# Patient Record
Sex: FEMALE | Race: WHITE | NOT HISPANIC OR LATINO | Employment: FULL TIME
[De-identification: names, ages, dates, MRNs, and addresses within clinical notes are randomized per-mention and may not be internally consistent; named-entity substitution may affect disease eponyms.]

---

## 2019-11-08 ENCOUNTER — HEALTH MAINTENANCE LETTER (OUTPATIENT)
Age: 34
End: 2019-11-08

## 2020-02-23 ENCOUNTER — HEALTH MAINTENANCE LETTER (OUTPATIENT)
Age: 35
End: 2020-02-23

## 2020-12-06 ENCOUNTER — HEALTH MAINTENANCE LETTER (OUTPATIENT)
Age: 35
End: 2020-12-06

## 2021-05-30 ENCOUNTER — RECORDS - HEALTHEAST (OUTPATIENT)
Dept: ADMINISTRATIVE | Facility: CLINIC | Age: 36
End: 2021-05-30

## 2021-09-25 ENCOUNTER — HEALTH MAINTENANCE LETTER (OUTPATIENT)
Age: 36
End: 2021-09-25

## 2021-10-06 ENCOUNTER — LAB REQUISITION (OUTPATIENT)
Dept: LAB | Facility: CLINIC | Age: 36
End: 2021-10-06
Payer: COMMERCIAL

## 2021-10-06 PROCEDURE — 88304 TISSUE EXAM BY PATHOLOGIST: CPT | Mod: TC,ORL | Performed by: ORTHOPAEDIC SURGERY

## 2021-10-06 PROCEDURE — 88304 TISSUE EXAM BY PATHOLOGIST: CPT | Performed by: PATHOLOGY

## 2021-10-07 LAB
PATH REPORT.COMMENTS IMP SPEC: NORMAL
PATH REPORT.COMMENTS IMP SPEC: NORMAL
PATH REPORT.FINAL DX SPEC: NORMAL
PATH REPORT.GROSS SPEC: NORMAL
PATH REPORT.MICROSCOPIC SPEC OTHER STN: NORMAL
PATH REPORT.RELEVANT HX SPEC: NORMAL
PHOTO IMAGE: NORMAL

## 2021-10-07 PROCEDURE — 88304 TISSUE EXAM BY PATHOLOGIST: CPT | Mod: 26 | Performed by: PATHOLOGY

## 2021-12-09 ENCOUNTER — HOSPITAL ENCOUNTER (OUTPATIENT)
Dept: MRI IMAGING | Facility: CLINIC | Age: 36
Discharge: HOME | End: 2021-12-09
Attending: ORTHOPAEDIC SURGERY | Admitting: ORTHOPAEDIC SURGERY
Payer: COMMERCIAL

## 2021-12-09 DIAGNOSIS — M25.561 RIGHT KNEE PAIN: ICD-10-CM

## 2021-12-09 PROCEDURE — 73721 MRI JNT OF LWR EXTRE W/O DYE: CPT | Mod: 26 | Performed by: RADIOLOGY

## 2021-12-09 PROCEDURE — 73721 MRI JNT OF LWR EXTRE W/O DYE: CPT | Mod: RT

## 2021-12-10 LAB — RADIOLOGIST FLAGS: NORMAL

## 2021-12-28 ENCOUNTER — HOSPITAL ENCOUNTER (EMERGENCY)
Facility: CLINIC | Age: 36
Discharge: HOME | End: 2021-12-28
Attending: EMERGENCY MEDICINE | Admitting: EMERGENCY MEDICINE
Payer: OTHER MISCELLANEOUS

## 2021-12-28 VITALS
DIASTOLIC BLOOD PRESSURE: 104 MMHG | HEART RATE: 79 BPM | WEIGHT: 160 LBS | OXYGEN SATURATION: 99 % | TEMPERATURE: 98 F | SYSTOLIC BLOOD PRESSURE: 147 MMHG | RESPIRATION RATE: 14 BRPM | BODY MASS INDEX: 27.25 KG/M2

## 2021-12-28 DIAGNOSIS — S06.9X0A TRAUMATIC BRAIN INJURY, WITHOUT LOSS OF CONSCIOUSNESS, INITIAL ENCOUNTER (H): ICD-10-CM
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
## 2021-12-28 PROCEDURE — 250N000013 HC RX MED GY IP 250 OP 250 PS 637: Performed by: EMERGENCY MEDICINE

## 2021-12-28 PROCEDURE — 99282 EMERGENCY DEPT VISIT SF MDM: CPT | Performed by: EMERGENCY MEDICINE

## 2021-12-28 PROCEDURE — 99283 EMERGENCY DEPT VISIT LOW MDM: CPT

## 2021-12-28 PROCEDURE — 250N000011 HC RX IP 250 OP 636: Performed by: EMERGENCY MEDICINE

## 2021-12-28 RX ORDER — ACETAMINOPHEN 500 MG
1000 TABLET ORAL ONCE
Status: COMPLETED | OUTPATIENT
Start: 2021-12-28 | End: 2021-12-28

## 2021-12-28 RX ORDER — ONDANSETRON 4 MG/1
4 TABLET, ORALLY DISINTEGRATING ORAL ONCE
Status: COMPLETED | OUTPATIENT
Start: 2021-12-28 | End: 2021-12-28

## 2021-12-28 RX ADMIN — ACETAMINOPHEN 1000 MG: 500 TABLET, FILM COATED ORAL at 18:41

## 2021-12-28 RX ADMIN — ONDANSETRON 4 MG: 4 TABLET, ORALLY DISINTEGRATING ORAL at 18:41

## 2021-12-28 NOTE — LETTER
December 28, 2021      To Whom It May Concern:      Clau Wilder was seen in our Emergency Department today, 12/28/21.  Please excuse Clau from missing work due to her evaluation in the emergency department and more importantly if her symptoms persist. With history reported further follow-up care may be required if her symptoms persist. If her symptoms worsen she may need to return to the emergency department to be reevaluated. This work note is valid until January 4, 2022.      Sincerely,          Jeffrey Valdovinos MD

## 2021-12-29 ENCOUNTER — TELEPHONE (OUTPATIENT)
Dept: ORTHOPEDICS | Facility: CLINIC | Age: 36
End: 2021-12-29
Payer: COMMERCIAL

## 2021-12-29 NOTE — TELEPHONE ENCOUNTER
RN looked at patient's chart. It appears Ferny has reached out to patient and left her VM so she can call to schedule to see Dr. Sanders on Jan 10th. RN called and also left her VM with the same message.  If patient calls back, please assist patient with an appt for right knee PNVS referred by Dr. Lovelace.    Curtis Mosquera RN          [facility] Call Center    Phone Message    May a detailed message be left on voicemail: yes     Reason for Call: Other: Dr Lovelace is referring Ms. Clau Wilder to Dr Sanders for Right diffused PVNS, MRI confirmed/ patient will be calling to set appt. Patient has had months of pain.      Action Taken: Message routed to:  [facility] ([facility] Ortho    Travel Screening: Not Applicable

## 2021-12-29 NOTE — TELEPHONE ENCOUNTER
Called patient and LVM offering appointment to see Dr. Sanders on January 10th. Patient instructed to call back to be scheduled and has our scheduling number.    Ferny Luna, EMT on 12/29/2021 at 3:46 PM

## 2021-12-29 NOTE — ED PROVIDER NOTES
History     Chief Complaint   Patient presents with     Head Injury     slipped on ice to back and hit back of head, no LOC     HPI  Clau Wilder is a 36 year old female who presents for evaluation after head injury.  Patient on intake reported that she slipped on ice fell backwards and struck her head. Record show a history of excessive bleeding and endometrial  polyp.  Records show she is prescribed Wellbutrin and Lamictal.  On examination patient patient arrived by car reporting that she is [place] she works with [employer]. Earlier this afternoon at 2:45 PM she was returning after visiting a client when she slipped walking into work and fell hitting her back and striking her head. She was wearing eye hats. She reports prior history of head trauma and prior diagnosis of traumatic brain injury in 2 prior occasions. She reports last year she fell while snowboarding without a helmet and also 20 years prior she fell while snowboarding without a helmet. She reports this is a work-related injury. After discussion with human resources at work and her supervisor she was advised to present to be evaluated. Since her fall she has some nausea and headache but reports no neck pain reports neck soreness. Patient reports has had no vomiting no epistaxis. She takes Prozac and Lamictal for mood. The fall was not witnessed.    Allergies:  Allergies   Allergen Reactions     Erythromycin Nausea and Vomiting       Problem List:    Patient Active Problem List    Diagnosis Date Noted     Endometrium, polyp 2016     Priority: Medium     Excessive bleeding in premenopausal period 2016     Priority: Medium        Past Medical History:    Past Medical History:   Diagnosis Date     Calculi, ureter      IUD migration (H)        Past Surgical History:    Past Surgical History:   Procedure Laterality Date      SECTION       [id number] with stone removal        HYSTEROSCOPY DIAGNOSTIC  2016     LAPAROSCOPY OPERATIVE ADULT  2016    removal of ectopic IUD       Family History:    Family History   Problem Relation Age of Onset     Thyroid Cancer Mother      Lupus Maternal Grandmother      Aneurysm Paternal Grandfather        Social History:  Marital Status:  Single [1]  Social History     Tobacco Use     Smoking status: Never Smoker     Smokeless tobacco: Not on file   Substance Use Topics     Alcohol use: Yes     Comment: occ.     Drug use: No        Medications:    buPROPion (WELLBUTRIN XL) 300 MG 24 hr tablet  lamoTRIgine (LAMICTAL) 200 MG tablet          Review of Systems    Physical Exam   BP: (!) 145/92  Pulse: 75  Temp: 98  F (36.7  C)  Resp: 16  Weight: 72.6 kg (160 lb)  SpO2: 99 %      Physical Exam  Constitutional:       General: She is not in acute distress.     Appearance: Normal appearance. She is not ill-appearing, toxic-appearing or diaphoretic.   HENT:      Head: Normocephalic and atraumatic.      Right Ear: Tympanic membrane normal.      Left Ear: Tympanic membrane normal.      Nose: Nose normal.   Eyes:      General: No visual field deficit or scleral icterus.        Right eye: No discharge.         Left eye: No discharge.      Extraocular Movements: Extraocular movements intact.      Pupils: Pupils are equal, round, and reactive to light.   Cardiovascular:      Rate and Rhythm: Normal rate and regular rhythm.      Pulses: Normal pulses.      Heart sounds: Normal heart sounds.   Pulmonary:      Effort: Pulmonary effort is normal. No respiratory distress.      Breath sounds: Normal breath sounds. No stridor. No wheezing, rhonchi or rales.   Chest:      Chest wall: No tenderness.   Musculoskeletal:         General: No swelling, tenderness, deformity or signs of injury.      Cervical back: Normal range of motion and neck supple. No tenderness.      Right lower leg: No edema.      Left lower leg: No edema.   Skin:     Capillary Refill: Capillary refill takes  less than 2 seconds.      Coloration: Skin is not jaundiced or pale.      Findings: No bruising, erythema, lesion or rash.   Neurological:      General: No focal deficit present.      Mental Status: She is alert and oriented to person, place, and time.      GCS: GCS eye subscore is 4. GCS verbal subscore is 5. GCS motor subscore is 6.      Cranial Nerves: No cranial nerve deficit or dysarthria.      Sensory: Sensation is intact. No sensory deficit.      Motor: No weakness.      Coordination: Romberg sign negative. Coordination normal. Finger-Nose-Finger Test normal. Rapid alternating movements normal.      Gait: Gait and tandem walk normal.      Deep Tendon Reflexes: Reflexes normal.   Psychiatric:         Mood and Affect: Mood normal.         Behavior: Behavior normal.         Thought Content: Thought content normal.         Judgment: Judgment normal.         ED Course                 Procedures              Critical Care time:  none               ED medications:  Medications   acetaminophen (TYLENOL) tablet 1,000 mg (1,000 mg Oral Given 12/28/21 1841)   ondansetron (ZOFRAN-ODT) ODT tab 4 mg (4 mg Oral Given 12/28/21 1841)       ED Vitals:  Vitals:    12/28/21 1650 12/28/21 1939   BP: (!) 145/92 (!) 147/104   Pulse: 75 79   Resp: 16 14   Temp: 98  F (36.7  C)    TempSrc: Temporal    SpO2: 99% 99%   Weight: 72.6 kg (160 lb)        ED labs and imaging: none        Assessments & Plan (with Medical Decision Making)   Assessment Summary and Clinical Impression: 36-year-old female presented for evaluation after head trauma.  Patient reported on arrival that she slipped on the ice and fell backwards hitting her head.she reported that she initially fell onto her shoulders and then subsequently struck her head Patient reported a mild global headache headache and nausea.  She was evaluated 4 hours after the fall. The fall was not witnessed while walking into her workplace. Patient reported 2 previous  diagnosis of mild  traumatic brain injury while snowboarding un-helmeted. Last incident was a year earlier.  She presented for evaluation history as reported is likely due to mild traumatic brain injury.  Based on history as reported and exam serially we discussed the indication for neuroimaging and after reviewing risk and benefit patient agreed with foregoing head imaging.  She was given a work note and reported this was work-related and Workmen's Comp. paperwork completed.  A referral was placed to concussion clinic for further follow-up if symptoms persist using the concussion  referral service.  Reviewed worrisome symptoms and reasons to return to the department to evaluated.  There was no scalp hematoma GCS 15.  Normal range of motion of the neck and no other injuries reported.    ED course and Plan:  Reviewed the medical record. We had a discussion about indications for head imaging and reviewed concerning symptoms including symptoms that would be suggestive of clinically significant intracranial pathology such as hemorrhage or skull fracture or cerebral edema. After reviewing risk and benefit of head imaging patient agreed to forego head imaging with plan to manage her symptoms supportively given prior diagnosis of mild traumatic brain injury. She did confirm that there was no loss of consciousness she has had no vomiting and she did not take any medications prior to arrival in the emergency department. She was given Tylenol and Zofran per nursing protocol. She was given a work note and reported this is a work-related injury and Workmen's Comp. paperwork was completed. She was offered therapies for home and declined Zofran. We reviewed worrisome symptoms including vomiting, mental status changes progressive headache that does not resolve with over-the-counter therapies like Tylenol and Motrin ibuprofen.        Disclaimer: This note consists of symbols derived from keyboarding, dictation and/or voice recognition  software. As a result, there may be errors in the script that have gone undetected. Please consider this when interpreting information found in this chart.  I have reviewed the nursing notes.    I have reviewed the findings, diagnosis, plan and need for follow up with the patient.       Discharge Medication List as of 12/28/2021  7:30 PM          Final diagnoses:   Fall, initial encounter - Slip on ice at 2:45 PM walking into work by report   Traumatic brain injury, without loss of consciousness, initial encounter (H) - After fall from standing height. 2 prior episodes of traumatic brain injury reported       12/28/2021   [facility] EMERGENCY DEPT     Farhan Valdovinos MD  12/28/21 0814

## 2021-12-29 NOTE — DISCHARGE INSTRUCTIONS
1) Your evaluation suggest that you likely have mild traumatic brain injury. You have reported prior history of mild traumatic brain injury on 2 previous occasions while snowboarding un-helmeted. After much discussion you have elected to forego head imaging with plan to manage symptoms with therapies reviewed given previous familiarity with head trauma.    2) You have reported this is a work-related injury and a work excuse has been provided and paperwork for Workmen's Comp.    3) although you appear stable for discharge to home we have discussed concerning symptoms including but not limited to persistent headache, vomiting, or any new concerns you should return to be reevaluated.    4) To help facilitate follow up care if needed a referral was placed to the concussion  service for further follow-up care if needed especially if your symptoms persist. He should be called for follow-up visit within the next 2 weeks. If you do not require follow-up he can also call to cancel the support.

## 2022-01-10 ENCOUNTER — OFFICE VISIT (OUTPATIENT)
Dept: FAMILY MEDICINE | Facility: CLINIC | Age: 37
End: 2022-01-10
Payer: OTHER MISCELLANEOUS

## 2022-01-10 ENCOUNTER — ANCILLARY PROCEDURE (OUTPATIENT)
Dept: GENERAL RADIOLOGY | Facility: CLINIC | Age: 37
End: 2022-01-10
Attending: ORTHOPAEDIC SURGERY
Payer: COMMERCIAL

## 2022-01-10 ENCOUNTER — OFFICE VISIT (OUTPATIENT)
Dept: ORTHOPEDICS | Facility: CLINIC | Age: 37
End: 2022-01-10
Payer: COMMERCIAL

## 2022-01-10 VITALS — HEIGHT: 65 IN | WEIGHT: 168 LBS | BODY MASS INDEX: 27.99 KG/M2

## 2022-01-10 VITALS
WEIGHT: 161 LBS | OXYGEN SATURATION: 98 % | BODY MASS INDEX: 27.49 KG/M2 | SYSTOLIC BLOOD PRESSURE: 118 MMHG | TEMPERATURE: 97.3 F | RESPIRATION RATE: 16 BRPM | HEIGHT: 64 IN | HEART RATE: 88 BPM | DIASTOLIC BLOOD PRESSURE: 74 MMHG

## 2022-01-10 DIAGNOSIS — M25.561 RIGHT KNEE PAIN: ICD-10-CM

## 2022-01-10 DIAGNOSIS — S06.0X0D CONCUSSION WITHOUT LOSS OF CONSCIOUSNESS, SUBSEQUENT ENCOUNTER: Primary | ICD-10-CM

## 2022-01-10 DIAGNOSIS — M25.561 RIGHT KNEE PAIN: Primary | ICD-10-CM

## 2022-01-10 DIAGNOSIS — M25.561 CHRONIC PAIN OF RIGHT KNEE: Primary | ICD-10-CM

## 2022-01-10 DIAGNOSIS — G89.29 CHRONIC PAIN OF RIGHT KNEE: Primary | ICD-10-CM

## 2022-01-10 PROCEDURE — 99204 OFFICE O/P NEW MOD 45 MIN: CPT | Mod: GC | Performed by: ORTHOPAEDIC SURGERY

## 2022-01-10 PROCEDURE — 73560 X-RAY EXAM OF KNEE 1 OR 2: CPT | Mod: RT | Performed by: RADIOLOGY

## 2022-01-10 PROCEDURE — 99203 OFFICE O/P NEW LOW 30 MIN: CPT | Performed by: FAMILY MEDICINE

## 2022-01-10 RX ORDER — FLUOXETINE 10 MG/1
20 CAPSULE ORAL EVERY MORNING
COMMUNITY

## 2022-01-10 ASSESSMENT — ENCOUNTER SYMPTOMS
HEADACHES: 1
CONSTITUTIONAL NEGATIVE: 1
NERVOUS/ANXIOUS: 1
CARDIOVASCULAR NEGATIVE: 1
MUSCULOSKELETAL NEGATIVE: 1
LIGHT-HEADEDNESS: 0
EYES NEGATIVE: 1
ENDOCRINE NEGATIVE: 1
DIZZINESS: 0
RESPIRATORY NEGATIVE: 1

## 2022-01-10 ASSESSMENT — PAIN SCALES - GENERAL: PAINLEVEL: MILD PAIN (2)

## 2022-01-10 ASSESSMENT — MIFFLIN-ST. JEOR
SCORE: 1452.3
SCORE: 1409.26

## 2022-01-10 ASSESSMENT — KOOS JR: HOW SEVERE IS YOUR KNEE STIFFNESS AFTER FIRST WAKING IN MORNING: MILD

## 2022-01-10 ASSESSMENT — ACTIVITIES OF DAILY LIVING (ADL): FUNCTION,_DAILY_LIVING_SCORE: 70.59

## 2022-01-10 NOTE — NURSING NOTE
"Chief Complaint   Patient presents with     Consult     Right knee, PNVS. 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 [place] [facility] [address] [facility]    Allergies   Allergen Reactions     Erythromycin Nausea and Vomiting       Current Outpatient Medications   Medication     FLUoxetine 20 MG tablet     lamoTRIgine (LAMICTAL) 200 MG tablet     No current facility-administered medications for this visit.     Questionnaires:    HOOS Hip Dysfunction & Osteoarthritis Outcome Questionnaire    No flowsheet data found.       KOOS Knee Survey Assessment    Knee Outcome Survey ADL Scale (ROSMERY Alexis; RUSS Schafer; Brianna, RS; Dru, FH; Jewell, CD; 1998) 1/10/2022   Do you have swelling in your knee? Always   Do you feel grinding, hear clicking or any other type of noise when your knee moves? Sometimes   Does your knee catch or hang up when moving? Never   Can you straighten your knee fully? Often   Can you bend your knee fully? Rarely   How severe is your knee joint stiffness after first wakening in the morning? Mild   How severe is your knee stiffness after sitting, lying or resting LATER IN THE DAY? Moderate   How often do you experience knee pain? Always   Twisting/pivoting on your knee Moderate   Straightening knee fully Mild   Bending knee fully Moderate   Walking on flat surface Mild   Going up or down stairs Moderate   At night while in bed Mild   Sitting or lying Moderate   Standing upright Mild   Descending stairs Mild   Ascending stairs Mild   Rising from sitting Moderate   Standing Mild   Bending to floor/ an object Mild   Walking on flat surface Mild   Getting in/out of car Mild   Going shopping Mild   Putting on socks/stockings Mild "   Rising from bed Mild   Taking off socks/stockings Mild   Lying in bed (turning over, maintaining knee position) Mild   Getting in/out of bath Mild   Sitting Moderate   Getting on/off toilet Moderate   Heavy domestic duties (moving heavy boxes, scrubbing floors, etc) Mild   Light domestic duties (cooking, dusting, etc) Mild   Squatting Severe   Running Mild   Jumping Mild   Twisting/pivoting on your injured knee Mild   Kneeling Severe   How often are you aware of your knee problem? Constantly   Have you modified your life style to avoid potentially damaging activities to your knee? Moderately   How much are you troubled with lack of confidence in your knee? Moderately   In general, how much difficulty do you have with your knee? Moderate   Pain Score 55.56   Symptoms Score 53.57   Function, Daily Living Score 70.59   Sports and Rec Score 55   Quality of Life Score 37.5       Promis 10 Assessment    PROMIS 10 1/10/2022   In general, would you say your health is: Very good   In general, would you say your quality of life is: Very good   In general, how would you rate your physical health? Very good   In general, how would you rate your mental health, including your mood and your ability to think? Very good   In general, how would you rate your satisfaction with your social activities and relationships? Very good   In general, please rate how well you carry out your usual social activities and roles Very good   To what extent are you able to carry out your everyday physical activities such as walking, climbing stairs, carrying groceries, or moving a chair? Mostly   How often have you been bothered by emotional problems such as feeling anxious, depressed or irritable? Sometimes   How would you rate your fatigue on average? Mild   How would you rate your pain on average?   0 = No Pain  to  10 = Worst Imaginable Pain 5   In general, would you say your health is: 4   In general, would you say your quality of life is: 4    In general, how would you rate your physical health? 4   In general, how would you rate your mental health, including your mood and your ability to think? 4   In general, how would you rate your satisfaction with your social activities and relationships? 4   In general, please rate how well you carry out your usual social activities and roles. (This includes activities at home, at work and in your community, and responsibilities as a parent, child, spouse, employee, friend, etc.) 4   To what extent are you able to carry out your everyday physical activities such as walking, climbing stairs, carrying groceries, or moving a chair? 4   In the past 7 days, how often have you been bothered by emotional problems such as feeling anxious, depressed, or irritable? 3   In the past 7 days, how would you rate your fatigue on average? 2   In the past 7 days, how would you rate your pain on average, where 0 means no pain, and 10 means worst imaginable pain? 5   Global Mental Health Score 15   Global Physical Health Score 15   PROMIS TOTAL - SUBSCORES 30   Some recent data might be hidden        [facility] Knee Questionnaire    No flowsheet data found.

## 2022-01-10 NOTE — PATIENT INSTRUCTIONS
"  Patient Education   Concussion Discharge Instructions  You were seen today for signs of a concussion. The symptoms will vary, depending on the nature of your injury and your health. You may have: headache, confusion, nausea (feel sick to your stomach), vomiting (throwing up) and problems with memory, concentrating or sleep. You may feel dizzy, irritable, and tired.   Children and teens may need help from their parents, teachers and coaches to watch for symptoms as they recover.  Follow-up  It is important for you to see a doctor for follow-up care to see how you are recovering. Please see your primary doctor within the next 5 to 7 days. You may have also been referred to the Concussion  service. They will contact you and arrange a follow-up visit if needed. If you need help sooner, you may call them at [phone].  Warning signs  Call your doctor or come back to Emergency if you suddenly have any of these symptoms:    Headaches that get worse    Feeling more and more drowsy    You keep repeating yourself    Strange behavior    Seizures    Repeat vomiting (throwing up)    Trouble walking    Growing confusion    Feeling more irritable    Neck pain that gets worse    Slurred speech    Weakness or numbness    Loss of consciousness    Fluid or blood coming from ears or nose  Self-care    Get lots of rest and get enough sleep at night. Take daytime naps or rest if you feel tired.    Limit physical activity and \"thinking\" activities. These can make symptoms worse.  ? Physical activity includes gym, sports, weight training, running, exercise and heavy lifting.  ? Thinking activities include homework, class work, job-related work and screen time (phone, computer, tablet, TV and video games).    Stick to a healthy diet and drink lots of fluids.    As symptoms improve, you may slowly return to your daily activities. If symptoms get worse   or return, reduce your activity.    Know that it is normal to feel sad and " frustrated when you do not feel right and are less active.  Going back to work    Your care team will tell you when you are ready to return to work.    Limit the amount of work you do soon after your injury. This may speed healing. Take breaks if your symptoms get worse. You should also reduce your physical activity as well as activities that require a lot of thinking until you see your doctor.    You may need shorter work days and a lighter workload.    Avoid heavy lifting, working with machinery, driving and working at heights until your symptoms are gone or you are cleared by a doctor.  Returning to sports    Never return to play if you have any symptoms. A full recovery will reduce the chances of getting hurt again. Remember, it is better to miss one or two games than a whole season.    You should rest from all physical activity until you see your doctor. Generally, if all symptoms have completely cleared, your doctor can help guide you to slowly return to sports. If symptoms return or worsen, stop the activity and see your doctor.    Important: If you are in an organized sport and under age 18, you will need written consent from a healthcare provider before you return to sports. Typically, this will be your primary care or sports medicine doctor. Please make an appointment.  Going back to school    If you are still having symptoms, you may need extra help at school.    Tell your teachers and school nurse about your injury and symptoms. Ask them to watch for problems with learning, memory and concentrating. Symptoms may get worse when you do schoolwork, and you may become more irritable.    You may need shorter school days, a reduced workload, and to postpone testing.    Do not drive or take gym class (physical activity) until cleared by a doctor.    For informational purposes only. Not to replace the advice of your health care provider.   2009 [employer]. Used with permission.  "This form is adapted from the \"Heads Up: Brain Injury in Your Practice\" tool kit developed by the Centers for Disease Control and Prevention (CDC). All rights reserved. [facility]. [employer] 099849px - Rev 03/17.       "

## 2022-01-10 NOTE — PROGRESS NOTES
[facility], Orthopaedic Oncology Surgery Consultation  by Kelton Sanders M.D.    Clau Wilder MRN# [id number]    YOB: 1985     Requesting physician: David Lovelace            Assessment and Plan:   Assessment:  1.  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    1. Recommend proceeding with arthroscopic synovial biopsy of the right knee for diagnostic confirmation.    Should this be pigmented villonodular synovitis Clau would likely require anterior and posterior synovectomy's of the right knee.  We discussed the natural history of PVNS, that even following surgery there is likelihood of recurrence.  Discussed that there are some pharmacologic therapies that can be useful in those instances, however there are in some instances undesirable side effects associated with these medications and they are generally second line to surgical intervention.  Should she have persistent symptoms, she may have early degenerative changes of the joint and bony erosion.      Lemuel Negrete DO  Adult Recon surgery fellow      Attending MD (Dr. Kelton Sanders) Attestation :  This patient was seen and evaluated by me including a history, exam, and interpretation of all imaging and/or lab data.  Either a training physician (resident/fellow), who also saw the patient, or scribe has documented the visit in the attached note.    Proposed surgery:  Arthroscopic synovial biopsy of the right knee, 30 minutes OR time, same-day surgery discharge.    MD Richard Taylor Family Professor  Oncology and Adult Reconstructive Surgery  Dept Orthopaedic Surgery, [facility]  [phone] office, [phone] pager  [email or web address]                 History of Present Illness:   36 year old  female with atraumatic right knee pain and effusions since May.  She denies any inciting event or trauma.  She has pain with activity, particularly deep squats and stairs.  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 She denies any weight loss, fatigue, fevers, chills or other constitutional symptoms.      Current symptoms:  Problem: Right knee pain  Onset and duration: 8 months, May 2021  Awakens from sleep due to sx's:  No  Precipitating Injury:  No    Other joints or sites painful:  No  Fever: No  Appetite change or weight loss: No  History of prior or existing cancer: No    Background history:  DX:  1. Right knee atraumatic effusion and pain, May 2021    TREATMENTS:  12/9/21 MRI of right knee demonstrating Synovitis with multiple masslike areas of  synovial proliferation with associated susceptibility artifact.  Findings highly suspicious for PVNS.           Physical Exam:     EXAMINATION pertinent findings:   PSYCH: Pleasant, healthy-appearing, alert, oriented x3, cooperative. Normal mood and affect.  VITAL SIGNS: Last menstrual period 08/23/2016, not currently breastfeeding..  Reviewed nursing intake notes.   There is no height or weight on file to calculate BMI.  RESP: non labored breathing   ABD: benign, soft, non-tender, no acute peritoneal findings  SKIN: grossly normal   LYMPHATIC: grossly normal, no adenopathy, no extremity edema  NEURO: grossly normal , no motor deficits  VASCULAR: satisfactory perfusion of all extremities   MUSCULOSKELETAL:   Right lower extremity: Right knee effusion with ballotable patella.  No erythema or ecchymoses noted along the lower extremity.  Active range of motion from 0 degrees terminal extension to 130 degrees flexion.  Some pain with deep flexion and throughout arc range of motion.  Knee is stable to varus valgus stress in flexion mid flexion and extension.  Negative anterior posterior drawers.  Sensory intact to light touch in  sural/saphenous/superficial peroneal/deep peroneal/tibial nerve distributions.  Motor intact in EHL/FHL/dorsiflexion/plantarflexion.           Data:   All laboratory data reviewed  All imaging studies reviewed by me     Multiple views of the right knee demonstrate a moderate joint effusion.  There is no evidence of degenerative changes or acute process.  MRI of the right knee reviewed demonstrating a joint effusion with multiple areas of masslike projections and synovial proliferation suspicious for pigmented villonodular synovitis.     DATA for DOCUMENTATION:         Past Medical History:     Patient Active Problem List   Diagnosis     Endometrium, polyp     Excessive bleeding in premenopausal period     Past Medical History:   Diagnosis Date     Calculi, ureter      IUD migration     perforation       Also see scanned health assessment forms.       Past Surgical History:     Past Surgical History:   Procedure Laterality Date      SECTION       [id number] with stone removal       HYSTEROSCOPY DIAGNOSTIC  2016     LAPAROSCOPY OPERATIVE ADULT  2016    removal of ectopic IUD            Social History:     Social History     Socioeconomic History     Marital status:      Spouse name: Not on file     Number of children: 1     Years of education: Not on file     Highest education level: Not on file   Occupational History     Not on file   Tobacco Use     Smoking status: Never Smoker     Smokeless tobacco: Never Used   Vaping Use     Vaping Use: Never used   Substance and Sexual Activity     Alcohol use: Yes     Comment: occ.     Drug use: No     Sexual activity: Yes     Partners: Male     Birth control/protection: Condom   Other Topics Concern     Parent/sibling w/ CABG, MI or angioplasty before 65F 55M? Not Asked   Social History Narrative     Not on file     Social Determinants of Health     Financial Resource Strain: Not on file   Food Insecurity: Not on file   Transportation  Needs: Not on file   Physical Activity: Not on file   Stress: Not on file   Social Connections: Not on file   Intimate Partner Violence: Not on file   Housing Stability: Not on file            Family History:       Family History   Problem Relation Age of Onset     Thyroid Cancer Mother      Lupus Maternal Grandmother      Aneurysm Paternal Grandfather             Medications:     Current Outpatient Medications   Medication Sig     FLUoxetine 20 MG tablet Take 20 mg by mouth daily     lamoTRIgine (LAMICTAL) 200 MG tablet Take 200 mg by mouth 2 times daily     No current facility-administered medications for this visit.              Review of Systems:   A comprehensive 10 point review of systems (constitutional, ENT, cardiac, peripheral vascular, lymphatic, respiratory, GI, , Musculoskeletal, skin, Neurological) was performed and found to be negative except as described in this note.     See intake form completed by patient

## 2022-01-10 NOTE — NURSING NOTE
Clau said her boyfriend will be her caregiver following surgery.  Clau is vaccinated for COVID.  Clau will do her preop at [facility].  Confirmed surgical date of .  Set up COVID test for Clau.  Clau has had multiple surgical procedures, tendon, cyst bone, hysterectomy, IUD, , kidney stones.   Clau said she has tolerated anesthesia with all her procedures.  Denies cardiac history.  Denies diabetes.  Denies pulm history.  Denies bleeding/clotting disorders.  Clau is aware that his will be a same day procedure.      Teaching Flowsheet   Relevant Diagnosis: Arthroscopic synovial biopsy of the right knee  Teaching Topic: Preop Teaching for above     Person(s) involved in teaching:   Patient     Motivation Level:  Asks Questions: Yes  Eager to Learn: Yes  Cooperative: Yes  Receptive (willing/able to accept information): Yes  Any cultural factors/Scientologist beliefs that may influence understanding or compliance? No       Patient demonstrates understanding of the following:  Reason for the appointment, diagnosis and treatment plan: Yes  Knowledge of proper use of medications and conditions for which they are ordered (with special attention to potential side effects or drug interactions): Yes  Which situations necessitate calling provider and whom to contact: Yes          Nutritional needs and diet plan: Yes  Pain management techniques: Yes  Wound Care: Yes  How and/when to access community resources: NA     Instructional Materials Used/Given: Preop Packet and Antiseptic Soap    Time spent with patient: 30 minutes.    MARIO DoN, RN  RN Care Coordinator, Dr. Tommy BENSON [facility]

## 2022-01-10 NOTE — LETTER
1/10/2022         RE: Clau Wilder  [address]  [place] [address]        Dear Colleague,    Thank you for referring your patient, Clau Wilder, to the [facility]. Please see a copy of my visit note below.        [facility], Orthopaedic Oncology Surgery Consultation  by Kelton Sanders M.D.    Clau Wilder MRN# [id number]    YOB: 1985     Requesting physician: David Lovelace            Assessment and Plan:   Assessment:  1.  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    1. Recommend proceeding with arthroscopic synovial biopsy of the right knee for diagnostic confirmation.    Should this be pigmented villonodular synovitis Clau would likely require anterior and posterior synovectomy's of the right knee.  We discussed the natural history of PVNS, that even following surgery there is likelihood of recurrence.  Discussed that there are some pharmacologic therapies that can be useful in those instances, however there are in some instances undesirable side effects associated with these medications and they are generally second line to surgical intervention.  Should she have persistent symptoms, she may have early degenerative changes of the joint and bony erosion.      Lemuel Negrete,   Adult Recon surgery fellow      Attending MD (Dr. Kelton Sanders) Attestation :  This patient was seen and evaluated by me including a history, exam, and interpretation of all imaging and/or lab data.  Either a training physician (resident/fellow), who also saw the patient, or scribe has documented the visit in the attached note.    Proposed surgery:  Arthroscopic synovial biopsy of the right knee, 30 minutes OR time, same-day surgery  discharge.    MD Richard Taylor Family Professor  Oncology and Adult Reconstructive Surgery  Dept Orthopaedic Surgery, [facility]  [phone] office, [phone] pager  [email or web address]                 History of Present Illness:   36 year old female with atraumatic right knee pain and effusions since May.  She denies any inciting event or trauma.  She has pain with activity, particularly deep squats and stairs.  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 She denies any weight loss, fatigue, fevers, chills or other constitutional symptoms.      Current symptoms:  Problem: Right knee pain  Onset and duration: 8 months, May 2021  Awakens from sleep due to sx's:  No  Precipitating Injury:  No    Other joints or sites painful:  No  Fever: No  Appetite change or weight loss: No  History of prior or existing cancer: No    Background history:  DX:  1. Right knee atraumatic effusion and pain, May 2021    TREATMENTS:  12/9/21 MRI of right knee demonstrating Synovitis with multiple masslike areas of  synovial proliferation with associated susceptibility artifact.  Findings highly suspicious for PVNS.           Physical Exam:     EXAMINATION pertinent findings:   PSYCH: Pleasant, healthy-appearing, alert, oriented x3, cooperative. Normal mood and affect.  VITAL SIGNS: Last menstrual period 08/23/2016, not currently breastfeeding..  Reviewed nursing intake notes.   There is no height or weight on file to calculate BMI.  RESP: non labored breathing   ABD: benign, soft, non-tender, no acute peritoneal findings  SKIN: grossly normal   LYMPHATIC: grossly normal, no adenopathy, no extremity edema  NEURO: grossly normal , no motor deficits  VASCULAR: satisfactory perfusion of all extremities   MUSCULOSKELETAL:   Right lower extremity: Right knee effusion with ballotable patella.  No erythema or ecchymoses noted along the lower extremity.  Active range of motion from 0  degrees terminal extension to 130 degrees flexion.  Some pain with deep flexion and throughout arc range of motion.  Knee is stable to varus valgus stress in flexion mid flexion and extension.  Negative anterior posterior drawers.  Sensory intact to light touch in sural/saphenous/superficial peroneal/deep peroneal/tibial nerve distributions.  Motor intact in EHL/FHL/dorsiflexion/plantarflexion.           Data:   All laboratory data reviewed  All imaging studies reviewed by me     Multiple views of the right knee demonstrate a moderate joint effusion.  There is no evidence of degenerative changes or acute process.  MRI of the right knee reviewed demonstrating a joint effusion with multiple areas of masslike projections and synovial proliferation suspicious for pigmented villonodular synovitis.     DATA for DOCUMENTATION:         Past Medical History:     Patient Active Problem List   Diagnosis     Endometrium, polyp     Excessive bleeding in premenopausal period     Past Medical History:   Diagnosis Date     Calculi, ureter      IUD migration     perforation       Also see scanned health assessment forms.       Past Surgical History:     Past Surgical History:   Procedure Laterality Date      SECTION       [id number] with stone removal       HYSTEROSCOPY DIAGNOSTIC  2016     LAPAROSCOPY OPERATIVE ADULT  2016    removal of ectopic IUD            Social History:     Social History     Socioeconomic History     Marital status:      Spouse name: Not on file     Number of children: 1     Years of education: Not on file     Highest education level: Not on file   Occupational History     Not on file   Tobacco Use     Smoking status: Never Smoker     Smokeless tobacco: Never Used   Vaping Use     Vaping Use: Never used   Substance and Sexual Activity     Alcohol use: Yes     Comment: occ.     Drug use: No     Sexual activity: Yes     Partners: Male     Birth control/protection: Condom    Other Topics Concern     Parent/sibling w/ CABG, MI or angioplasty before 65F 55M? Not Asked   Social History Narrative     Not on file     Social Determinants of Health     Financial Resource Strain: Not on file   Food Insecurity: Not on file   Transportation Needs: Not on file   Physical Activity: Not on file   Stress: Not on file   Social Connections: Not on file   Intimate Partner Violence: Not on file   Housing Stability: Not on file            Family History:       Family History   Problem Relation Age of Onset     Thyroid Cancer Mother      Lupus Maternal Grandmother      Aneurysm Paternal Grandfather             Medications:     Current Outpatient Medications   Medication Sig     FLUoxetine 20 MG tablet Take 20 mg by mouth daily     lamoTRIgine (LAMICTAL) 200 MG tablet Take 200 mg by mouth 2 times daily     No current facility-administered medications for this visit.              Review of Systems:   A comprehensive 10 point review of systems (constitutional, ENT, cardiac, peripheral vascular, lymphatic, respiratory, GI, , Musculoskeletal, skin, Neurological) was performed and found to be negative except as described in this note.     See intake form completed by patient

## 2022-01-10 NOTE — PROGRESS NOTES
Assessment & Plan     Concussion without loss of consciousness, subsequent encounter  Given that this is her third head injury, recommend concussion clinic.      FUTURE APPOINTMENTS:       - Follow-up visit in one month    Return in about 4 weeks (around 2/7/2022) for Follow up.    Migdalia Zeng MD  [facility]    Chelsea Olguin is a 36 year old who presents for the following health issues     HPI Patient is a 36-year-old female presenting for emergency room follow-up.  She was seen on 12/28/2021 for a head injury following a fall on ice where she fell and hit her head, fell backwards and struck her head.  She was diagnosed with traumatic brain injury without loss of consciousness.      Since the incident she reports that she has had mild headaches , states she feels like she is more irritated.  She denies dizziness or loss of balance.  She is open to seeing a concussion specialist and referral was printed out for her today.  This will be her 3rd head injury. She hhas had 2 in the past the two past head injuries was while she was snowboarding without wearing a helmet.     ED/UC Followup:    Facility:  [facility] ED  Date of visit: 12/28/2021  Reason for visit: Head injury, TBI without loss of consciousness  Current Status: having headaches about everyday and seems to be more irritable than before           Review of Systems   Constitutional: Negative.    HENT: Negative.    Eyes: Negative.    Respiratory: Negative.    Cardiovascular: Negative.  Negative for peripheral edema.   Endocrine: Negative.    Breasts:  negative.    Genitourinary: Negative.    Musculoskeletal: Negative.    Skin: Negative.    Neurological: Positive for headaches. Negative for dizziness and light-headedness.   Psychiatric/Behavioral: Positive for mood changes. The patient is nervous/anxious.             Objective    /74 (BP Location: Left arm, Patient Position: Sitting, Cuff Size:  "Adult Regular)   Pulse 88   Temp 97.3  F (36.3  C) (Tympanic)   Resp 16   Ht 1.632 m (5' 4.25\")   Wt 73 kg (161 lb)   LMP 08/23/2016   SpO2 98%   Breastfeeding No   BMI 27.42 kg/m    Body mass index is 27.42 kg/m .  Physical Exam  Constitutional:       Appearance: Normal appearance.   HENT:      Head: Normocephalic and atraumatic.      Right Ear: Tympanic membrane normal.      Left Ear: Tympanic membrane normal.      Mouth/Throat:      Mouth: Mucous membranes are moist.   Eyes:      Extraocular Movements: Extraocular movements intact.      Conjunctiva/sclera: Conjunctivae normal.      Pupils: Pupils are equal, round, and reactive to light.   Cardiovascular:      Rate and Rhythm: Normal rate and regular rhythm.      Pulses: Normal pulses.      Heart sounds: Normal heart sounds.   Pulmonary:      Effort: Pulmonary effort is normal.      Breath sounds: Normal breath sounds.   Abdominal:      General: Abdomen is flat.      Palpations: Abdomen is soft.   Musculoskeletal:      Cervical back: Normal range of motion and neck supple.   Neurological:      Mental Status: She is alert and oriented to person, place, and time. Mental status is at baseline.      Cranial Nerves: No cranial nerve deficit.      Sensory: No sensory deficit.      Motor: No weakness.      Coordination: Coordination normal.      Gait: Gait normal.      Deep Tendon Reflexes: Reflexes normal.   Psychiatric:         Mood and Affect: Mood normal.         Behavior: Behavior normal.                    "

## 2022-01-11 ENCOUNTER — PREP FOR PROCEDURE (OUTPATIENT)
Dept: ORTHOPEDICS | Facility: CLINIC | Age: 37
End: 2022-01-11
Payer: COMMERCIAL

## 2022-01-11 DIAGNOSIS — M25.561 RIGHT KNEE PAIN: Primary | ICD-10-CM

## 2022-01-11 NOTE — TELEPHONE ENCOUNTER
FUTURE VISIT INFORMATION      SURGERY INFORMATION:    Surgey with Dr. Sanders 2/11, Arthroscopic synovial biopsy of the right knee    Consult: ov 1/10/22    RECORDS REQUESTED FROM:       Primary Care Provider: Clarisa Lorenzana CNM, JESUS Casanova

## 2022-01-15 ENCOUNTER — HEALTH MAINTENANCE LETTER (OUTPATIENT)
Age: 37
End: 2022-01-15

## 2022-01-19 ENCOUNTER — VIRTUAL VISIT (OUTPATIENT)
Dept: SURGERY | Facility: CLINIC | Age: 37
End: 2022-01-19
Payer: COMMERCIAL

## 2022-01-19 ENCOUNTER — PRE VISIT (OUTPATIENT)
Dept: SURGERY | Facility: CLINIC | Age: 37
End: 2022-01-19

## 2022-01-19 ENCOUNTER — ANESTHESIA EVENT (OUTPATIENT)
Dept: SURGERY | Facility: CLINIC | Age: 37
End: 2022-01-19

## 2022-01-19 ENCOUNTER — TELEPHONE (OUTPATIENT)
Dept: SURGERY | Facility: CLINIC | Age: 37
End: 2022-01-19

## 2022-01-19 DIAGNOSIS — Z01.818 PRE-OP EVALUATION: Primary | ICD-10-CM

## 2022-01-19 PROCEDURE — 99204 OFFICE O/P NEW MOD 45 MIN: CPT | Mod: 95 | Performed by: PHYSICIAN ASSISTANT

## 2022-01-19 RX ORDER — MULTIPLE VITAMINS W/ MINERALS TAB 9MG-400MCG
1 TAB ORAL EVERY MORNING
COMMUNITY

## 2022-01-19 RX ORDER — ACETAMINOPHEN 325 MG/1
650-975 TABLET ORAL EVERY 6 HOURS PRN
Status: ON HOLD | COMMUNITY
End: 2022-03-25

## 2022-01-19 RX ORDER — IBUPROFEN 200 MG
200 TABLET ORAL EVERY 4 HOURS PRN
COMMUNITY

## 2022-01-19 ASSESSMENT — LIFESTYLE VARIABLES: TOBACCO_USE: 0

## 2022-01-19 ASSESSMENT — PAIN SCALES - GENERAL: PAINLEVEL: MILD PAIN (2)

## 2022-01-19 NOTE — ANESTHESIA PREPROCEDURE EVALUATION
Anesthesia Pre-Procedure Evaluation    Patient: Clau Wilder   MRN: [id number] : 1985        Preoperative Diagnosis: * No surgery found *    Procedure :   Video Visit       Past Medical History:   Diagnosis Date     Calculi, ureter      Depressive disorder      IUD migration     perforation     Mental disorder       Past Surgical History:   Procedure Laterality Date      SECTION       [id number] with stone removal       HYSTEROSCOPY DIAGNOSTIC  2016     LAPAROSCOPY OPERATIVE ADULT  2016    removal of ectopic IUD     [place] HAND/FINGER SURGERY UNLISTED  10.6.2021     LA STOMACH SURGERY PROCEDURE UNLISTED      , IUD retrieval      Allergies   Allergen Reactions     Erythromycin Nausea and Vomiting      Social History     Tobacco Use     Smoking status: Never Smoker     Smokeless tobacco: Never Used   Substance Use Topics     Alcohol use: Yes     Comment: occ.      Wt Readings from Last 1 Encounters:   01/10/22 76.2 kg (168 lb)        Anesthesia Evaluation   Pt has had prior anesthetic.     No history of anesthetic complications       ROS/MED HX  ENT/Pulmonary:     (+) sleep apnea, mild, doesn't use CPAP,  (-) tobacco use   Neurologic: Comment: Concussion 2 weeks ago, intermittent headaches      Cardiovascular:    (-) taking anticoagulants/antiplatelets   METS/Exercise Tolerance: >4 METS Comment: Yoga, walking   Hematologic:     (+) anemia,  (-) history of blood transfusion   Musculoskeletal: Comment: Knee pain      GI/Hepatic:    (-) GERD   Renal/Genitourinary:  - neg Renal ROS     Endo:  - neg endo ROS     Psychiatric/Substance Use:     (+) psychiatric history depression     Infectious Disease:  - neg infectious disease ROS     Malignancy:  - neg malignancy ROS     Other:               OUTSIDE LABS:  CBC: No results found for: WBC, HGB, HCT, PLT  BMP: No results found for: NA, POTASSIUM, CHLORIDE, CO2, BUN, CR, GLC  COAGS: No results found for: PTT, INR, FIBR  POC:   Lab  Results   Component Value Date    HCG Negative 01/15/2007     HEPATIC: No results found for: ALBUMIN, PROTTOTAL, ALT, AST, GGT, ALKPHOS, BILITOTAL, BILIDIRECT, KEIRY  OTHER: No results found for: PH, LACT, A1C, JORGE, PHOS, MAG, LIPASE, AMYLASE, TSH, T4, T3, CRP, SED          PAC Discussion and Assessment    ASA Classification: 2  Case is suitable for: [place]  Anesthetic techniques and relevant risks discussed: GA                  PAC Resident/NP Anesthesia Assessment: Clau Wilder is a 36-year-old female scheduled for Athroscopic synovial biopsy Right knee on 2/11/22 by Dr. Sanders in treatment of right knee pain.  PAC referral for risk assessment and optimization for anesthesia with comorbid conditions of depression:        Pre-operative considerations:    1.  Cardiac:  Functional status- METS >4. Denies cardiac symptoms.  low risk surgery with 0.4% (RCRI #) risk of major adverse cardiac event.        2.  Pulm:  BRANDON risk: low  ~non smoker        3. GI:  Risk of PONV score = 3.  If > 2, anti-emetic intervention recommended.        4. psych: depression using fluoxetine and Lamictal      5. Msk: right knee pain with the above procedure now planned       6. Neuro: concussion 2 weeks ago after slipping on ice in the parking lot. Denies LOC. She reports she continues to get intermittent headaches (endorsess current headache during our visit today) and irritability. She was referred to the concussion clinic, but reports clinic is booked out until March. We typically recommend delaying anesthesia until complete resolution of symptoms. I have placed neurology referral for optimization prior to surgery. We will help to schedule this appointment. I will alert surgeon to recommendations.     VTE risk: 0.26%      Patient is optimized and is acceptable candidate for the proposed procedure, pending neurology recommendations or resolution in post concussion symptoms       **Physical exam and vital signs not completed today as  this visit was scheduled as a virtual visit during Covid 19 pandemic. Physical exam should be completed the DOS in pre-op**    Final plan per anesthesiologist on day of surgery.      Patient discussed with Dr. Timmons      **For further details of assessment, testing, and physical exam please see H and P completed on same date.      ADDENDUM: 2/2/22  Multiple attempts made to arrange appointment for patient in concussion clinic without success. In the interim she has been in contact with PAC team and Dr. Sanders's team. She reports that her concussion symptoms have completely resolved and she feels that she could proceed to surgery. Patient's case was reviewed today with Dr. Mosqueda. He felt that with complete resolution of symptoms she could proceed to surgery at this time. Patient and Dr. Sanders's team informed.           LIBIA Mcintyre PA-C

## 2022-01-19 NOTE — PATIENT INSTRUCTIONS
Preparing for Your Surgery      Name:  Clau Wilder   MRN:  [id number]   :  1985   Today's Date:  2022       Arriving for surgery:  Surgery date:  22  Arrival time:  5:30 am    Restrictions due to COVID 19       Effective 22 [facility] is implementing the following visitor policy:     1 person may accompany the patient through the Pre-Op process.      Then that person will be asked to leave the building.        There will be no visitors in the Surgery Waiting Room.        Inpatients are allowed 1 consistent visitor for the duration of their stay.      Visitors must wear a mask.      Visitors must not be ill.      Visiting hours are 8 am to 8 pm.    [employer] parking is available for anyone with mobility limitations or disabilities.  ([place]  24 hours/ 7 days a week; [facility]  7 am- 3:30 pm, Mon- Fri)    Please come to:     [facility] [address]  [address] [place]  [address]    -Parking is available in the Green Lot.    -Proceed to the 3rd floor, check in at the Adult Surgery Waiting Lounge. [phone]    If an escort is needed stop at the Information Desk in the lobby. Inform the information person that you are here for surgery. An escort to the Adult Surgery Waiting Lounge will be provided.    What can I eat or drink?  -  You may eat and drink normally for up to 8 hours before your surgery. (Until 11:30 pm 2-10-22)  -  You may have clear liquids until 2 hours before surgery. (Until 5:30 am)    Examples of clear liquids:  Water  Clear broth  Juices (apple, white grape, white cranberry  and cider) without pulp  Noncarbonated, powder based beverages  (lemonade and Bill-Aid)  Sodas (Sprite, 7-Up, ginger ale and seltzer)  Coffee or tea (without milk or cream)  Gatorade    -  No Alcohol for at least 24 hours before surgery     Which medicines can I take?  Hold Aspirin for 7 days before surgery.   * Hold Multivitamins for 7  days before surgery. Take the last Multivitamin on 2-3-22 and then hold until surgery.  Hold Supplements for 7 days before surgery.  * Hold Ibuprofen (Advil, Motrin) for 1 day before surgery--unless otherwise directed by surgeon.  Hold Naproxen (Aleve) for 4 days before surgery.    -  PLEASE TAKE these medications the day of surgery:  Fluoxetine,  Acetaminophen (Tylenol) if needed    How do I prepare myself?  - Please take 2 showers before surgery using Scrubcare or Hibiclens soap.    Use this soap only from the neck to your toes.     Leave the soap on your skin for one minute--then rinse thoroughly.      You may use your own shampoo and conditioner; no other hair products.   - Please remove all jewelry and body piercings.  - No lotions, deodorants or fragrance.  - No makeup or fingernail polish.   - Bring your ID and insurance card.    -If you have a Deep Brain Stimulator, Spinal Cord Stimulator or any neuro stimulator device---you must bring the remote control to the hospital     - All patients are required to have a Covid-19 test within 4 days of surgery/procedure.      -Patients will be contacted by the [facility] scheduling team within 1 week of surgery to make an appointment.      - Patients may call the Scheduling team at [phone] if they have not been scheduled within 4 days of  surgery.      ALL PATIENTS GOING HOME THE SAME DAY OF SURGERY ARE REQUIRED TO HAVE A RESPONSIBLE ADULT TO DRIVE AND BE IN ATTENDANCE WITH THEM FOR 24 HOURS FOLLOWING SURGERY.      Questions or Concerns:    - For any questions regarding the day of surgery or your hospital stay, please contact the Pre Admission Nursing Office at [phone].       - If you have health changes between today and your surgery please call your surgeon.       For questions after surgery please call your surgeons office.

## 2022-01-19 NOTE — TELEPHONE ENCOUNTER
"Left message with the concussion  x2 to schedule the patient an appointment with a concussion specialist as soon as possible. Per ROBINSON Mcintyre:  \"Clau needs to be seen by Neurology for preop eval in the setting of concussion 2 weeks ago with ongoing headaches and irritability. She is scheduled for surgery on her knee 2/11/22, but we need neuro input before she undergoes anesthesia.\"  Will continue to follow up.  Rajni Narvaez RN  "

## 2022-01-19 NOTE — H&P
Pre-Operative H & P     CC:  Preoperative exam to assess for increased cardiopulmonary risk while undergoing surgery and anesthesia.    Date of Encounter: 1/19/2022  Primary Care Physician:  No Ref-Primary, Physician     Reason for visit:   Encounter Diagnosis   Name Primary?     Pre-op evaluation Yes       HPI  Clau Wilder is a 36 year old female who presents for pre-operative H & P in preparation for Athroscopic synovial biopsy Right knee with Dr. Sanders on 2/11/22 at [facility]. Patient is being evaluated for comorbid conditions of Depression      Ms. Wilder has an 8-month history of non-traumatic right knee pain. She endorses pain with squats or when ascending stairs. Imaging revealed synovitis with multiple mass like areas of synovial proliferation. She was seen by Dr. Sanders for further evaluation. She is now scheduled for the above procedure.     History is obtained from the patient and chart review    Hx of abnormal bleeding or anti-platelet use: denies    Menstrual history: Patient's last menstrual period was 08/23/2016.    Prior to Admission Medications  Current Outpatient Medications   Medication Sig Dispense Refill     acetaminophen (TYLENOL) 325 MG tablet Take 325-650 mg by mouth every 6 hours as needed for mild pain       FLUoxetine 20 MG tablet Take 20 mg by mouth every morning        ibuprofen (ADVIL/MOTRIN) 200 MG tablet Take 200 mg by mouth every 4 hours as needed for mild pain       lamoTRIgine (LAMICTAL) 200 MG tablet Take 300 mg by mouth At Bedtime   0     multivitamin w/minerals (MULTI-VITAMIN) tablet Take 1 tablet by mouth every morning         Family History  Family History   Problem Relation Age of Onset     Thyroid Cancer Mother      Other Cancer Mother         Thyroid cancer     Lupus Maternal Grandmother      Aneurysm Paternal Grandfather        The complete review of systems is negative other than noted in the HPI or here.     Anesthesia  Pre-Procedure Evaluation    Patient: Clau Wilder   MRN: [id number] : 1985        Preoperative Diagnosis: * No surgery found *    Procedure :   Video Visit       Past Medical History:   Diagnosis Date     Calculi, ureter      Depressive disorder      IUD migration     perforation     Mental disorder       Past Surgical History:   Procedure Laterality Date      SECTION       [id number] with stone removal       HYSTEROSCOPY DIAGNOSTIC  2016     LAPAROSCOPY OPERATIVE ADULT  2016    removal of ectopic IUD     [place] HAND/FINGER SURGERY UNLISTED  10.6.2021     [place] STOMACH SURGERY PROCEDURE UNLISTED      , IUD retrieval      Allergies   Allergen Reactions     Erythromycin Nausea and Vomiting      Social History     Tobacco Use     Smoking status: Never Smoker     Smokeless tobacco: Never Used   Substance Use Topics     Alcohol use: Yes     Comment: occ.      Wt Readings from Last 1 Encounters:   01/10/22 76.2 kg (168 lb)        Anesthesia Evaluation        ROS/MED HX  ENT/Pulmonary:  - neg pulmonary ROS  (-) tobacco use   Neurologic:  - neg neurologic ROS     Cardiovascular:    (-) taking anticoagulants/antiplatelets   METS/Exercise Tolerance:     Hematologic:     (+) anemia,  (-) history of blood transfusion   Musculoskeletal: Comment: Knee pain      GI/Hepatic:    (-) GERD   Renal/Genitourinary:  - neg Renal ROS     Endo:  - neg endo ROS     Psychiatric/Substance Use:     (+) psychiatric history depression     Infectious Disease:  - neg infectious disease ROS     Malignancy:  - neg malignancy ROS     Other:               OUTSIDE LABS:  CBC: No results found for: WBC, HGB, HCT, PLT  BMP: No results found for: NA, POTASSIUM, CHLORIDE, CO2, BUN, CR, GLC  COAGS: No results found for: PTT, INR, FIBR  POC:   Lab Results   Component Value Date    HCG Negative 01/15/2007     HEPATIC: No results found for: ALBUMIN, PROTTOTAL, ALT, AST, GGT, ALKPHOS, BILITOTAL, BILIDIRECT,  KEIRY  OTHER: No results found for: PH, LACT, A1C, JORGE, PHOS, MAG, LIPASE, AMYLASE, TSH, T4, T3, CRP, SED       Virtual visit -  No vitals were obtained       Physical Exam  Constitutional: Awake, alert, cooperative, no apparent distress, and appears stated age.  HENT: Normocephalic  Respiratory: non labored breathing   Neurologic: Awake, alert, oriented to name, place and time.   Neuropsychiatric: Calm, cooperative. Normal affect.         PRIOR LABS/DIAGNOSTIC STUDIES:   All labs and imaging personally reviewed       EKG/ stress test - if available please see in ROS above   No results found.  No flowsheet data found.    The patient's records and results personally reviewed by this provider.     Outside records reviewed from: care everywhere      ASSESSMENT and PLAN    Clau Wilder is a 36-year-old female scheduled for Athroscopic synovial biopsy Right knee on 2/11/22 by Dr. Sanders in treatment of right knee pain.  PAC referral for risk assessment and optimization for anesthesia with comorbid conditions of depression:        Pre-operative considerations:    1.  Cardiac:  Functional status- METS >4. Denies cardiac symptoms.  low risk surgery with 0.4% (RCRI #) risk of major adverse cardiac event.        2.  Pulm:  BRANDON risk: low  ~non smoker        3. GI:  Risk of PONV score = 3.  If > 2, anti-emetic intervention recommended.        4. psych: depression using fluoxetine and Lamictal      5. Msk: right knee pain with the above procedure now planned       6. Neuro: concussion 2 weeks ago after slipping on ice in the parking lot. Denies LOC. She reports she continues to get intermittent headaches (endorsess current headache during our visit today) and irritability. She was referred to the concussion clinic, but reports clinic is booked out until March. We typically recommend delaying anesthesia until complete resolution of symptoms. I have placed neurology referral for optimization prior to surgery. We will help to  schedule this appointment. I will alert surgeon to recommendations.     VTE risk: 0.26%      Patient is optimized and is acceptable candidate for the proposed procedure, pending neurology recommendations or resolution in post concussion symptoms     Final plan per anesthesiologist on day of surgery.      Patient discussed with Dr. Timmons    ** Patient's visit was done virtually today.  A full physical exam was not completed.  Please refer to the physical examination documented by the anesthesiologist in the anesthesia record on the day of surgery. **    Arrival time, NPO, shower and medication instructions provided by nursing staff today.    ADDENDUM: 2/2/22  Multiple attempts made to arrange appointment for patient in concussion clinic without success. In the interim she has been in contact with PAC team and Dr. Sanders's team. She reports that her concussion symptoms have completely resolved and she feels that she could proceed to surgery. Patient's case was reviewed today with Dr. Mosqueda. He felt that with complete resolution of symptoms she could proceed to surgery at this time. Patient and Dr. Sanders's team informed.           Video-Visit Details    Type of service:  Video Visit    Patient verbally consented to video service today: YES      Video Start Time: 0936  Video End Time (time video stopped): 0947    Originating Location (pt. Location): Home    Distant Location (provider location):  home    Mode of Communication:  Video Conference via [employer]      On the day of service:     Prep time: 4 minutes  Visit time: 11 minutes  Documentation time: 33 minutes  ------------------------------------------  Total time: 48 minutes      Chery Magaan PA-C  Preoperative Assessment Center  [facility]  Phone: [phone]  Fax: [phone]

## 2022-01-19 NOTE — H&P (VIEW-ONLY)
Pre-Operative H & P     CC:  Preoperative exam to assess for increased cardiopulmonary risk while undergoing surgery and anesthesia.    Date of Encounter: 1/19/2022  Primary Care Physician:  No Ref-Primary, Physician     Reason for visit:   Encounter Diagnosis   Name Primary?     Pre-op evaluation Yes       HPI  Clau Wilder is a 36 year old female who presents for pre-operative H & P in preparation for Athroscopic synovial biopsy Right knee with Dr. Sanders on 2/11/22 at [facility]. Patient is being evaluated for comorbid conditions of Depression      Ms. Wilder has an 8-month history of non-traumatic right knee pain. She endorses pain with squats or when ascending stairs. Imaging revealed synovitis with multiple mass like areas of synovial proliferation. She was seen by Dr. Sanders for further evaluation. She is now scheduled for the above procedure.     History is obtained from the patient and chart review    Hx of abnormal bleeding or anti-platelet use: denies    Menstrual history: Patient's last menstrual period was 08/23/2016.    Prior to Admission Medications  Current Outpatient Medications   Medication Sig Dispense Refill     acetaminophen (TYLENOL) 325 MG tablet Take 325-650 mg by mouth every 6 hours as needed for mild pain       FLUoxetine 20 MG tablet Take 20 mg by mouth every morning        ibuprofen (ADVIL/MOTRIN) 200 MG tablet Take 200 mg by mouth every 4 hours as needed for mild pain       lamoTRIgine (LAMICTAL) 200 MG tablet Take 300 mg by mouth At Bedtime   0     multivitamin w/minerals (MULTI-VITAMIN) tablet Take 1 tablet by mouth every morning         Family History  Family History   Problem Relation Age of Onset     Thyroid Cancer Mother      Other Cancer Mother         Thyroid cancer     Lupus Maternal Grandmother      Aneurysm Paternal Grandfather        The complete review of systems is negative other than noted in the HPI or here.     Anesthesia  Pre-Procedure Evaluation    Patient: Clau Wilder   MRN: [id number] : 1985        Preoperative Diagnosis: * No surgery found *    Procedure :   Video Visit       Past Medical History:   Diagnosis Date     Calculi, ureter      Depressive disorder      IUD migration     perforation     Mental disorder       Past Surgical History:   Procedure Laterality Date      SECTION       [id number] with stone removal       HYSTEROSCOPY DIAGNOSTIC  2016     LAPAROSCOPY OPERATIVE ADULT  2016    removal of ectopic IUD     MT HAND/FINGER SURGERY UNLISTED  10.6.2021     MT STOMACH SURGERY PROCEDURE UNLISTED      , IUD retrieval      Allergies   Allergen Reactions     Erythromycin Nausea and Vomiting      Social History     Tobacco Use     Smoking status: Never Smoker     Smokeless tobacco: Never Used   Substance Use Topics     Alcohol use: Yes     Comment: occ.      Wt Readings from Last 1 Encounters:   01/10/22 76.2 kg (168 lb)        Anesthesia Evaluation        ROS/MED HX  ENT/Pulmonary:  - neg pulmonary ROS  (-) tobacco use   Neurologic:  - neg neurologic ROS     Cardiovascular:    (-) taking anticoagulants/antiplatelets   METS/Exercise Tolerance:     Hematologic:     (+) anemia,  (-) history of blood transfusion   Musculoskeletal: Comment: Knee pain      GI/Hepatic:    (-) GERD   Renal/Genitourinary:  - neg Renal ROS     Endo:  - neg endo ROS     Psychiatric/Substance Use:     (+) psychiatric history depression     Infectious Disease:  - neg infectious disease ROS     Malignancy:  - neg malignancy ROS     Other:               OUTSIDE LABS:  CBC: No results found for: WBC, HGB, HCT, PLT  BMP: No results found for: NA, POTASSIUM, CHLORIDE, CO2, BUN, CR, GLC  COAGS: No results found for: PTT, INR, FIBR  POC:   Lab Results   Component Value Date    HCG Negative 01/15/2007     HEPATIC: No results found for: ALBUMIN, PROTTOTAL, ALT, AST, GGT, ALKPHOS, BILITOTAL, BILIDIRECT,  KEIRY  OTHER: No results found for: PH, LACT, A1C, JORGE, PHOS, MAG, LIPASE, AMYLASE, TSH, T4, T3, CRP, SED       Virtual visit -  No vitals were obtained       Physical Exam  Constitutional: Awake, alert, cooperative, no apparent distress, and appears stated age.  HENT: Normocephalic  Respiratory: non labored breathing   Neurologic: Awake, alert, oriented to name, place and time.   Neuropsychiatric: Calm, cooperative. Normal affect.         PRIOR LABS/DIAGNOSTIC STUDIES:   All labs and imaging personally reviewed       EKG/ stress test - if available please see in ROS above   No results found.  No flowsheet data found.    The patient's records and results personally reviewed by this provider.     Outside records reviewed from: care everywhere      ASSESSMENT and PLAN    Clau Wilder is a 36-year-old female scheduled for Athroscopic synovial biopsy Right knee on 2/11/22 by Dr. Sanders in treatment of right knee pain.  PAC referral for risk assessment and optimization for anesthesia with comorbid conditions of depression:        Pre-operative considerations:    1.  Cardiac:  Functional status- METS >4. Denies cardiac symptoms.  low risk surgery with 0.4% (RCRI #) risk of major adverse cardiac event.        2.  Pulm:  BRANDON risk: low  ~non smoker        3. GI:  Risk of PONV score = 3.  If > 2, anti-emetic intervention recommended.        4. psych: depression using fluoxetine and Lamictal      5. Msk: right knee pain with the above procedure now planned       6. Neuro: concussion 2 weeks ago after slipping on ice in the parking lot. Denies LOC. She reports she continues to get intermittent headaches (endorsess current headache during our visit today) and irritability. She was referred to the concussion clinic, but reports clinic is booked out until March. We typically recommend delaying anesthesia until complete resolution of symptoms. I have placed neurology referral for optimization prior to surgery. We will help to  schedule this appointment. I will alert surgeon to recommendations.     VTE risk: 0.26%      Patient is optimized and is acceptable candidate for the proposed procedure, pending neurology recommendations or resolution in post concussion symptoms     Final plan per anesthesiologist on day of surgery.      Patient discussed with Dr. Timmons    ** Patient's visit was done virtually today.  A full physical exam was not completed.  Please refer to the physical examination documented by the anesthesiologist in the anesthesia record on the day of surgery. **    Arrival time, NPO, shower and medication instructions provided by nursing staff today.    ADDENDUM: 2/2/22  Multiple attempts made to arrange appointment for patient in concussion clinic without success. In the interim she has been in contact with PAC team and Dr. Sanders's team. She reports that her concussion symptoms have completely resolved and she feels that she could proceed to surgery. Patient's case was reviewed today with Dr. Mosqueda. He felt that with complete resolution of symptoms she could proceed to surgery at this time. Patient and Dr. Sanders's team informed.           Video-Visit Details    Type of service:  Video Visit    Patient verbally consented to video service today: YES      Video Start Time: 0936  Video End Time (time video stopped): 0947    Originating Location (pt. Location): Home    Distant Location (provider location):  home    Mode of Communication:  Video Conference via indico      On the day of service:     Prep time: 4 minutes  Visit time: 11 minutes  Documentation time: 33 minutes  ------------------------------------------  Total time: 48 minutes      Chery Magana PA-C  Preoperative Assessment Center  [facility]  Clinic and Surgery Center  Phone: [phone]  Fax: [phone]

## 2022-01-20 ENCOUNTER — TELEPHONE (OUTPATIENT)
Dept: PHYSICAL MEDICINE AND REHAB | Facility: CLINIC | Age: 37
End: 2022-01-20
Payer: COMMERCIAL

## 2022-01-20 ENCOUNTER — TELEPHONE (OUTPATIENT)
Dept: ORTHOPEDICS | Facility: CLINIC | Age: 37
End: 2022-01-20
Payer: COMMERCIAL

## 2022-01-20 ENCOUNTER — MYC MEDICAL ADVICE (OUTPATIENT)
Dept: SURGERY | Facility: CLINIC | Age: 37
End: 2022-01-20
Payer: COMMERCIAL

## 2022-01-20 NOTE — TELEPHONE ENCOUNTER
Returned call after voicemail left.  Clau had questions about her Neurology follow up after her PAC appt yesterday,she has not been contacted.  Provided Clau with number to Neurology clinic, encouraged to reach out and schedule.    Clau will reach out to this RN when she is ready to schedule surgery again based on the recommendations from Neurology.    MARIO DoN, RN  RN Care Coordinator, Dr. Tommy BENSON [facility]

## 2022-01-20 NOTE — TELEPHONE ENCOUNTER
[facility] Call Center    Phone Message    May a detailed message be left on voicemail: yes     Reason for Call: Surgery awaiting sooner appointment    Action Taken: Message routed to:  [facility] ([facility]): PM&R    Travel Screening: Not Applicable

## 2022-01-20 NOTE — TELEPHONE ENCOUNTER
Message left for Clau that due to here concussion/on-going symptoms identified at her PAC appt, we will need to post pone her surgery for now until cleared by anesthesia.  Will cancel all appointments and follow-up to assist with re-scheduling.    Lis Humphries, MARION, RN  RN Care Coordinator, Dr. Tommy BENSON [facility]

## 2022-01-20 NOTE — TELEPHONE ENCOUNTER
Spoke with Whitley, the  for the Neurology clinic.  Explained we need a sooner appointment for Clau to be seen by a concussion specialist, (Clau has an appointment scheduled in March for this).  Whitley to send note to neurology team for follow up.  Rajni Narvaez RN

## 2022-01-21 ENCOUNTER — TELEPHONE (OUTPATIENT)
Dept: SURGERY | Facility: CLINIC | Age: 37
End: 2022-01-21
Payer: COMMERCIAL

## 2022-01-21 NOTE — TELEPHONE ENCOUNTER
Spoke with Antonino, the concussion  to discuss a sooner appointment for Clau.  Antonino expressed that Clau can be put on a wait list and see if an opening becomes available with Dr. Zhu.  Antonino will call this RN once something opens.  Also, this RN spoke with Clau to update her of this plan.  Clau expressed understanding.  Rajni Narvaez RN

## 2022-01-24 ENCOUNTER — TELEPHONE (OUTPATIENT)
Dept: SURGERY | Facility: CLINIC | Age: 37
End: 2022-01-24
Payer: COMMERCIAL

## 2022-02-01 ENCOUNTER — TELEPHONE (OUTPATIENT)
Dept: SURGERY | Facility: CLINIC | Age: 37
End: 2022-02-01
Payer: COMMERCIAL

## 2022-02-01 ENCOUNTER — TELEPHONE (OUTPATIENT)
Dept: ORTHOPEDICS | Facility: CLINIC | Age: 37
End: 2022-02-01
Payer: COMMERCIAL

## 2022-02-01 NOTE — TELEPHONE ENCOUNTER
Called Clau to follow up on an appointment with the concussion clinic; left a voice message, will send Clau a My Chart message and follow up.  Rajni Narvaez RN

## 2022-02-01 NOTE — TELEPHONE ENCOUNTER
Returned call to Clau after voicemail left.  Clau discussed that it has been one month since her concussion and her symptoms have resolved.  Clau expressed that she has not been able to get scheduled in the concussion clinic nor with a neurologist.  This RN discussed that the PAC clinic has her procedure on hold pending clearance by Neurology.  Provided Clau with number to PAC clinic, encouraged her to call the clinic and ask to speak with Rajni Narvaez, RN Coordinator, and ask about clearance for surgery.    Clau will follow up with this RN after getting more direction from the PAC clinic.    MARIO DoN, RN  RN Care Coordinator, Dr. Tommy BENSON [facility]

## 2022-02-03 ENCOUNTER — TELEPHONE (OUTPATIENT)
Dept: ORTHOPEDICS | Facility: CLINIC | Age: 37
End: 2022-02-03
Payer: COMMERCIAL

## 2022-02-03 NOTE — TELEPHONE ENCOUNTER
Follow up call to Clau to discuss the approval from PAC to move forward with surgery scheduling.  Confirmed surgical date of 2/18, which will allow for the PAC preop from 1/19 to be used.  Scheduled COVID test with Clau.  All questions answered for now.    Lis Humphries, MARION, RN  RN Care Coordinator, Dr. Tommy BENSON [facility]

## 2022-02-04 ENCOUNTER — TELEPHONE (OUTPATIENT)
Dept: ORTHOPEDICS | Facility: CLINIC | Age: 37
End: 2022-02-04
Payer: COMMERCIAL

## 2022-02-04 DIAGNOSIS — Z11.59 ENCOUNTER FOR SCREENING FOR OTHER VIRAL DISEASES: Primary | ICD-10-CM

## 2022-02-04 NOTE — TELEPHONE ENCOUNTER
Patient is scheduled for surgery with Dr. Sanders    Spoke with: ELLIS Hays     Date of Surgery: 2/18/2022    Location: [place]    Informed patient they will need an adult  yes    Pre op with Provider n/a    H&P: Scheduled with PAC    Pre-procedure COVID-19 Test: [place] on 2/15/22    Additional imaging/appointments: n/a    Surgery packet: Given in clinic     Additional comments: n/a

## 2022-02-07 NOTE — PROGRESS NOTES
Brief: Arthroscopic Synovial Biopsy of RIGHT knee // HOLD ANTI-BIOTICS  //  Supine // 30 Minutes    Plan: Arthroscopic Synovial Biopsy of RIGHT knee    Background:   1.  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     1. Recommend proceeding with arthroscopic synovial biopsy of the right knee for diagnostic confirmation.     Should this be pigmented villonodular synovitis Clau would likely require anterior and posterior synovectomy's of the right knee.  We discussed the natural history of PVNS, that even following surgery there is likelihood of recurrence.  Discussed that there are some pharmacologic therapies that can be useful in those instances, however there are in some instances undesirable side effects associated with these medications and they are generally second line to surgical intervention.  Should she have persistent symptoms, she may have early degenerative changes of the joint and bony erosion.     Patient Position (indicated by x):   x  Supine      Supine with torso rolled up on a bump      Floppy lateral on torso length bean bag      Lateral decubitus, bean bag, full length      Lateral decubitus, Wixson hip positioner      Safety paddle side supports x 2 clamped to side rail      Lithotomy, both legs in yellow padded leg jmaeson      Lithotomy, single leg in yellow padded leg jameson      Prone on blanket rolls/round gel pad      Prone on Finesse (arched) frame on Beni table    x  Single thigh in orange arthroscopy clamp      Beach chair semi recumbent      Arm out on radiolucent arm table    x  Extremity drape      Revision RAJWINDER drape with plastic side bags for leg      Extremity drape      Shoulder pack drape      Arizemndi catheter             Fracture Table      Beni x-ray  table    x  Regular OR table, leg dropped with operative extremity in orange clamp, non operative leg in yellowfin                  General Equipment Requests (indicated by x):       C-Arm with C-Armor drape      O-Arm with Stealth imaging   x   Sanders Biopsy trephine set w/ K-wire & pituitary rongeurs   X  Small pituitary rongeur (Blanicollely)   x   18G Spinal c 30cc Syrgine c White Finger Loops/Assist    x  30 degree arthroscope    x  shaver    x  biter      [place] BMAC stem cell      Vancomycin 1 gram powder      Zometa 4 mg vials      Depo Medrol steroid      Blunt Pelvic Retractor (.55, Blunt Hohmann with  slight bend)      (1) Portable hand held radiation detector machine for sentinel node biopsy and (2) Lymphazurin      [place] Osteotomes      Specimens and cultures (indicated by x):   x   Tissue cultures, aerobic and anaerobic without gram stain X5     Red and Lavender Top Bottles    x  pathology specimens - fresh    x  pathology specimens - formalin          Lemuel Negrete DO  Adult Reconstruction Fellow  Dept Orthopaedic Surgery, [facility]  [phone] Pager [phone] Office

## 2022-02-08 ENCOUNTER — DOCUMENTATION ONLY (OUTPATIENT)
Dept: ORTHOPEDICS | Facility: CLINIC | Age: 37
End: 2022-02-08
Payer: COMMERCIAL

## 2022-02-08 ENCOUNTER — TELEPHONE (OUTPATIENT)
Dept: ORTHOPEDICS | Facility: CLINIC | Age: 37
End: 2022-02-08
Payer: COMMERCIAL

## 2022-02-08 NOTE — TELEPHONE ENCOUNTER
Returned call to Clau after voicemail left about surgery denial.  This RN reviewed the my chart message she had been sent and discussed that this is an area I do not manage.  I will alert Dr. Sanders's admin of the denial and potential need for peer to peer review with Dr. Sanders.  Encouraged Clau to follow up with  as designated in my chart message.    MARIO DoN, RN  RN Care Coordinator, Dr. Sanders  [facility]

## 2022-02-08 NOTE — PROGRESS NOTES
Per surgery denial letter received, a [id number] is scheduled with Dr. Sanders on 2/9/2022 at 10:30 AM CST

## 2022-02-09 ENCOUNTER — TELEPHONE (OUTPATIENT)
Dept: ORTHOPEDICS | Facility: CLINIC | Age: 37
End: 2022-02-09
Payer: COMMERCIAL

## 2022-02-09 NOTE — TELEPHONE ENCOUNTER
Kelton Sanders MD Goffin, Byron G; Lis Humphries, ELLIS; Ana Mercer MA  Peer to peer done.   [facility] approved this per Dr. Jacobo Painter:   Authoriz code # [id number]       Thanks             Previous Messages       ----- Message -----   From: Pato Carrillo   Sent: 2/8/2022   9:00 AM CST   To: Kelton Sanders MD   Subject: [facility] denial for surgery 2/18, peer to *     Dr Tommy Zhou has denied services for surgery 2/18 as criteria not met and peer to peer cane be done by calling [phone].    [facility] fax:  Kelton Sanders   [address] [place] [address]   Fax: [phone] From: [facility]   Phone: [phone]   Fax: [phone]   An [facility] Medical Director is available to speak with you about this determination.   You can reach a peer reviewer or obtain a copy of the criteria used for this determination by   calling [phone].   CONFIDENTIALITY NOTICE: The attached information to this facsimile transmission is CONFIDENTIAL and is intended   only for the use of the recipient(s) identified above. It may contain confidential and protected health information subject to state   and federal privacy regulations, including the Health Insurance Portability and Accountability Act of 1996 (HIPAA). If you are   not the intended recipient or a person responsible for delivering it to the intended recipient, you are hereby notified that any   disclosure, copying, distribution or use of any of the information contained in or attached to this transmission is STRICTLY   PROHIBITED. If you have received this transmission in error, please notify [facility] immediately by telephone and   destroy the transmission and its attachments without saving them in any manner. Please also notify [facility] that all of the   information contained in or attached to this transmission has been successfully destroyed.   [id number]   [facility]  and [facility] and [employer]  are nonprofit independent licensees of the Blue Cross   and [facility] [employer].   Date: 2/7/2022 CONFIDENTIAL   CASEY MARROQUIN   [facility] [place] [address]   Member Name: CASEY MARROQUIN   YOB: 1985   Member ID: [id number]   Group ID: [id number]   Provider Name: Kelton Sanders   Reference Number: [id number]   Date of Denial: 2/7/2022   Dear CASEY MARROQUIN,   [employer] is an independent company that reviews member health care   services for appropriateness and medical necessity on behalf of Blue Cross and [facility] and [employer] ([employer]). [employer] has received the request for coverage of service   from the above provider. The services or items below have been reviewed by [employer].   Procedure Description Units   Requested   Units   Denied   68395 Diagnostic examination of knee using an   endoscope   1 1   This service(s) or item(s) is not approved because it does not meet the criteria for medical   necessity. Based on the information provided by the requesting practitioner, we have determined   that the service(s) is not medically necessary because:   Based on [employer] Comprehensive Musculoskeletal Management Guideline Comprehensive   Musculoskeletal Management Guideline, : Knee Surgery-Arthroscopic and Open   Procedures Not Available, we cannot approve this request.   Your records do not show that you failed to improve following a trial of at least 3 months of   treatment directed by your doctor. The requested knee surgery is not supported prior to a trial of   treatment for at least 3 months.   The requested service is supported when you have pain which limits your function for at least   six (6) months. This may include a loss of knee function which affects how well you are able to   do your daily activities or meet the demands of your job. Your records do not show that you   have this type of pain and loss of function for at least six  (6) months.   The requested service is supported when the results of a detailed picture study do not show a   problem in your knee which could be the cause of your symptoms. This could be with a   magnetic resonance imaging (MRI) study. It could also be with a computed tomography (CT)   arthrogram which uses contrast injected into your knee joint. Your records do not show these   type of MRI or CT results.    We have told your doctor about this. Please talk to your doctor if you have questions.   Medical necessity is defined as a need for particular services or supplies that a provider   exercising prudent clinical judgment, would provide to a patient for the purpose of preventing,   evaluating, diagnosing or treating an illness, injury, disease or its symptoms and that are:   ? in accordance with generally accepted standards of medical practice;   ? clinically appropriate, in terms of type, frequency, extent, site and duration, and   considered effective for the patient's illness, injury or disease; and   ? not primarily for the convenience of the patient, physician, or other health care provider,   and not more costly than an alternative service or sequence of services at least as likely to   produce equivalent therapeutic or diagnostic results as to the diagnosis or treatment of   that patient's illness, injury or disease.   As stated in your plan document, your coverage provides benefits for only those covered   services, drugs, and supplies that are medically necessary and appropriate for the diagnosis or   treatment of a specific illness, injury, or condition. No benefits will be provided unless it is   determined that the service or supply is medically necessary and appropriate.   For additional information, please see the following section of your plan document: General   Exceptions   You may request any or all of the following documentation free of charge by calling [employer] at   [phone].   ? A copy  of any policy, criteria, guideline, document, record or other information   referenced in making this determination.   ? The credentials or relevant information of the reviewing provider in connection with the   determination.   ? A copy of the diagnosis and/or procedure code including description.   If the treating practitioner would like to discuss this case with the [employer] physician reviewer or   obtain a copy of the criteria used to make this decision prior to initiating the formal appeal   process, please call [employer] Medical Management Department at [phone]. This would   not be considered an appeal. This is a tool used to understand why the denial was issued. If you   wish to request an appeal of this decision, please follow the steps outlined in the appeal rights   descriptions attached to this letter.   This determination has been made for coverage purposes. In all situations the provider must use   his/her professional judgment to provide care he/she believes to be in the best interest of the   patient. As always, the provider and member are responsible for treatment decisions. Although   your health plan will not cover this service, you can choose to receive the treatment at your own   expense or have other sources pay for it. If you receive services after we have denied coverage,   your provider can bill you for the cost of such services. You may also be responsible for payment   of services from out-of-network providers. Please contact your provider to learn how much you   might be charged for this service.   You have the right to contact the [place] Department of [place] at [phone] or toll   free at [phone]. You may also contact the Department of Health and Human Services   Health Insurance Assistance Team at [phone].   Our  are available to help you coordinate your care. We can also work with your   physician or other health care providers. If you would  like to speak with a , please   call us at [phone] (). Our office hours are Monday through Friday from 8 a.m.   to 5 p.m.   If you have questions about your health plan benefits, please call the member services number on   the back of your member ID card. If you have questions about this letter, please call [employer] at [phone] between the hours of 7 a.m. and 7 p.m., Central Time.   Sincerely,   Mariana Burdick MD   Medical Director   Attachment(s): Appeal Rights   cc: Kelton Sanders

## 2022-02-13 ENCOUNTER — ANESTHESIA EVENT (OUTPATIENT)
Dept: SURGERY | Facility: CLINIC | Age: 37
End: 2022-02-13
Payer: COMMERCIAL

## 2022-02-15 ENCOUNTER — LAB (OUTPATIENT)
Dept: LAB | Facility: CLINIC | Age: 37
End: 2022-02-15
Payer: COMMERCIAL

## 2022-02-15 DIAGNOSIS — Z11.59 ENCOUNTER FOR SCREENING FOR OTHER VIRAL DISEASES: ICD-10-CM

## 2022-02-15 PROCEDURE — U0005 INFEC AGEN DETEC AMPLI PROBE: HCPCS

## 2022-02-15 PROCEDURE — U0003 INFECTIOUS AGENT DETECTION BY NUCLEIC ACID (DNA OR RNA); SEVERE ACUTE RESPIRATORY SYNDROME CORONAVIRUS 2 (SARS-COV-2) (CORONAVIRUS DISEASE [COVID-19]), AMPLIFIED PROBE TECHNIQUE, MAKING USE OF HIGH THROUGHPUT TECHNOLOGIES AS DESCRIBED BY CMS-2020-01-R: HCPCS

## 2022-02-16 LAB — SARS-COV-2 RNA RESP QL NAA+PROBE: NEGATIVE

## 2022-02-17 ASSESSMENT — LIFESTYLE VARIABLES: TOBACCO_USE: 0

## 2022-02-17 NOTE — ANESTHESIA PREPROCEDURE EVALUATION
Anesthesia Pre-Procedure Evaluation    Patient: Clau Wilder   MRN: [id number] : 1985        Preoperative Diagnosis: Right knee pain [M25.561]    Procedure : Procedure(s):  Athroscopic synovial biopsy Right knee          Past Medical History:   Diagnosis Date     Calculi, ureter      Depressive disorder      IUD migration     perforation     Mental disorder       Past Surgical History:   Procedure Laterality Date      SECTION       [id number] with stone removal       HYSTEROSCOPY DIAGNOSTIC  2016     LAPAROSCOPY OPERATIVE ADULT  2016    removal of ectopic IUD     ORTHOPEDIC SURGERY      tendon repair ankle     UT HAND/FINGER SURGERY UNLISTED  10/06/2021     UT STOMACH SURGERY PROCEDURE UNLISTED      , IUD retrieval      Allergies   Allergen Reactions     Erythromycin Nausea and Vomiting      Social History     Tobacco Use     Smoking status: Never Smoker     Smokeless tobacco: Never Used   Substance Use Topics     Alcohol use: Yes     Comment: occ.      Wt Readings from Last 1 Encounters:   01/10/22 76.2 kg (168 lb)        Anesthesia Evaluation   Pt has had prior anesthetic.     No history of anesthetic complications       ROS/MED HX  ENT/Pulmonary:     (+) sleep apnea, mild, doesn't use CPAP,  (-) tobacco use   Neurologic: Comment: Concussion 2 weeks ago, intermittent headaches      Cardiovascular:    (-) taking anticoagulants/antiplatelets   METS/Exercise Tolerance: >4 METS Comment: Yoga, walking   Hematologic:     (+) anemia,  (-) history of blood transfusion   Musculoskeletal: Comment: Knee pain      GI/Hepatic:    (-) GERD   Renal/Genitourinary:  - neg Renal ROS     Endo:  - neg endo ROS     Psychiatric/Substance Use:     (+) psychiatric history depression     Infectious Disease:  - neg infectious disease ROS     Malignancy:  - neg malignancy ROS     Other:            Physical Exam    Airway        Mallampati: II   TM distance: > 3 FB   Neck ROM: full   Mouth  opening: > 3 cm    Respiratory Devices and Support         Dental  no notable dental history         Cardiovascular   cardiovascular exam normal          Pulmonary   pulmonary exam normal                OUTSIDE LABS:  CBC: No results found for: WBC, HGB, HCT, PLT  BMP: No results found for: NA, POTASSIUM, CHLORIDE, CO2, BUN, CR, GLC  COAGS: No results found for: PTT, INR, FIBR  POC:   Lab Results   Component Value Date    HCG Negative 01/15/2007     HEPATIC: No results found for: ALBUMIN, PROTTOTAL, ALT, AST, GGT, ALKPHOS, BILITOTAL, BILIDIRECT, KEIRY  OTHER: No results found for: PH, LACT, A1C, JORGE, PHOS, MAG, LIPASE, AMYLASE, TSH, T4, T3, CRP, SED    Anesthesia Plan    ASA Status:  2   NPO Status:  NPO Appropriate    Anesthesia Type: General.     - Airway: LMA   Induction: Intravenous, Propofol.   Maintenance: Balanced.        Consents    Anesthesia Plan(s) and associated risks, benefits, and realistic alternatives discussed. Questions answered and patient/representative(s) expressed understanding.    - Discussed:     - Discussed with:  Patient      - Extended Intubation/Ventilatory Support Discussed: No.      - Patient is DNR/DNI Status: No    Use of blood products discussed: No .     Postoperative Care    Pain management: IV analgesics, Oral pain medications, Multi-modal analgesia.   PONV prophylaxis: Dexamethasone or Solumedrol, Ondansetron (or other 5HT-3)     Comments:    Other Comments: Patient to be  involved in PONV study          PAC Discussion and Assessment    ASA Classification: 2  Case is suitable for: [place]  Anesthetic techniques and relevant risks discussed: GA                  PAC Resident/NP Anesthesia Assessment: Clau Wilder is a 36-year-old female scheduled for Athroscopic synovial biopsy Right knee on 2/11/22 by Dr. Sanders in treatment of right knee pain.  PAC referral for risk assessment and optimization for anesthesia with comorbid conditions of depression:        Pre-operative  considerations:    1.  Cardiac:  Functional status- METS >4. Denies cardiac symptoms.  low risk surgery with 0.4% (RCRI #) risk of major adverse cardiac event.        2.  Pulm:  BRANDON risk: low  ~non smoker        3. GI:  Risk of PONV score = 3.  If > 2, anti-emetic intervention recommended.        4. psych: depression using fluoxetine and Lamictal      5. Msk: right knee pain with the above procedure now planned       6. Neuro: concussion 2 weeks ago after slipping on ice in the parking lot. Denies LOC. She reports she continues to get intermittent headaches (endorsess current headache during our visit today) and irritability. She was referred to the concussion clinic, but reports clinic is booked out until March. We typically recommend delaying anesthesia until complete resolution of symptoms. I have placed neurology referral for optimization prior to surgery. We will help to schedule this appointment. I will alert surgeon to recommendations.     VTE risk: 0.26%      Patient is optimized and is acceptable candidate for the proposed procedure, pending neurology recommendations or resolution in post concussion symptoms       **Physical exam and vital signs not completed today as this visit was scheduled as a virtual visit during Covid 19 pandemic. Physical exam should be completed the DOS in pre-op**    Final plan per anesthesiologist on day of surgery.      Patient discussed with Dr. Timmons      **For further details of assessment, testing, and physical exam please see H and P completed on same date.      ADDENDUM: 2/2/22  Multiple attempts made to arrange appointment for patient in concussion clinic without success. In the interim she has been in contact with PAC team and Dr. Sanders's team. She reports that her concussion symptoms have completely resolved and she feels that she could proceed to surgery. Patient's case was reviewed today with Dr. Mosqueda. He felt that with complete resolution of symptoms she could  proceed to surgery at this time. Patient and Dr. Sanders's team informed.           LIBIA Mcintyre MD

## 2022-02-18 ENCOUNTER — ANESTHESIA (OUTPATIENT)
Dept: SURGERY | Facility: CLINIC | Age: 37
End: 2022-02-18
Payer: COMMERCIAL

## 2022-02-18 ENCOUNTER — HOSPITAL ENCOUNTER (OUTPATIENT)
Facility: CLINIC | Age: 37
Discharge: HOME | End: 2022-02-18
Attending: ORTHOPAEDIC SURGERY | Admitting: ORTHOPAEDIC SURGERY
Payer: COMMERCIAL

## 2022-02-18 VITALS
WEIGHT: 162.92 LBS | RESPIRATION RATE: 16 BRPM | TEMPERATURE: 97.8 F | HEIGHT: 65 IN | HEART RATE: 93 BPM | SYSTOLIC BLOOD PRESSURE: 116 MMHG | BODY MASS INDEX: 27.14 KG/M2 | OXYGEN SATURATION: 99 % | DIASTOLIC BLOOD PRESSURE: 70 MMHG

## 2022-02-18 DIAGNOSIS — G89.29 CHRONIC PAIN OF RIGHT KNEE: Primary | ICD-10-CM

## 2022-02-18 DIAGNOSIS — M25.561 CHRONIC PAIN OF RIGHT KNEE: Primary | ICD-10-CM

## 2022-02-18 LAB — GLUCOSE BLDC GLUCOMTR-MCNC: 88 MG/DL (ref 70–99)

## 2022-02-18 PROCEDURE — 370N000017 HC ANESTHESIA TECHNICAL FEE, PER MIN: Performed by: ORTHOPAEDIC SURGERY

## 2022-02-18 PROCEDURE — 710N000012 HC RECOVERY PHASE 2, PER MINUTE: Performed by: ORTHOPAEDIC SURGERY

## 2022-02-18 PROCEDURE — 250N000011 HC RX IP 250 OP 636: Performed by: STUDENT IN AN ORGANIZED HEALTH CARE EDUCATION/TRAINING PROGRAM

## 2022-02-18 PROCEDURE — 272N000001 HC OR GENERAL SUPPLY STERILE: Performed by: ORTHOPAEDIC SURGERY

## 2022-02-18 PROCEDURE — 250N000011 HC RX IP 250 OP 636: Performed by: PHYSICIAN ASSISTANT

## 2022-02-18 PROCEDURE — 250N000025 HC SEVOFLURANE, PER MIN: Performed by: ORTHOPAEDIC SURGERY

## 2022-02-18 PROCEDURE — 999N000141 HC STATISTIC PRE-PROCEDURE NURSING ASSESSMENT: Performed by: ORTHOPAEDIC SURGERY

## 2022-02-18 PROCEDURE — 88307 TISSUE EXAM BY PATHOLOGIST: CPT | Mod: 26 | Performed by: PATHOLOGY

## 2022-02-18 PROCEDURE — 360N000076 HC SURGERY LEVEL 3, PER MIN: Performed by: ORTHOPAEDIC SURGERY

## 2022-02-18 PROCEDURE — 258N000001 HC RX 258: Performed by: ORTHOPAEDIC SURGERY

## 2022-02-18 PROCEDURE — 250N000013 HC RX MED GY IP 250 OP 250 PS 637: Performed by: STUDENT IN AN ORGANIZED HEALTH CARE EDUCATION/TRAINING PROGRAM

## 2022-02-18 PROCEDURE — 710N000010 HC RECOVERY PHASE 1, LEVEL 2, PER MIN: Performed by: ORTHOPAEDIC SURGERY

## 2022-02-18 PROCEDURE — 250N000009 HC RX 250: Performed by: ORTHOPAEDIC SURGERY

## 2022-02-18 PROCEDURE — 250N000009 HC RX 250: Performed by: STUDENT IN AN ORGANIZED HEALTH CARE EDUCATION/TRAINING PROGRAM

## 2022-02-18 PROCEDURE — 29870 ARTHRS KNEE DX W/WO SYN BX: CPT | Mod: RT | Performed by: ORTHOPAEDIC SURGERY

## 2022-02-18 PROCEDURE — 82962 GLUCOSE BLOOD TEST: CPT

## 2022-02-18 PROCEDURE — 258N000003 HC RX IP 258 OP 636: Performed by: STUDENT IN AN ORGANIZED HEALTH CARE EDUCATION/TRAINING PROGRAM

## 2022-02-18 PROCEDURE — 88307 TISSUE EXAM BY PATHOLOGIST: CPT | Mod: TC | Performed by: ORTHOPAEDIC SURGERY

## 2022-02-18 RX ORDER — LABETALOL HYDROCHLORIDE 5 MG/ML
10 INJECTION, SOLUTION INTRAVENOUS
Status: DISCONTINUED | OUTPATIENT
Start: 2022-02-18 | End: 2022-02-18 | Stop reason: HOSPADM

## 2022-02-18 RX ORDER — LIDOCAINE HYDROCHLORIDE 20 MG/ML
INJECTION, SOLUTION INFILTRATION; PERINEURAL PRN
Status: DISCONTINUED | OUTPATIENT
Start: 2022-02-18 | End: 2022-02-18

## 2022-02-18 RX ORDER — EPHEDRINE SULFATE 50 MG/ML
INJECTION, SOLUTION INTRAMUSCULAR; INTRAVENOUS; SUBCUTANEOUS PRN
Status: DISCONTINUED | OUTPATIENT
Start: 2022-02-18 | End: 2022-02-18

## 2022-02-18 RX ORDER — HYDRALAZINE HYDROCHLORIDE 20 MG/ML
2.5-5 INJECTION INTRAMUSCULAR; INTRAVENOUS EVERY 10 MIN PRN
Status: DISCONTINUED | OUTPATIENT
Start: 2022-02-18 | End: 2022-02-18 | Stop reason: HOSPADM

## 2022-02-18 RX ORDER — FENTANYL CITRATE-0.9 % NACL/PF 10 MCG/ML
PLASTIC BAG, INJECTION (ML) INTRAVENOUS PRN
Status: DISCONTINUED | OUTPATIENT
Start: 2022-02-18 | End: 2022-02-18

## 2022-02-18 RX ORDER — ONDANSETRON 4 MG/1
4 TABLET, ORALLY DISINTEGRATING ORAL EVERY 30 MIN PRN
Status: DISCONTINUED | OUTPATIENT
Start: 2022-02-18 | End: 2022-02-18 | Stop reason: HOSPADM

## 2022-02-18 RX ORDER — ONDANSETRON 2 MG/ML
4 INJECTION INTRAMUSCULAR; INTRAVENOUS EVERY 30 MIN PRN
Status: DISCONTINUED | OUTPATIENT
Start: 2022-02-18 | End: 2022-02-18 | Stop reason: HOSPADM

## 2022-02-18 RX ORDER — OXYCODONE HYDROCHLORIDE 5 MG/1
5-10 TABLET ORAL EVERY 4 HOURS PRN
Qty: 15 TABLET | Refills: 0 | Status: SHIPPED | OUTPATIENT
Start: 2022-02-18 | End: 2022-03-14

## 2022-02-18 RX ORDER — AMOXICILLIN 250 MG
1-2 CAPSULE ORAL 2 TIMES DAILY
Qty: 30 TABLET | Refills: 0 | Status: SHIPPED | OUTPATIENT
Start: 2022-02-18 | End: 2022-03-14

## 2022-02-18 RX ORDER — FENTANYL CITRATE 50 UG/ML
25 INJECTION, SOLUTION INTRAMUSCULAR; INTRAVENOUS
Status: DISCONTINUED | OUTPATIENT
Start: 2022-02-18 | End: 2022-02-18 | Stop reason: HOSPADM

## 2022-02-18 RX ORDER — ACETAMINOPHEN 325 MG/1
975 TABLET ORAL ONCE
Status: COMPLETED | OUTPATIENT
Start: 2022-02-18 | End: 2022-02-18

## 2022-02-18 RX ORDER — CEFAZOLIN SODIUM/WATER 2 G/20 ML
2 SYRINGE (ML) INTRAVENOUS
Status: COMPLETED | OUTPATIENT
Start: 2022-02-18 | End: 2022-02-18

## 2022-02-18 RX ORDER — ACETAMINOPHEN 325 MG/1
650 TABLET ORAL
Status: DISCONTINUED | OUTPATIENT
Start: 2022-02-18 | End: 2022-02-18 | Stop reason: HOSPADM

## 2022-02-18 RX ORDER — ALBUTEROL SULFATE 0.83 MG/ML
2.5 SOLUTION RESPIRATORY (INHALATION) EVERY 4 HOURS PRN
Status: DISCONTINUED | OUTPATIENT
Start: 2022-02-18 | End: 2022-02-18 | Stop reason: HOSPADM

## 2022-02-18 RX ORDER — MEPERIDINE HYDROCHLORIDE 25 MG/ML
12.5 INJECTION INTRAMUSCULAR; INTRAVENOUS; SUBCUTANEOUS
Status: DISCONTINUED | OUTPATIENT
Start: 2022-02-18 | End: 2022-02-18 | Stop reason: HOSPADM

## 2022-02-18 RX ORDER — LAMOTRIGINE 150 MG/1
300 TABLET ORAL AT BEDTIME
COMMUNITY
Start: 2021-05-17

## 2022-02-18 RX ORDER — ONDANSETRON 2 MG/ML
INJECTION INTRAMUSCULAR; INTRAVENOUS PRN
Status: DISCONTINUED | OUTPATIENT
Start: 2022-02-18 | End: 2022-02-18

## 2022-02-18 RX ORDER — LIDOCAINE 40 MG/G
CREAM TOPICAL
Status: DISCONTINUED | OUTPATIENT
Start: 2022-02-18 | End: 2022-02-18 | Stop reason: HOSPADM

## 2022-02-18 RX ORDER — FENTANYL CITRATE 50 UG/ML
25 INJECTION, SOLUTION INTRAMUSCULAR; INTRAVENOUS EVERY 5 MIN PRN
Status: DISCONTINUED | OUTPATIENT
Start: 2022-02-18 | End: 2022-02-18 | Stop reason: HOSPADM

## 2022-02-18 RX ORDER — FLUOXETINE 10 MG/1
30 CAPSULE ORAL
COMMUNITY
Start: 2021-05-17 | End: 2022-03-14

## 2022-02-18 RX ORDER — SODIUM CHLORIDE, SODIUM LACTATE, POTASSIUM CHLORIDE, CALCIUM CHLORIDE 600; 310; 30; 20 MG/100ML; MG/100ML; MG/100ML; MG/100ML
INJECTION, SOLUTION INTRAVENOUS CONTINUOUS PRN
Status: DISCONTINUED | OUTPATIENT
Start: 2022-02-18 | End: 2022-02-18

## 2022-02-18 RX ORDER — FENTANYL CITRATE 50 UG/ML
INJECTION, SOLUTION INTRAMUSCULAR; INTRAVENOUS PRN
Status: DISCONTINUED | OUTPATIENT
Start: 2022-02-18 | End: 2022-02-18

## 2022-02-18 RX ORDER — BUPIVACAINE HYDROCHLORIDE AND EPINEPHRINE 2.5; 5 MG/ML; UG/ML
INJECTION, SOLUTION INFILTRATION; PERINEURAL PRN
Status: DISCONTINUED | OUTPATIENT
Start: 2022-02-18 | End: 2022-02-18 | Stop reason: HOSPADM

## 2022-02-18 RX ORDER — DEXAMETHASONE SODIUM PHOSPHATE 4 MG/ML
INJECTION, SOLUTION INTRA-ARTICULAR; INTRALESIONAL; INTRAMUSCULAR; INTRAVENOUS; SOFT TISSUE PRN
Status: DISCONTINUED | OUTPATIENT
Start: 2022-02-18 | End: 2022-02-18

## 2022-02-18 RX ORDER — ACETAMINOPHEN 325 MG/1
975 TABLET ORAL ONCE
Status: DISCONTINUED | OUTPATIENT
Start: 2022-02-18 | End: 2022-02-18 | Stop reason: HOSPADM

## 2022-02-18 RX ORDER — ONDANSETRON 4 MG/1
4-8 TABLET, ORALLY DISINTEGRATING ORAL EVERY 8 HOURS PRN
Qty: 4 TABLET | Refills: 0 | Status: SHIPPED | OUTPATIENT
Start: 2022-02-18 | End: 2022-03-14

## 2022-02-18 RX ORDER — KETOROLAC TROMETHAMINE 30 MG/ML
15 INJECTION, SOLUTION INTRAMUSCULAR; INTRAVENOUS EVERY 6 HOURS PRN
Status: DISCONTINUED | OUTPATIENT
Start: 2022-02-18 | End: 2022-02-18 | Stop reason: HOSPADM

## 2022-02-18 RX ORDER — METHOCARBAMOL 750 MG/1
750 TABLET, FILM COATED ORAL
Status: DISCONTINUED | OUTPATIENT
Start: 2022-02-18 | End: 2022-02-18 | Stop reason: HOSPADM

## 2022-02-18 RX ORDER — SODIUM CHLORIDE, SODIUM LACTATE, POTASSIUM CHLORIDE, CALCIUM CHLORIDE 600; 310; 30; 20 MG/100ML; MG/100ML; MG/100ML; MG/100ML
INJECTION, SOLUTION INTRAVENOUS CONTINUOUS
Status: DISCONTINUED | OUTPATIENT
Start: 2022-02-18 | End: 2022-02-18 | Stop reason: HOSPADM

## 2022-02-18 RX ORDER — HYDROMORPHONE HYDROCHLORIDE 1 MG/ML
0.2 INJECTION, SOLUTION INTRAMUSCULAR; INTRAVENOUS; SUBCUTANEOUS EVERY 5 MIN PRN
Status: DISCONTINUED | OUTPATIENT
Start: 2022-02-18 | End: 2022-02-18 | Stop reason: HOSPADM

## 2022-02-18 RX ORDER — LORAZEPAM 2 MG/ML
.5-1 INJECTION INTRAMUSCULAR
Status: DISCONTINUED | OUTPATIENT
Start: 2022-02-18 | End: 2022-02-18 | Stop reason: HOSPADM

## 2022-02-18 RX ORDER — KETOROLAC TROMETHAMINE 30 MG/ML
INJECTION, SOLUTION INTRAMUSCULAR; INTRAVENOUS PRN
Status: DISCONTINUED | OUTPATIENT
Start: 2022-02-18 | End: 2022-02-18

## 2022-02-18 RX ORDER — OXYCODONE HYDROCHLORIDE 5 MG/1
5 TABLET ORAL
Status: DISCONTINUED | OUTPATIENT
Start: 2022-02-18 | End: 2022-02-18 | Stop reason: HOSPADM

## 2022-02-18 RX ORDER — ONDANSETRON 4 MG/1
4 TABLET, ORALLY DISINTEGRATING ORAL
Status: DISCONTINUED | OUTPATIENT
Start: 2022-02-18 | End: 2022-02-18 | Stop reason: HOSPADM

## 2022-02-18 RX ORDER — PROPOFOL 10 MG/ML
INJECTION, EMULSION INTRAVENOUS PRN
Status: DISCONTINUED | OUTPATIENT
Start: 2022-02-18 | End: 2022-02-18

## 2022-02-18 RX ORDER — CEFAZOLIN SODIUM/WATER 2 G/20 ML
2 SYRINGE (ML) INTRAVENOUS SEE ADMIN INSTRUCTIONS
Status: DISCONTINUED | OUTPATIENT
Start: 2022-02-18 | End: 2022-02-18 | Stop reason: HOSPADM

## 2022-02-18 RX ORDER — HYDROXYZINE HYDROCHLORIDE 25 MG/1
25 TABLET, FILM COATED ORAL
Status: DISCONTINUED | OUTPATIENT
Start: 2022-02-18 | End: 2022-02-18 | Stop reason: HOSPADM

## 2022-02-18 RX ORDER — OXYCODONE HYDROCHLORIDE 5 MG/1
5 TABLET ORAL EVERY 4 HOURS PRN
Status: DISCONTINUED | OUTPATIENT
Start: 2022-02-18 | End: 2022-02-18 | Stop reason: HOSPADM

## 2022-02-18 RX ADMIN — FENTANYL CITRATE 25 MCG: 50 INJECTION, SOLUTION INTRAMUSCULAR; INTRAVENOUS at 12:28

## 2022-02-18 RX ADMIN — FENTANYL CITRATE 25 MCG: 50 INJECTION, SOLUTION INTRAMUSCULAR; INTRAVENOUS at 13:27

## 2022-02-18 RX ADMIN — ACETAMINOPHEN 975 MG: 325 TABLET, FILM COATED ORAL at 10:56

## 2022-02-18 RX ADMIN — FENTANYL CITRATE 50 MCG: 50 INJECTION, SOLUTION INTRAMUSCULAR; INTRAVENOUS at 11:47

## 2022-02-18 RX ADMIN — Medication 5 MG: at 11:52

## 2022-02-18 RX ADMIN — Medication 100 MCG: at 12:07

## 2022-02-18 RX ADMIN — SODIUM CHLORIDE, POTASSIUM CHLORIDE, SODIUM LACTATE AND CALCIUM CHLORIDE: 600; 310; 30; 20 INJECTION, SOLUTION INTRAVENOUS at 11:43

## 2022-02-18 RX ADMIN — FENTANYL CITRATE 25 MCG: 50 INJECTION, SOLUTION INTRAMUSCULAR; INTRAVENOUS at 13:22

## 2022-02-18 RX ADMIN — FENTANYL CITRATE 25 MCG: 50 INJECTION, SOLUTION INTRAMUSCULAR; INTRAVENOUS at 13:33

## 2022-02-18 RX ADMIN — HYDROMORPHONE HYDROCHLORIDE 0.2 MG: 1 INJECTION, SOLUTION INTRAMUSCULAR; INTRAVENOUS; SUBCUTANEOUS at 13:52

## 2022-02-18 RX ADMIN — Medication 5 MG: at 11:55

## 2022-02-18 RX ADMIN — Medication 2 G: at 11:42

## 2022-02-18 RX ADMIN — HYDROMORPHONE HYDROCHLORIDE 0.2 MG: 1 INJECTION, SOLUTION INTRAMUSCULAR; INTRAVENOUS; SUBCUTANEOUS at 13:45

## 2022-02-18 RX ADMIN — PROPOFOL 200 MG: 10 INJECTION, EMULSION INTRAVENOUS at 11:47

## 2022-02-18 RX ADMIN — OXYCODONE HYDROCHLORIDE 5 MG: 5 TABLET ORAL at 14:01

## 2022-02-18 RX ADMIN — ONDANSETRON 4 MG: 2 INJECTION INTRAMUSCULAR; INTRAVENOUS at 12:46

## 2022-02-18 RX ADMIN — LIDOCAINE HYDROCHLORIDE 60 MG: 20 INJECTION, SOLUTION INFILTRATION; PERINEURAL at 11:47

## 2022-02-18 RX ADMIN — MIDAZOLAM 2 MG: 1 INJECTION INTRAMUSCULAR; INTRAVENOUS at 11:42

## 2022-02-18 RX ADMIN — DEXAMETHASONE SODIUM PHOSPHATE 8 MG: 4 INJECTION, SOLUTION INTRAMUSCULAR; INTRAVENOUS at 12:46

## 2022-02-18 RX ADMIN — KETOROLAC TROMETHAMINE 15 MG: 30 INJECTION, SOLUTION INTRAMUSCULAR at 12:39

## 2022-02-18 RX ADMIN — Medication 5 MG: at 12:21

## 2022-02-18 RX ADMIN — MIDAZOLAM 2 MG: 1 INJECTION INTRAMUSCULAR; INTRAVENOUS at 11:45

## 2022-02-18 RX ADMIN — FENTANYL CITRATE 25 MCG: 50 INJECTION, SOLUTION INTRAMUSCULAR; INTRAVENOUS at 13:17

## 2022-02-18 NOTE — DISCHARGE INSTRUCTIONS
"Safety Tips for Using Crutches    Crutch Fit:    Assume good standing posture with shoulders relaxed and crutch tips 6-8 inches out from the side of the foot.    The underarm pad should fall 2-3 fingers width below the armpit.    The handgrip is positioned level with the wrist to allow 30  flexion at the elbow.    Safety Tips:    Bear weight on your hands, not on your armpits.    Do not add extra padding to the underarm pad. This will, in effect, lengthen the crutches and increase risk of nerve injury.    Wear flat, properly fitting shoes. Do not walk in stocking feet, high heels or slippers.    Household hazards:  --Throw rugs should be removed from floors.  --Stairs should be cleared of obstacles.  --Use extra caution on slippery, highly polished, littered or uneven floor surfaces.  --Check for electric cords.    Check crutch tips for excessive wear and keep wing nuts tight.    While walking, look forward with  head up  and  eyes open.  Take equal length steps.    Use BOTH crutches.    Stairs Sequence:    UP: \"Good\" leg first, followed by  bad  leg, then crutches.    DOWN: Crutches, followed by  bad  leg, \"good\" leg.     Walking with Crutches:    Move both crutches forward at the same time.    Non-Weight Bearing (NWB):  Hold the involved leg up and swing through the crutches with the involved leg. The involved leg does not touch the floor.    Toe Touch Weight Bearing (TTWB): Move the involved leg forward. Rest it lightly on the floor for balance only. Step through the crutches with the uninvolved leg.    Partial Weight Bearing (PWB): Move the involved leg forward. Step down the weight of the leg only.  Step through the crutches with the uninvolved leg.    Weight Bearing As Tolerated (WBAT): Move the involved leg forward. Put as much pressure through the involved leg as you can tolerate comfortably. Then step through the crutches with the uninvolved leg.    Rev. 4/2014    Same-Day Surgery   Adult Discharge Orders & " Instructions     For 24 hours after surgery:  1. Get plenty of rest.  A responsible adult must stay with you for at least 24 hours after you leave the hospital.   2. Pain medication can slow your reflexes. Do not drive or use heavy equipment.  If you have weakness or tingling, don't drive or use heavy equipment until this feeling goes away.  3. Mixing alcohol and pain medication can cause dizziness and slow your breathing. It can even be fatal. Do not drink alcohol while taking pain medication.  4. Avoid strenuous or risky activities.  Ask for help when climbing stairs.   5. You may feel lightheaded.  If so, sit for a few minutes before standing.  Have someone help you get up.   6. If you have nausea (feel sick to your stomach), drink only clear liquids such as apple juice, ginger ale, broth or 7-Up.  Rest may also help.  Be sure to drink enough fluids.  Move to a regular diet as you feel able. Take pain medications with a small amount of solid food, such as toast or crackers, to avoid nausea.   7. A slight fever is normal. Call the doctor if your fever is over 100 F (37.7 C) (taken under the tongue) or lasts longer than 24 hours.  8. You may have a dry mouth, muscle aches, trouble sleeping or a sore throat.  These symptoms should go away after 24 hours.  9. Do not make important or legal decisions.   Pain Management:      1. Take pain medication (if prescribed) for pain as directed by your physician.        2. WARNING: If the pain medication you have been prescribed contains Tylenol  (acetaminophen), DO NOT take additional doses of Tylenol (acetaminophen).     Call your doctor for any of the followin.  Signs of infection (fever, growing tenderness at the surgery site, severe pain, a large amount of drainage or bleeding, foul-smelling drainage, redness, swelling).    2.  It has been over 8 to 10 hours since surgery and you are still not able to urinate (pee).    3.  Headache for over 24 hours.    4.  Numbness,  tingling or weakness the day after surgery (if you had spinal anesthesia).  To contact a doctor, call _____Dr. LUCIA [facility], [phone]________________________________ or:      [phone] and ask for the Resident On Call for:          ____________________ORTHO______________________ (answered 24 hours a day)      Emergency Department:  [place]: [phone]  [place] [facility]: [phone]

## 2022-02-18 NOTE — ANESTHESIA CARE TRANSFER NOTE
Patient: Clau Wilder    Procedure: Procedure(s):  Athroscopic synovial biopsy Right knee       Diagnosis: Right knee pain [M25.561]  Diagnosis Additional Information: No value filed.    Anesthesia Type:   General     Note:    Oropharynx: oropharynx clear of all foreign objects and spontaneously breathing  Level of Consciousness: awake  Oxygen Supplementation: room air    Independent Airway: airway patency satisfactory and stable  Dentition: dentition unchanged  Vital Signs Stable: post-procedure vital signs reviewed and stable  Report to RN Given: handoff report given  Patient transferred to: PACU    Handoff Report: Identifed the Patient, Identified the Reponsible Provider, Reviewed the pertinent medical history, Discussed the surgical course, Reviewed Intra-OP anesthesia mangement and issues during anesthesia, Set expectations for post-procedure period and Allowed opportunity for questions and acknowledgement of understanding      Vitals:  Vitals Value Taken Time   /78 02/18/22 1310   Temp     Pulse 87 02/18/22 1313   Resp     SpO2 98 % 02/18/22 1313   Vitals shown include unvalidated device data.    Electronically Signed By: EFRAIN Galan CRNA  February 18, 2022  1:15 PM

## 2022-02-18 NOTE — ANESTHESIA POSTPROCEDURE EVALUATION
Patient: Clau Wilder    Procedure: Procedure(s):  Athroscopic synovial biopsy Right knee       Diagnosis:Right knee pain [M25.561]  Diagnosis Additional Information: No value filed.    Anesthesia Type:  General    Note:  Disposition: Outpatient   Postop Pain Control: Uneventful            Sign Out: Well controlled pain   PONV:    Neuro/Psych: Uneventful            Sign Out: Acceptable/Baseline neuro status   Airway/Respiratory: Uneventful            Sign Out: Acceptable/Baseline resp. status   CV/Hemodynamics: Uneventful            Sign Out: Acceptable CV status; No obvious hypovolemia; No obvious fluid overload   Other NRE:    DID A NON-ROUTINE EVENT OCCUR?            Last vitals:  Vitals Value Taken Time   /84 02/18/22 1345   Temp 36.6  C (97.9  F) 02/18/22 1310   Pulse 97 02/18/22 1356   Resp 11 02/18/22 1356   SpO2 98 % 02/18/22 1356   Vitals shown include unvalidated device data.    Electronically Signed By: Brett Mccann MD  February 18, 2022  1:57 PM

## 2022-02-18 NOTE — ANESTHESIA PROCEDURE NOTES
Airway       Patient location during procedure: OR  Staff -        CRNA: Hussain Martinez APRN CRNA       Performed By: CRNA  Consent for Airway        Urgency: elective  Indications and Patient Condition       Indications for airway management: brody-procedural       Induction type:inhalational       Mask difficulty assessment: 1 - vent by mask    Final Airway Details       Final airway type: supraglottic airway    Supraglottic Airway Details        Type: LMA       Brand: Ambu AuraGain       LMA size: 4    Post intubation assessment        Placement verified by: capnometry, equal breath sounds and chest rise        Number of attempts at approach: 1       Number of other approaches attempted: 0       Secured with: silk tape       Ease of procedure: easy       Dentition: Intact and Unchanged

## 2022-02-18 NOTE — BRIEF OP NOTE
Orthopaedic Surgery Brief Op-Note      Patient: Clau Wilder  : 1985  Date of Service: 2022 1:25 PM    Pre-operative Diagnosis: Right knee pain [M25.561]  Post-operative Diagnosis: same    Procedure(s) Performed: Procedure(s):  Athroscopic synovial biopsy Right knee    Staff: Dr. Sanders  Assistants:   Uriah Jenkins PA-C    Anesthesia: General  EBL: <5 cc  UOP: see anesthesia record  Tourniquet Time: 35 minutes at 250 mmHg    Implants:   * No implants in log *  Drains: none  Intra-op Labs/Cxs/Specimens:   ID Type Source Tests Collected by Time Destination   1 : Right Knee Synovium Biopsy Biopsy Knee, Right SURGICAL PATHOLOGY EXAM Kelton Sanders MD 2022 12:47 PM      Complications: No apparent complications during procedure  Findings: Please see dictated operative note for details    Disposition: Stable to PACU, then discharge home today.     Post-Op Plan:  Assessment/Plan: Clau Wilder is a 36 year old female s/p Procedure(s):  Athroscopic synovial biopsy Right knee on 2022 with Dr. Sanders.    Activity: Up with assist and assistive devices as needed until independent.   Weight bearing status: WBAT   Antibiotics: Pre op Cefazolin  Diet: Begin with clear fluids and progress diet as tolerated. Bowel regimen. Anti-emetics PRN.    DVT prophylaxis: none  Elevation: right leg    Wound Care: steri's and ace wrap. Remove in 72 hours  Drains: none  Arizmendi: none  Pain management: Orals PRN, IV for breakthrough only  X-rays: none  Physical Therapy: none   Occupational Therapy: none   Labs: pathology on biopies   Cultures: none  Consults: none     Future Appointments   Date Time Provider Department Center   3/3/2022  3:30 PM Kelton Sanders MD [facility]       Disposition: Pending progress with pain control on orals, and medical stability, anticipate discharge to Home today.    I assisted with positioning, prepping and draping, and closure.      Uriah Jenkins PA-C  2022 1:25 PM  Physician  Assistant   Oncology and Adult Reconstructive Surgery  Dept Orthopaedic Surgery, [facility]     Thank you for allowing me to participate in this patient's care. Please page me directly any questions/concerns.   Securely message with the Vocera Web Console (learn more here)  Text page via [employer] Paging/Directory    If there is no response, if it is a weekend, or if it is during evening hours, please page the orthopaedic surgery resident on call via [employer] Paging/Directory

## 2022-02-19 NOTE — OP NOTE
Procedure Date: 02/18/2022    SURGEON:  Kelton Sanders MD    ASSISTANT:  Uriah Jenkins PA-C (a physician's assistant was present as there was no qualified resident available to assist with this patient's procedure).    PREOPERATIVE DIAGNOSIS:  Intraarticular masses with synovitis of right knee joint.    POSTOPERATIVE DIAGNOSIS:  Intraarticular masses with synovitis of right knee joint.    PROCEDURES PERFORMED:  1.  Diagnostic arthroscopic examination of right knee.  2.  Arthroscopic synovial biopsy.    ANESTHESIA:  General.    ESTIMATED BLOOD LOSS:  Less than 5 mL.    TOURNIQUET TIME:  335 minutes.    FINDINGS:  Large amount of exuberant hemorrhage with diffuse synovial proliferation and papillary fronds throughout the knee joint in the suprapatellar pouch as well as the medial and lateral gutters and extending surrounding the knee joint within the menisci.    DESCRIPTION OF PROCEDURE:  The patient was placed on the operating table in supine position.  After induction of general anesthesia, the right lower extremity was prepped and draped in a sterile manner.  The tourniquet was inflated to 250 mmHg after exsanguination.    A standard superomedial portal was now created for inflow of fluid.  After instilling the knee with joint fluid, an inferolateral portal was now created for a secondary camera placement.  The arthroscopic camera was placed in the inferolateral border, and a removable plastic cannula was placed within the superomedial portal for use for instrumentation.  Diagnostic arthroscopic examination of the knee joint was now performed.  The above findings were noted.  There is no evidence of any damage to the cruciate ligaments or to the medial or lateral menisci.  There is no articular surface damage.    I proceeded with the arthroscopic synovial biopsy procedure.  A Blakesley rongeur was now passed into the knee joint through the superomedial border, and multiple samples of the nodular areas with a  larger degree of papillary proliferation were now sampled.  Approximately 1 gram of tissue was obtained in total after multiple samples of the rongeur in the different areas of the knee joint with samples being taken under direct vision to ensure accuracy of tissue sampling.  In addition, I placed the Blakesley rongeur through the inferolateral portal while the camera was placed in the superomedial portal and obtained additional samples from the medial gutter.  Having completed this, all tissue samples were sent to the pathologist for examination.    The wound was now irrigated thoroughly of any remaining debris, and after flushing the knee cleanly, anesthetic cocktail was injected into the knee joint postoperatively.    Closing sutures with absorbable PDS were placed along the Steri-Strips and sterile dressings after deflation of the tourniquet.    Kelton Sanders MD        D: 2022   T: 2022   MT: oracio    Name:     CASEY MARROQUINNela  MRN:      [id number]        Account:        [id number]   :      1985           Procedure Date: 2022     Document: [id number]

## 2022-02-21 LAB
PATH REPORT.COMMENTS IMP SPEC: NORMAL
PATH REPORT.COMMENTS IMP SPEC: NORMAL
PATH REPORT.FINAL DX SPEC: NORMAL
PATH REPORT.GROSS SPEC: NORMAL
PATH REPORT.MICROSCOPIC SPEC OTHER STN: NORMAL
PHOTO IMAGE: NORMAL

## 2022-02-22 NOTE — RESULT ENCOUNTER NOTE
Dear Ms. 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  We can discussed this diagnosis, its implications and treatment options at your next virtual follow-up visit in March.      Kelton Sanders MD  2/21/2022  6:38 PM

## 2022-03-03 ENCOUNTER — VIRTUAL VISIT (OUTPATIENT)
Dept: ORTHOPEDICS | Facility: CLINIC | Age: 37
End: 2022-03-03
Payer: COMMERCIAL

## 2022-03-03 DIAGNOSIS — D48.19 TENOSYNOVIAL GIANT CELL TUMOR OF KNEE: Primary | ICD-10-CM

## 2022-03-03 PROCEDURE — 99213 OFFICE O/P EST LOW 20 MIN: CPT | Mod: 95 | Performed by: ORTHOPAEDIC SURGERY

## 2022-03-03 NOTE — LETTER
3/3/2022         RE: Clau Wilder  [address]  [place] [address]        Dear Colleague,    Thank you for referring your patient, Clau Wilder, to the [facility] [place]. Please see a copy of my visit note below.        [facility], Orthopaedic Oncology Surgery Consultation  by Kelton Sanders M.D.    Clau Wilder MRN# [id number]    YOB: 1985     Requesting physician: David Lovelace     Background history:  DX:  1. Right knee atraumatic effusion and pain, May 2021  2. TGCT R knee        TREATMENTS:  1. 12/9/21 MRI of right knee demonstrating Synovitis with multiple masslike areas of  2. synovial proliferation with associated susceptibility artifact.  Findings highly suspicious for PVNS.  3. 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    Therefore I recommended that we proceed with an open anterior and open posterior synovectomy. (Tier 3, 6 hours, overnight stay).  I reviewed the risks benefits and alternatives and the rationale for this recommendation with Clau.  We also mentioned the possible medical therapies in lieu of surgery however it is my recommendation that the medical therapies be reserved for locally recurrent T GCT should this occur.  We also reviewed the possibility of local recurrence.    We discussed the expected postoperative recovery consisting of usage of a walking boot plus minus crutches for 6 weeks and physiotherapy to restore the range of motion of the knee joint.    She has a good understanding and is indicated desire to proceed with surgery as outlined.  We will make the appropriate arrangements in the near future.      MD Richard Taylor Family Professor  Oncology and Adult Reconstructive Surgery  Dept Orthopaedic Surgery, [facility]  [facility]icians  [phone] office, [phone] pager  [email or web address]    Virtual-Visit Details    Type of service:  Video/telephone Visit  Video/telephone total duration (including visit time, pre and post visit work time as documented above on the same day of service): 20    Visit start time: 1550  Visit end time:1610  Originating Location (pt. Location): Home  Distant Location (provider location):  [facility]   Platform used for Virtual Visit: [employer]

## 2022-03-03 NOTE — PROGRESS NOTES
[facility], Orthopaedic Oncology Surgery Consultation  by Kelton Sanders M.D.    Clau Wilder MRN# [id number]    YOB: 1985     Requesting physician: David Lovelace     Background history:  DX:  1. Right knee atraumatic effusion and pain, May 2021  2. TGCT R knee        TREATMENTS:  1. 12/9/21 MRI of right knee demonstrating Synovitis with multiple masslike areas of  2. synovial proliferation with associated susceptibility artifact.  Findings highly suspicious for PVNS.  3. 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    Therefore I recommended that we proceed with an open anterior and open posterior synovectomy. (Tier 3, 6 hours, overnight stay).  I reviewed the risks benefits and alternatives and the rationale for this recommendation with Clau.  We also mentioned the possible medical therapies in lieu of surgery however it is my recommendation that the medical therapies be reserved for locally recurrent T GCT should this occur.  We also reviewed the possibility of local recurrence.    We discussed the expected postoperative recovery consisting of usage of a walking boot plus minus crutches for 6 weeks and physiotherapy to restore the range of motion of the knee joint.    She has a good understanding and is indicated desire to proceed with surgery as outlined.  We will make the appropriate arrangements in the near future.      MD Richard Taylor Family Professor  Oncology and Adult Reconstructive Surgery  Dept Orthopaedic Surgery, [facility]  [phone] office, [phone] pager  [email or web address]    Virtual-Visit Details    Type of service:  Video/telephone Visit  Video/telephone total duration (including visit time, pre and post visit work time as documented above on the same day of service): 20     Visit start time: 1550  Visit end time:1610  Originating Location (pt. Location): Home  Distant Location (provider location):  [facility] ORTHOPED[facility]   Platform used for Virtual Visit: Kerlink

## 2022-03-04 ENCOUNTER — TELEPHONE (OUTPATIENT)
Dept: ORTHOPEDICS | Facility: CLINIC | Age: 37
End: 2022-03-04
Payer: COMMERCIAL

## 2022-03-04 ENCOUNTER — PREP FOR PROCEDURE (OUTPATIENT)
Dept: ORTHOPEDICS | Facility: CLINIC | Age: 37
End: 2022-03-04
Payer: COMMERCIAL

## 2022-03-04 DIAGNOSIS — D48.19 TENOSYNOVIAL GIANT CELL TUMOR OF KNEE: ICD-10-CM

## 2022-03-04 DIAGNOSIS — M12.20 PVNS (PIGMENTED VILLONODULAR SYNOVITIS): Primary | ICD-10-CM

## 2022-03-04 NOTE — TELEPHONE ENCOUNTER
Message left for Clau to call this RN back to discuss scheduling of surgery and coordinating appointments.    MARIO DoN, RN  RN Care Coordinator, Dr. Tommy BENSON [facility] Orthopedic [facility]

## 2022-03-04 NOTE — TELEPHONE ENCOUNTER
Returned call to Clau after message left.  Surgical date of 3/24 confirmed. Scheduled COVID test.  Clau's dad will be her caregiver following surgery and present day of surgery.   Clau has crutches, minimal stairs once in home. Aware of potential activity restrictions, potential boot.  Reviewed COVID hospital restrictions.  Clau has had previous surgeries, tolerated anesthesia well.  Will do preop at [place].  Denies cardiac history, asthma that is exercise induced.Hx of kidney stones  Clau has her surgical packet from previous procedure. Aware of location of hospital. Reviewed NPO and showering, Clau will purchase antibacterial soap.  Reviewed medications.        Teaching Flowsheet   Relevant Diagnosis: open anterior and open posterior synovectomy  Teaching Topic: Preop Teaching for above     Person(s) involved in teaching:   Patient     Motivation Level:  Asks Questions: Yes  Eager to Learn: Yes  Cooperative: Yes  Receptive (willing/able to accept information): Yes  Any cultural factors/Episcopal beliefs that may influence understanding or compliance? No       Patient demonstrates understanding of the following:  Reason for the appointment, diagnosis and treatment plan: Yes  Knowledge of proper use of medications and conditions for which they are ordered (with special attention to potential side effects or drug interactions): Yes  Which situations necessitate calling provider and whom to contact: Yes       Teaching Concerns Addressed: see above  Stoplight Tool discussed:  yes  Patient verbalized understanding:yes       Proper use and care of crtuches/boot  Nutritional needs and diet plan: Yes  Pain management techniques: Yes  Wound Care: Yes  How and/when to access community resources: NA     Instructional Materials Used/Given: Preop Packet and Antiseptic Soap       Time spent with patient: 30 minutes.    MARIO DoN, RN  RN Care Coordinator, Dr. Tommy BENSON [facility]

## 2022-03-07 DIAGNOSIS — M12.20 PVNS (PIGMENTED VILLONODULAR SYNOVITIS): Primary | ICD-10-CM

## 2022-03-07 DIAGNOSIS — Z11.59 ENCOUNTER FOR SCREENING FOR OTHER VIRAL DISEASES: Primary | ICD-10-CM

## 2022-03-07 NOTE — PROGRESS NOTES
Brief: Ancef, Supine then prone, Permanent specimen    Plan:     1) Supine position, Anterior Synovectomy  2) Prone position, Posterior Synovercomy    Background:   DX:  1. Right knee atraumatic effusion and pain, May 2021  2. TGCT R knee     TREATMENTS:  1. 12/9/21 MRI of right knee demonstrating Synovitis with multiple masslike areas of  2. synovial proliferation with associated susceptibility artifact.  Findings highly suspicious for PVNS.  3. 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     Therefore I recommended that we proceed with an open anterior and open posterior synovectomy. (Tier 3, 6 hours, overnight stay).  I reviewed the risks benefits and alternatives and the rationale for this recommendation with Clau.  We also mentioned the possible medical therapies in lieu of surgery however it is my recommendation that the medical therapies be reserved for locally recurrent T GCT should this occur.  We also reviewed the possibility of local recurrence.     We discussed the expected postoperative recovery consisting of usage of a walking boot plus minus crutches for 6 weeks and physiotherapy to restore the range of motion of the knee joint.     Patient Position 1 (Anterior Approach) (indicated by x):   x  Supine, Bump under hip, proximal thigh tourniquet, prepped like total knee   x  Sandbag taped at the mid-thigh position    x  Black padded paddle circular proximal thigh positioner   x   Extremity drape   x  Regular OR table      Patient Position 2 (Posterior Approach) (indicated by x):  x   Prone on blanket rolls/round gel pad   x   Split drape with top bar     General Equipment Requests (indicated by x):     x  Major Ortho    x  Retractors: Winston Luevano, Alban, Vein    x  Vessel Loops (2)   x  Red and Blue Clips    x  2.0 & 3.0 Silk  Ties (Free for passing and also stick ties)    x  Extra clamps for passing ties    x  K[place]ner   x   Gel Foam [Backup]    x  Surgiseal [Backup]    x  Pituitary ronguer    x  Pistol  pituitary      Specimens and cultures (indicated by x):      Tissue cultures, aerobic and anaerobic without gram stain     Frozen section     pathology specimens - fresh    x  pathology specimens - formalin          Lemuel Negrete DO  Adult Reconstruction Fellow  Dept Orthopaedic Surgery, [facility]  [phone] Pager [phone] Office

## 2022-03-10 ENCOUNTER — DOCUMENTATION ONLY (OUTPATIENT)
Dept: ORTHOPEDICS | Facility: CLINIC | Age: 37
End: 2022-03-10
Payer: COMMERCIAL

## 2022-03-10 NOTE — PROGRESS NOTES
FMLA and disability forms complete and faxed to Antonella Rich [phone]  Sent to be scanned into chart    Brenda Carlson

## 2022-03-14 ENCOUNTER — OFFICE VISIT (OUTPATIENT)
Dept: FAMILY MEDICINE | Facility: CLINIC | Age: 37
End: 2022-03-14
Payer: COMMERCIAL

## 2022-03-14 VITALS
HEART RATE: 79 BPM | WEIGHT: 168.7 LBS | TEMPERATURE: 97.4 F | RESPIRATION RATE: 12 BRPM | BODY MASS INDEX: 28.07 KG/M2 | OXYGEN SATURATION: 99 % | DIASTOLIC BLOOD PRESSURE: 80 MMHG | SYSTOLIC BLOOD PRESSURE: 110 MMHG

## 2022-03-14 DIAGNOSIS — D48.19 TENOSYNOVIAL GIANT CELL TUMOR OF KNEE: ICD-10-CM

## 2022-03-14 DIAGNOSIS — Z01.818 PRE-OP EXAM: Primary | ICD-10-CM

## 2022-03-14 PROCEDURE — 99214 OFFICE O/P EST MOD 30 MIN: CPT | Performed by: FAMILY MEDICINE

## 2022-03-14 ASSESSMENT — PAIN SCALES - GENERAL: PAINLEVEL: MILD PAIN (3)

## 2022-03-14 NOTE — H&P (VIEW-ONLY)
[facility]  [address]  [facility] [address]  Phone: [phone]  Primary Provider: No Ref-Primary, Physician    PREOPERATIVE EVALUATION:  Today's date: 3/14/2022    Clau Wilder is a 37 year old female who presents for a preoperative evaluation.    Surgical Information:  Surgery/Procedure: Open anterior synovectomy Right knee, Open posterior synovectomy Right knee  Surgery Location: [facility]  Surgeon: Kelton Sanders MD  Surgery Date: 3/24/2022  Time of Surgery: 9:45 AM  Where patient plans to recover: At home with family  Fax number for surgical facility: Note does not need to be faxed, will be available electronically in Epic.    Type of Anesthesia Anticipated: General    Assessment & Plan     The proposed surgical procedure is considered INTERMEDIATE risk.    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     RECOMMENDATION:  APPROVAL GIVEN to proceed with proposed procedure, without further diagnostic evaluation.      Subjective     HPI related to upcoming procedure:  Planning to take time off of work to recover. Feeling well No additional concerns.       Preop Questions 3/14/2022   1. Have you ever had a heart attack or stroke? No   2. Have you ever had surgery on your heart or blood vessels, such as a stent placement, a coronary artery bypass, or surgery on an artery in your head, neck, heart, or legs? No   3. Do you have chest pain with activity? No   4. Do you have a history of  heart failure? No   5. Do you currently have a cold, bronchitis or symptoms of other infection? No   6. Do you have a cough, shortness of breath, or  wheezing? No   7. Do you or anyone in your family have previous history of blood clots? No   8. Do you or does anyone in your family have a serious bleeding problem such as prolonged bleeding following surgeries or cuts? No   9. Have you ever had problems with anemia or been told to take iron pills? No   10. Have you had any abnormal blood loss such as black, tarry or bloody stools, or abnormal vaginal bleeding? No   11. Have you ever had a blood transfusion? No   12. Are you willing to have a blood transfusion if it is medically needed before, during, or after your surgery? Yes   13. Have you or any of your relatives ever had problems with anesthesia? No   14. Do you have sleep apnea, excessive snoring or daytime drowsiness? No   15. Do you have any artifical heart valves or other implanted medical devices like a pacemaker, defibrillator, or continuous glucose monitor? No   16. Do you have artificial joints? No   17. Are you allergic to latex? No   18. Is there any chance that you may be pregnant? No       Health Care Directive:  Patient does not have a Health Care Directive or Living Will: Patient states has Advance Directive and will bring in a copy to clinic.    Preoperative Review of :   reviewed - no record of controlled substances prescribed.        Review of Systems  Constitutional, neuro, ENT, endocrine, pulmonary, cardiac, gastrointestinal, genitourinary, musculoskeletal, integument and psychiatric systems are negative, except as otherwise noted.    Patient Active Problem List    Diagnosis Date Noted     Chronic pain of right knee 01/10/2022     Priority: Medium     Endometrium, polyp 09/08/2016     Priority: Medium     Excessive bleeding in premenopausal period 09/08/2016     Priority: Medium      Past Medical History:   Diagnosis Date     Calculi, ureter      Depressive disorder      IUD migration     perforation     Mental disorder      Past Surgical History:   Procedure Laterality Date      ARTHROSCOPY KNEE Right 2022    Procedure: Athroscopic synovial biopsy Right knee;  Surgeon: Kelton Sanders MD;  [facility]: [facility]      SECTION       [id number] with stone removal       HYSTERECTOMY VAGINAL, BILATERAL SALPINGO-OOPHERECTOMY, COMBINED       HYSTEROSCOPY DIAGNOSTIC  2016     LAPAROSCOPY OPERATIVE ADULT  2016    removal of ectopic IUD     ORTHOPEDIC SURGERY      tendon repair ankle     NM HAND/FINGER SURGERY UNLISTED  10/06/2021     NM STOMACH SURGERY PROCEDURE UNLISTED      , IUD retrieval     Current Outpatient Medications   Medication Sig Dispense Refill     FLUoxetine 20 MG tablet Take 20 mg by mouth every morning        lamoTRIgine (LAMICTAL) 200 MG tablet Take 300 mg by mouth At Bedtime   0     multivitamin w/minerals (MULTI-VITAMIN) tablet Take 1 tablet by mouth every morning       acetaminophen (TYLENOL) 325 MG tablet Take 325-650 mg by mouth every 6 hours as needed for mild pain       ibuprofen (ADVIL/MOTRIN) 200 MG tablet Take 200 mg by mouth every 4 hours as needed for mild pain       lamoTRIgine (LAMICTAL) 150 MG tablet Take 150 mg by mouth         Allergies   Allergen Reactions     Erythromycin Nausea and Vomiting        Social History     Tobacco Use     Smoking status: Never Smoker     Smokeless tobacco: Never Used   Substance Use Topics     Alcohol use: Yes     Comment: occ.     Family History   Problem Relation Age of Onset     Thyroid Cancer Mother      Other Cancer Mother         Thyroid cancer     Lupus Maternal Grandmother      Aneurysm Paternal Grandfather      Anesthesia Reaction No family hx of      Deep Vein Thrombosis (DVT) No family hx of      History   Drug Use No         Objective     /80 (BP Location: Right arm, Patient Position: Chair, Cuff Size: Adult Regular)   Pulse 79   Temp 97.4  F (36.3  C) (Tympanic)   Resp 12   Wt 76.5 kg (168 lb 11.2 oz)   LMP 2016   SpO2 99%   BMI 28.07 kg/m      Physical  Exam  Constitutional:       General: She is not in acute distress.     Appearance: She is well-developed.   HENT:      Right Ear: Tympanic membrane and external ear normal.      Left Ear: Tympanic membrane and external ear normal.      Nose: Nose normal.      Mouth/Throat:      Pharynx: No oropharyngeal exudate.   Eyes:      General:         Right eye: No discharge.         Left eye: No discharge.      Conjunctiva/sclera: Conjunctivae normal.      Pupils: Pupils are equal, round, and reactive to light.   Neck:      Thyroid: No thyromegaly.      Trachea: No tracheal deviation.   Cardiovascular:      Rate and Rhythm: Normal rate and regular rhythm.      Pulses: Normal pulses.      Heart sounds: Normal heart sounds, S1 normal and S2 normal. No murmur heard.    No friction rub. No S3 or S4 sounds.   Pulmonary:      Effort: Pulmonary effort is normal. No respiratory distress.      Breath sounds: Normal breath sounds. No wheezing or rales.   Abdominal:      General: Bowel sounds are normal.      Palpations: Abdomen is soft. There is no mass.      Tenderness: There is no abdominal tenderness.   Musculoskeletal:         General: Normal range of motion.      Cervical back: Neck supple.   Lymphadenopathy:      Cervical: No cervical adenopathy.   Skin:     General: Skin is warm and dry.      Findings: No rash.   Neurological:      Mental Status: She is alert and oriented to person, place, and time.      Motor: No abnormal muscle tone.      Deep Tendon Reflexes: Reflexes are normal and symmetric.   Psychiatric:         Thought Content: Thought content normal.         Judgment: Judgment normal.         No results for input(s): HGB, PLT, INR, NA, POTASSIUM, CR, A1C in the last 64110 hours.     Diagnostics:  No labs were ordered during this visit.   No EKG required, no history of coronary heart disease, significant arrhythmia, peripheral arterial disease or other structural heart disease.    Revised Cardiac Risk Index (RCRI):  The  patient has the following serious cardiovascular risks for perioperative complications:   - No serious cardiac risks = 0 points     RCRI Interpretation: 0 points: Class I (very low risk - 0.4% complication rate)           Signed Electronically by: ADRIAN MATUTE DO  Copy of this evaluation report is provided to requesting physician.

## 2022-03-14 NOTE — PROGRESS NOTES
[facility]  [address]  [place] [address]  Phone: [phone]  Primary Provider: No Ref-Primary, Physician    PREOPERATIVE EVALUATION:  Today's date: 3/14/2022    Clau Wilder is a 37 year old female who presents for a preoperative evaluation.    Surgical Information:  Surgery/Procedure: Open anterior synovectomy Right knee, Open posterior synovectomy Right knee  Surgery Location: [facility]  Surgeon: Kelton Sanders MD  Surgery Date: 3/24/2022  Time of Surgery: 9:45 AM  Where patient plans to recover: At home with family  Fax number for surgical facility: Note does not need to be faxed, will be available electronically in Epic.    Type of Anesthesia Anticipated: General    Assessment & Plan     The proposed surgical procedure is considered INTERMEDIATE risk.    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     RECOMMENDATION:  APPROVAL GIVEN to proceed with proposed procedure, without further diagnostic evaluation.      Subjective     HPI related to upcoming procedure:  Planning to take time off of work to recover. Feeling well No additional concerns.       Preop Questions 3/14/2022   1. Have you ever had a heart attack or stroke? No   2. Have you ever had surgery on your heart or blood vessels, such as a stent placement, a coronary artery bypass, or surgery on an artery in your head, neck, heart, or legs? No   3. Do you have chest pain with activity? No   4. Do you have a history of  heart failure? No   5. Do you currently have a cold, bronchitis or symptoms of other infection? No   6. Do you have a cough, shortness of breath, or  wheezing? No   7. Do you or anyone in your family have previous history of blood clots? No   8. Do you or does anyone in your family have a serious bleeding problem such as prolonged bleeding following surgeries or cuts? No   9. Have you ever had problems with anemia or been told to take iron pills? No   10. Have you had any abnormal blood loss such as black, tarry or bloody stools, or abnormal vaginal bleeding? No   11. Have you ever had a blood transfusion? No   12. Are you willing to have a blood transfusion if it is medically needed before, during, or after your surgery? Yes   13. Have you or any of your relatives ever had problems with anesthesia? No   14. Do you have sleep apnea, excessive snoring or daytime drowsiness? No   15. Do you have any artifical heart valves or other implanted medical devices like a pacemaker, defibrillator, or continuous glucose monitor? No   16. Do you have artificial joints? No   17. Are you allergic to latex? No   18. Is there any chance that you may be pregnant? No       Health Care Directive:  Patient does not have a Health Care Directive or Living Will: Patient states has Advance Directive and will bring in a copy to clinic.    Preoperative Review of :   reviewed - no record of controlled substances prescribed.        Review of Systems  Constitutional, neuro, ENT, endocrine, pulmonary, cardiac, gastrointestinal, genitourinary, musculoskeletal, integument and psychiatric systems are negative, except as otherwise noted.    Patient Active Problem List    Diagnosis Date Noted     Chronic pain of right knee 01/10/2022     Priority: Medium     Endometrium, polyp 09/08/2016     Priority: Medium     Excessive bleeding in premenopausal period 09/08/2016     Priority: Medium      Past Medical History:   Diagnosis Date     Calculi, ureter      Depressive disorder      IUD migration     perforation     Mental disorder      Past Surgical History:   Procedure Laterality Date      ARTHROSCOPY KNEE Right 2022    Procedure: Athroscopic synovial biopsy Right knee;  Surgeon: Kelton Sanders MD;  [facility]      SECTION       [id number] with stone removal       HYSTERECTOMY VAGINAL, BILATERAL SALPINGO-OOPHERECTOMY, COMBINED       HYSTEROSCOPY DIAGNOSTIC  2016     LAPAROSCOPY OPERATIVE ADULT  2016    removal of ectopic IUD     ORTHOPEDIC SURGERY      tendon repair ankle     ME HAND/FINGER SURGERY UNLISTED  10/06/2021     ME STOMACH SURGERY PROCEDURE UNLISTED      , IUD retrieval     Current Outpatient Medications   Medication Sig Dispense Refill     FLUoxetine 20 MG tablet Take 20 mg by mouth every morning        lamoTRIgine (LAMICTAL) 200 MG tablet Take 300 mg by mouth At Bedtime   0     multivitamin w/minerals (MULTI-VITAMIN) tablet Take 1 tablet by mouth every morning       acetaminophen (TYLENOL) 325 MG tablet Take 325-650 mg by mouth every 6 hours as needed for mild pain       ibuprofen (ADVIL/MOTRIN) 200 MG tablet Take 200 mg by mouth every 4 hours as needed for mild pain       lamoTRIgine (LAMICTAL) 150 MG tablet Take 150 mg by mouth         Allergies   Allergen Reactions     Erythromycin Nausea and Vomiting        Social History     Tobacco Use     Smoking status: Never Smoker     Smokeless tobacco: Never Used   Substance Use Topics     Alcohol use: Yes     Comment: occ.     Family History   Problem Relation Age of Onset     Thyroid Cancer Mother      Other Cancer Mother         Thyroid cancer     Lupus Maternal Grandmother      Aneurysm Paternal Grandfather      Anesthesia Reaction No family hx of      Deep Vein Thrombosis (DVT) No family hx of      History   Drug Use No         Objective     /80 (BP Location: Right arm, Patient Position: Chair, Cuff Size: Adult Regular)   Pulse 79   Temp 97.4  F (36.3  C) (Tympanic)   Resp 12   Wt 76.5 kg (168 lb 11.2 oz)   LMP 2016   SpO2 99%   BMI 28.07 kg/m      Physical  Exam  Constitutional:       General: She is not in acute distress.     Appearance: She is well-developed.   HENT:      Right Ear: Tympanic membrane and external ear normal.      Left Ear: Tympanic membrane and external ear normal.      Nose: Nose normal.      Mouth/Throat:      Pharynx: No oropharyngeal exudate.   Eyes:      General:         Right eye: No discharge.         Left eye: No discharge.      Conjunctiva/sclera: Conjunctivae normal.      Pupils: Pupils are equal, round, and reactive to light.   Neck:      Thyroid: No thyromegaly.      Trachea: No tracheal deviation.   Cardiovascular:      Rate and Rhythm: Normal rate and regular rhythm.      Pulses: Normal pulses.      Heart sounds: Normal heart sounds, S1 normal and S2 normal. No murmur heard.    No friction rub. No S3 or S4 sounds.   Pulmonary:      Effort: Pulmonary effort is normal. No respiratory distress.      Breath sounds: Normal breath sounds. No wheezing or rales.   Abdominal:      General: Bowel sounds are normal.      Palpations: Abdomen is soft. There is no mass.      Tenderness: There is no abdominal tenderness.   Musculoskeletal:         General: Normal range of motion.      Cervical back: Neck supple.   Lymphadenopathy:      Cervical: No cervical adenopathy.   Skin:     General: Skin is warm and dry.      Findings: No rash.   Neurological:      Mental Status: She is alert and oriented to person, place, and time.      Motor: No abnormal muscle tone.      Deep Tendon Reflexes: Reflexes are normal and symmetric.   Psychiatric:         Thought Content: Thought content normal.         Judgment: Judgment normal.         No results for input(s): HGB, PLT, INR, NA, POTASSIUM, CR, A1C in the last 66930 hours.     Diagnostics:  No labs were ordered during this visit.   No EKG required, no history of coronary heart disease, significant arrhythmia, peripheral arterial disease or other structural heart disease.    Revised Cardiac Risk Index (RCRI):  The  patient has the following serious cardiovascular risks for perioperative complications:   - No serious cardiac risks = 0 points     RCRI Interpretation: 0 points: Class I (very low risk - 0.4% complication rate)           Signed Electronically by: ADRIAN MATUTE DO  Copy of this evaluation report is provided to requesting physician.

## 2022-03-21 ENCOUNTER — ANESTHESIA EVENT (OUTPATIENT)
Dept: SURGERY | Facility: CLINIC | Age: 37
End: 2022-03-21
Payer: COMMERCIAL

## 2022-03-22 ENCOUNTER — LAB (OUTPATIENT)
Dept: LAB | Facility: CLINIC | Age: 37
End: 2022-03-22
Payer: COMMERCIAL

## 2022-03-22 DIAGNOSIS — Z11.59 NEED FOR HEPATITIS C SCREENING TEST: ICD-10-CM

## 2022-03-22 DIAGNOSIS — Z11.4 SCREENING FOR HIV (HUMAN IMMUNODEFICIENCY VIRUS): ICD-10-CM

## 2022-03-22 DIAGNOSIS — Z11.59 ENCOUNTER FOR SCREENING FOR OTHER VIRAL DISEASES: ICD-10-CM

## 2022-03-22 PROCEDURE — U0003 INFECTIOUS AGENT DETECTION BY NUCLEIC ACID (DNA OR RNA); SEVERE ACUTE RESPIRATORY SYNDROME CORONAVIRUS 2 (SARS-COV-2) (CORONAVIRUS DISEASE [COVID-19]), AMPLIFIED PROBE TECHNIQUE, MAKING USE OF HIGH THROUGHPUT TECHNOLOGIES AS DESCRIBED BY CMS-2020-01-R: HCPCS

## 2022-03-22 PROCEDURE — U0005 INFEC AGEN DETEC AMPLI PROBE: HCPCS

## 2022-03-23 LAB — SARS-COV-2 RNA RESP QL NAA+PROBE: NEGATIVE

## 2022-03-23 NOTE — ANESTHESIA PREPROCEDURE EVALUATION
Anesthesia Pre-Procedure Evaluation    Patient: Clau Wilder   MRN: [id number] : 1985        Procedure : Procedure(s):  Open anterior synovectomy Right knee  Open posterior synovectomy Right knee          Past Medical History:   Diagnosis Date     Calculi, ureter      Depressive disorder      IUD migration     perforation     Mental disorder       Past Surgical History:   Procedure Laterality Date     ARTHROSCOPY KNEE Right 2022    Procedure: Athroscopic synovial biopsy Right knee;  Surgeon: Kelton Sanders MD;  Location: [facility]      SECTION       [id number] with stone removal       HYSTERECTOMY VAGINAL, BILATERAL SALPINGO-OOPHERECTOMY, COMBINED       HYSTEROSCOPY DIAGNOSTIC  2016     LAPAROSCOPY OPERATIVE ADULT  2016    removal of ectopic IUD     ORTHOPEDIC SURGERY      tendon repair ankle     [place] HAND/FINGER SURGERY UNLISTED  10/06/2021     [place] STOMACH SURGERY PROCEDURE UNLISTED      , IUD retrieval      Allergies   Allergen Reactions     Erythromycin Nausea and Vomiting      Social History     Tobacco Use     Smoking status: Never Smoker     Smokeless tobacco: Never Used   Substance Use Topics     Alcohol use: Yes     Comment: occ.      Wt Readings from Last 1 Encounters:   22 76.5 kg (168 lb 11.2 oz)        Anesthesia Evaluation   Pt has had prior anesthetic.     No history of anesthetic complications       ROS/MED HX  ENT/Pulmonary:  - neg pulmonary ROS     Neurologic: Comment: Hx concussion 2022      Cardiovascular:  - neg cardiovascular ROS   (+) -----No previous cardiac testing     METS/Exercise Tolerance:     Hematologic:     (+) anemia,     Musculoskeletal:       GI/Hepatic:  - neg GI/hepatic ROS     Renal/Genitourinary:  - neg Renal ROS     Endo:  - neg endo ROS     Psychiatric/Substance Use:     (+) psychiatric history depression  (-) chronic opioid use history   Infectious Disease: Comment: COVID negative 3/22/22      Malignancy:   (+)  Malignancy, History of Other.Other CA Tenosynovial giant cell tumor of right knee Active status post Surgery.    Other:      (+) , H/O Chronic Pain (Right knee),        Physical Exam    Airway        Mallampati: II   TM distance: > 3 FB   Neck ROM: full   Mouth opening: > 3 cm    Respiratory Devices and Support         Dental  no notable dental history         Cardiovascular   cardiovascular exam normal       Rhythm and rate: regular and normal     Pulmonary   pulmonary exam normal        breath sounds clear to auscultation           OUTSIDE LABS:  CBC: No results found for: WBC, HGB, HCT, PLT  BMP:   Lab Results   Component Value Date    GLC 88 02/18/2022     COAGS: No results found for: PTT, INR, FIBR  POC:   Lab Results   Component Value Date    HCG Negative 01/15/2007     HEPATIC: No results found for: ALBUMIN, PROTTOTAL, ALT, AST, GGT, ALKPHOS, BILITOTAL, BILIDIRECT, KEIRY  OTHER: No results found for: PH, LACT, A1C, JORGE, PHOS, MAG, LIPASE, AMYLASE, TSH, T4, T3, CRP, SED    Anesthesia Plan    ASA Status:  3   NPO Status:  NPO Appropriate    Anesthesia Type: General.     - Airway: ETT   Induction: Intravenous, Propofol.   Maintenance: Balanced.   Techniques and Equipment:     - Lines/Monitors: 2nd IV     Consents         - Extended Intubation/Ventilatory Support Discussed: No.      - Patient is DNR/DNI Status: No    Use of blood products discussed: No .     Postoperative Care    Pain management: IV analgesics, Oral pain medications, Multi-modal analgesia.   PONV prophylaxis: Ondansetron (or other 5HT-3), Dexamethasone or Solumedrol     Comments:                Sal Garcia MD

## 2022-03-24 ENCOUNTER — HOSPITAL ENCOUNTER (OUTPATIENT)
Facility: CLINIC | Age: 37
LOS: 1 days | Discharge: HOME | End: 2022-03-25
Attending: ORTHOPAEDIC SURGERY | Admitting: ORTHOPAEDIC SURGERY
Payer: COMMERCIAL

## 2022-03-24 ENCOUNTER — ANESTHESIA (OUTPATIENT)
Dept: SURGERY | Facility: CLINIC | Age: 37
End: 2022-03-24
Payer: COMMERCIAL

## 2022-03-24 DIAGNOSIS — M12.20 PVNS (PIGMENTED VILLONODULAR SYNOVITIS): Primary | ICD-10-CM

## 2022-03-24 DIAGNOSIS — Z98.890 S/P RIGHT KNEE SURGERY: Primary | ICD-10-CM

## 2022-03-24 LAB
ABO/RH(D): NORMAL
ABO/RH(D): NORMAL
ANTIBODY SCREEN: NEGATIVE
ERYTHROCYTE [DISTWIDTH] IN BLOOD BY AUTOMATED COUNT: 12.4 % (ref 10–15)
GLUCOSE BLDC GLUCOMTR-MCNC: 97 MG/DL (ref 70–99)
HCT VFR BLD AUTO: 36.4 % (ref 35–47)
HGB BLD-MCNC: 12.1 G/DL (ref 11.7–15.7)
HOLD SPECIMEN: NORMAL
MCH RBC QN AUTO: 30.1 PG (ref 26.5–33)
MCHC RBC AUTO-ENTMCNC: 33.2 G/DL (ref 31.5–36.5)
MCV RBC AUTO: 91 FL (ref 78–100)
PLATELET # BLD AUTO: 218 10E3/UL (ref 150–450)
RBC # BLD AUTO: 4.02 10E6/UL (ref 3.8–5.2)
SPECIMEN EXPIRATION DATE: NORMAL
SPECIMEN EXPIRATION DATE: NORMAL
WBC # BLD AUTO: 5.5 10E3/UL (ref 4–11)

## 2022-03-24 PROCEDURE — 99214 OFFICE O/P EST MOD 30 MIN: CPT | Performed by: INTERNAL MEDICINE

## 2022-03-24 PROCEDURE — 250N000013 HC RX MED GY IP 250 OP 250 PS 637: Performed by: PHYSICIAN ASSISTANT

## 2022-03-24 PROCEDURE — 258N000003 HC RX IP 258 OP 636

## 2022-03-24 PROCEDURE — 250N000013 HC RX MED GY IP 250 OP 250 PS 637

## 2022-03-24 PROCEDURE — 250N000011 HC RX IP 250 OP 636: Performed by: ANESTHESIOLOGY

## 2022-03-24 PROCEDURE — 250N000011 HC RX IP 250 OP 636

## 2022-03-24 PROCEDURE — 272N000001 HC OR GENERAL SUPPLY STERILE: Performed by: ORTHOPAEDIC SURGERY

## 2022-03-24 PROCEDURE — 250N000009 HC RX 250: Performed by: NURSE ANESTHETIST, CERTIFIED REGISTERED

## 2022-03-24 PROCEDURE — 88305 TISSUE EXAM BY PATHOLOGIST: CPT | Mod: 26 | Performed by: PATHOLOGY

## 2022-03-24 PROCEDURE — 258N000003 HC RX IP 258 OP 636: Performed by: PHYSICIAN ASSISTANT

## 2022-03-24 PROCEDURE — 85027 COMPLETE CBC AUTOMATED: CPT

## 2022-03-24 PROCEDURE — 250N000011 HC RX IP 250 OP 636: Performed by: NURSE ANESTHETIST, CERTIFIED REGISTERED

## 2022-03-24 PROCEDURE — 360N000076 HC SURGERY LEVEL 3, PER MIN: Performed by: ORTHOPAEDIC SURGERY

## 2022-03-24 PROCEDURE — 250N000025 HC SEVOFLURANE, PER MIN: Performed by: ORTHOPAEDIC SURGERY

## 2022-03-24 PROCEDURE — 370N000017 HC ANESTHESIA TECHNICAL FEE, PER MIN: Performed by: ORTHOPAEDIC SURGERY

## 2022-03-24 PROCEDURE — 64708 REVISE ARM/LEG NERVE: CPT | Mod: 58 | Performed by: ORTHOPAEDIC SURGERY

## 2022-03-24 PROCEDURE — 27335 REMOVE KNEE JOINT LINING: CPT | Mod: 58 | Performed by: ORTHOPAEDIC SURGERY

## 2022-03-24 PROCEDURE — 36415 COLL VENOUS BLD VENIPUNCTURE: CPT

## 2022-03-24 PROCEDURE — 88305 TISSUE EXAM BY PATHOLOGIST: CPT | Mod: TC | Performed by: ORTHOPAEDIC SURGERY

## 2022-03-24 PROCEDURE — 250N000011 HC RX IP 250 OP 636: Performed by: PHYSICIAN ASSISTANT

## 2022-03-24 PROCEDURE — 250N000009 HC RX 250

## 2022-03-24 PROCEDURE — 710N000010 HC RECOVERY PHASE 1, LEVEL 2, PER MIN: Performed by: ORTHOPAEDIC SURGERY

## 2022-03-24 PROCEDURE — 86901 BLOOD TYPING SEROLOGIC RH(D): CPT | Performed by: ANESTHESIOLOGY

## 2022-03-24 PROCEDURE — 35703 EXPL N/FLWD SURG LXTR ART: CPT | Mod: 58 | Performed by: ORTHOPAEDIC SURGERY

## 2022-03-24 PROCEDURE — 86850 RBC ANTIBODY SCREEN: CPT

## 2022-03-24 PROCEDURE — 999N000141 HC STATISTIC PRE-PROCEDURE NURSING ASSESSMENT: Performed by: ORTHOPAEDIC SURGERY

## 2022-03-24 PROCEDURE — 250N000009 HC RX 250: Performed by: ANESTHESIOLOGY

## 2022-03-24 RX ORDER — AMOXICILLIN 250 MG
1 CAPSULE ORAL 2 TIMES DAILY
Status: DISCONTINUED | OUTPATIENT
Start: 2022-03-24 | End: 2022-03-25 | Stop reason: HOSPADM

## 2022-03-24 RX ORDER — DEXMEDETOMIDINE HYDROCHLORIDE 4 UG/ML
INJECTION, SOLUTION INTRAVENOUS
Status: COMPLETED | OUTPATIENT
Start: 2022-03-24 | End: 2022-03-24

## 2022-03-24 RX ORDER — ONDANSETRON 2 MG/ML
4 INJECTION INTRAMUSCULAR; INTRAVENOUS EVERY 6 HOURS PRN
Status: DISCONTINUED | OUTPATIENT
Start: 2022-03-24 | End: 2022-03-25 | Stop reason: HOSPADM

## 2022-03-24 RX ORDER — DEXAMETHASONE SODIUM PHOSPHATE 4 MG/ML
INJECTION, SOLUTION INTRA-ARTICULAR; INTRALESIONAL; INTRAMUSCULAR; INTRAVENOUS; SOFT TISSUE PRN
Status: DISCONTINUED | OUTPATIENT
Start: 2022-03-24 | End: 2022-03-24

## 2022-03-24 RX ORDER — HYDROMORPHONE HYDROCHLORIDE 1 MG/ML
0.2 INJECTION, SOLUTION INTRAMUSCULAR; INTRAVENOUS; SUBCUTANEOUS EVERY 5 MIN PRN
Status: DISCONTINUED | OUTPATIENT
Start: 2022-03-24 | End: 2022-03-24

## 2022-03-24 RX ORDER — EPHEDRINE SULFATE 50 MG/ML
INJECTION, SOLUTION INTRAMUSCULAR; INTRAVENOUS; SUBCUTANEOUS PRN
Status: DISCONTINUED | OUTPATIENT
Start: 2022-03-24 | End: 2022-03-24

## 2022-03-24 RX ORDER — NALOXONE HYDROCHLORIDE 0.4 MG/ML
0.2 INJECTION, SOLUTION INTRAMUSCULAR; INTRAVENOUS; SUBCUTANEOUS
Status: DISCONTINUED | OUTPATIENT
Start: 2022-03-24 | End: 2022-03-24 | Stop reason: HOSPADM

## 2022-03-24 RX ORDER — LIDOCAINE 40 MG/G
CREAM TOPICAL
Status: DISCONTINUED | OUTPATIENT
Start: 2022-03-24 | End: 2022-03-25 | Stop reason: HOSPADM

## 2022-03-24 RX ORDER — NALOXONE HYDROCHLORIDE 0.4 MG/ML
0.4 INJECTION, SOLUTION INTRAMUSCULAR; INTRAVENOUS; SUBCUTANEOUS
Status: DISCONTINUED | OUTPATIENT
Start: 2022-03-24 | End: 2022-03-24 | Stop reason: HOSPADM

## 2022-03-24 RX ORDER — SODIUM CHLORIDE, SODIUM LACTATE, POTASSIUM CHLORIDE, CALCIUM CHLORIDE 600; 310; 30; 20 MG/100ML; MG/100ML; MG/100ML; MG/100ML
INJECTION, SOLUTION INTRAVENOUS CONTINUOUS
Status: DISCONTINUED | OUTPATIENT
Start: 2022-03-24 | End: 2022-03-25 | Stop reason: HOSPADM

## 2022-03-24 RX ORDER — NALOXONE HYDROCHLORIDE 0.4 MG/ML
0.2 INJECTION, SOLUTION INTRAMUSCULAR; INTRAVENOUS; SUBCUTANEOUS
Status: DISCONTINUED | OUTPATIENT
Start: 2022-03-24 | End: 2022-03-25 | Stop reason: HOSPADM

## 2022-03-24 RX ORDER — CEFAZOLIN SODIUM/WATER 2 G/20 ML
2 SYRINGE (ML) INTRAVENOUS
Status: COMPLETED | OUTPATIENT
Start: 2022-03-24 | End: 2022-03-24

## 2022-03-24 RX ORDER — ONDANSETRON 4 MG/1
4 TABLET, ORALLY DISINTEGRATING ORAL EVERY 6 HOURS PRN
Status: DISCONTINUED | OUTPATIENT
Start: 2022-03-24 | End: 2022-03-25 | Stop reason: HOSPADM

## 2022-03-24 RX ORDER — FENTANYL CITRATE 50 UG/ML
INJECTION, SOLUTION INTRAMUSCULAR; INTRAVENOUS PRN
Status: DISCONTINUED | OUTPATIENT
Start: 2022-03-24 | End: 2022-03-24

## 2022-03-24 RX ORDER — OMEGA-3S/DHA/EPA/FISH OIL 1000-1400
1 CAPSULE,DELAYED RELEASE (ENTERIC COATED) ORAL DAILY
COMMUNITY

## 2022-03-24 RX ORDER — ACETAMINOPHEN 325 MG/1
650 TABLET ORAL EVERY 4 HOURS
Qty: 100 TABLET | Refills: 0 | Status: SHIPPED | OUTPATIENT
Start: 2022-03-24

## 2022-03-24 RX ORDER — POLYETHYLENE GLYCOL 3350 17 G/17G
17 POWDER, FOR SOLUTION ORAL DAILY
Status: DISCONTINUED | OUTPATIENT
Start: 2022-03-25 | End: 2022-03-25 | Stop reason: HOSPADM

## 2022-03-24 RX ORDER — HYDROXYZINE HYDROCHLORIDE 25 MG/1
25 TABLET, FILM COATED ORAL EVERY 6 HOURS PRN
Qty: 30 TABLET | Refills: 0 | Status: SHIPPED | OUTPATIENT
Start: 2022-03-24 | End: 2023-04-13

## 2022-03-24 RX ORDER — BUPIVACAINE HYDROCHLORIDE 2.5 MG/ML
INJECTION, SOLUTION EPIDURAL; INFILTRATION; INTRACAUDAL
Status: COMPLETED | OUTPATIENT
Start: 2022-03-24 | End: 2022-03-24

## 2022-03-24 RX ORDER — ACETAMINOPHEN 325 MG/1
975 TABLET ORAL EVERY 8 HOURS
Status: DISCONTINUED | OUTPATIENT
Start: 2022-03-24 | End: 2022-03-25 | Stop reason: HOSPADM

## 2022-03-24 RX ORDER — ONDANSETRON 4 MG/1
4 TABLET, ORALLY DISINTEGRATING ORAL EVERY 30 MIN PRN
Status: DISCONTINUED | OUTPATIENT
Start: 2022-03-24 | End: 2022-03-24

## 2022-03-24 RX ORDER — SODIUM CHLORIDE, SODIUM LACTATE, POTASSIUM CHLORIDE, CALCIUM CHLORIDE 600; 310; 30; 20 MG/100ML; MG/100ML; MG/100ML; MG/100ML
INJECTION, SOLUTION INTRAVENOUS CONTINUOUS PRN
Status: DISCONTINUED | OUTPATIENT
Start: 2022-03-24 | End: 2022-03-24

## 2022-03-24 RX ORDER — DEXAMETHASONE SODIUM PHOSPHATE 10 MG/ML
INJECTION, SOLUTION INTRAMUSCULAR; INTRAVENOUS
Status: COMPLETED | OUTPATIENT
Start: 2022-03-24 | End: 2022-03-24

## 2022-03-24 RX ORDER — NALOXONE HYDROCHLORIDE 0.4 MG/ML
0.4 INJECTION, SOLUTION INTRAMUSCULAR; INTRAVENOUS; SUBCUTANEOUS
Status: DISCONTINUED | OUTPATIENT
Start: 2022-03-24 | End: 2022-03-25 | Stop reason: HOSPADM

## 2022-03-24 RX ORDER — POLYETHYLENE GLYCOL 3350 17 G/17G
1 POWDER, FOR SOLUTION ORAL DAILY
Qty: 7 PACKET | Refills: 0 | Status: SHIPPED | OUTPATIENT
Start: 2022-03-24

## 2022-03-24 RX ORDER — ONDANSETRON 2 MG/ML
INJECTION INTRAMUSCULAR; INTRAVENOUS PRN
Status: DISCONTINUED | OUTPATIENT
Start: 2022-03-24 | End: 2022-03-24

## 2022-03-24 RX ORDER — LIDOCAINE 40 MG/G
CREAM TOPICAL
Status: DISCONTINUED | OUTPATIENT
Start: 2022-03-24 | End: 2022-03-24 | Stop reason: HOSPADM

## 2022-03-24 RX ORDER — OXYCODONE HYDROCHLORIDE 5 MG/1
5-10 TABLET ORAL
Qty: 40 TABLET | Refills: 0 | Status: SHIPPED | OUTPATIENT
Start: 2022-03-24 | End: 2022-03-29

## 2022-03-24 RX ORDER — FENTANYL CITRATE 50 UG/ML
25 INJECTION, SOLUTION INTRAMUSCULAR; INTRAVENOUS EVERY 5 MIN PRN
Status: DISCONTINUED | OUTPATIENT
Start: 2022-03-24 | End: 2022-03-24

## 2022-03-24 RX ORDER — PROPOFOL 10 MG/ML
INJECTION, EMULSION INTRAVENOUS PRN
Status: DISCONTINUED | OUTPATIENT
Start: 2022-03-24 | End: 2022-03-24

## 2022-03-24 RX ORDER — ACETAMINOPHEN 325 MG/1
650 TABLET ORAL EVERY 4 HOURS PRN
Status: DISCONTINUED | OUTPATIENT
Start: 2022-03-27 | End: 2022-03-25 | Stop reason: HOSPADM

## 2022-03-24 RX ORDER — DEXMEDETOMIDINE HYDROCHLORIDE 4 UG/ML
INJECTION, SOLUTION INTRAVENOUS PRN
Status: DISCONTINUED | OUTPATIENT
Start: 2022-03-24 | End: 2022-03-24

## 2022-03-24 RX ORDER — SODIUM CHLORIDE, SODIUM LACTATE, POTASSIUM CHLORIDE, CALCIUM CHLORIDE 600; 310; 30; 20 MG/100ML; MG/100ML; MG/100ML; MG/100ML
INJECTION, SOLUTION INTRAVENOUS CONTINUOUS
Status: DISCONTINUED | OUTPATIENT
Start: 2022-03-24 | End: 2022-03-24 | Stop reason: HOSPADM

## 2022-03-24 RX ORDER — FENTANYL CITRATE 50 UG/ML
25-50 INJECTION, SOLUTION INTRAMUSCULAR; INTRAVENOUS
Status: DISCONTINUED | OUTPATIENT
Start: 2022-03-24 | End: 2022-03-24 | Stop reason: HOSPADM

## 2022-03-24 RX ORDER — OXYCODONE HYDROCHLORIDE 5 MG/1
5 TABLET ORAL EVERY 4 HOURS PRN
Status: DISCONTINUED | OUTPATIENT
Start: 2022-03-24 | End: 2022-03-24 | Stop reason: HOSPADM

## 2022-03-24 RX ORDER — LIDOCAINE HYDROCHLORIDE 20 MG/ML
INJECTION, SOLUTION INFILTRATION; PERINEURAL PRN
Status: DISCONTINUED | OUTPATIENT
Start: 2022-03-24 | End: 2022-03-24

## 2022-03-24 RX ORDER — HYDROXYZINE HYDROCHLORIDE 25 MG/1
25 TABLET, FILM COATED ORAL EVERY 6 HOURS PRN
Status: DISCONTINUED | OUTPATIENT
Start: 2022-03-24 | End: 2022-03-25 | Stop reason: HOSPADM

## 2022-03-24 RX ORDER — CEFAZOLIN SODIUM 1 G/3ML
1 INJECTION, POWDER, FOR SOLUTION INTRAMUSCULAR; INTRAVENOUS EVERY 8 HOURS
Status: COMPLETED | OUTPATIENT
Start: 2022-03-25 | End: 2022-03-25

## 2022-03-24 RX ORDER — FLUMAZENIL 0.1 MG/ML
0.2 INJECTION, SOLUTION INTRAVENOUS
Status: DISCONTINUED | OUTPATIENT
Start: 2022-03-24 | End: 2022-03-24 | Stop reason: HOSPADM

## 2022-03-24 RX ORDER — DIMENHYDRINATE 50 MG/ML
25 INJECTION, SOLUTION INTRAMUSCULAR; INTRAVENOUS
Status: DISCONTINUED | OUTPATIENT
Start: 2022-03-24 | End: 2022-03-24

## 2022-03-24 RX ORDER — AMOXICILLIN 250 MG
1-2 CAPSULE ORAL 2 TIMES DAILY
Qty: 30 TABLET | Refills: 0 | Status: SHIPPED | OUTPATIENT
Start: 2022-03-24 | End: 2023-04-13

## 2022-03-24 RX ORDER — OXYCODONE HYDROCHLORIDE 10 MG/1
10 TABLET ORAL EVERY 4 HOURS PRN
Status: DISCONTINUED | OUTPATIENT
Start: 2022-03-24 | End: 2022-03-25 | Stop reason: HOSPADM

## 2022-03-24 RX ORDER — HYDROMORPHONE HYDROCHLORIDE 1 MG/ML
0.4 INJECTION, SOLUTION INTRAMUSCULAR; INTRAVENOUS; SUBCUTANEOUS
Status: DISCONTINUED | OUTPATIENT
Start: 2022-03-24 | End: 2022-03-25 | Stop reason: HOSPADM

## 2022-03-24 RX ORDER — ACETAMINOPHEN 325 MG/1
975 TABLET ORAL ONCE
Status: COMPLETED | OUTPATIENT
Start: 2022-03-24 | End: 2022-03-24

## 2022-03-24 RX ORDER — LAMOTRIGINE 150 MG/1
300 TABLET ORAL AT BEDTIME
Status: DISCONTINUED | OUTPATIENT
Start: 2022-03-24 | End: 2022-03-25 | Stop reason: HOSPADM

## 2022-03-24 RX ORDER — CEFAZOLIN SODIUM/WATER 2 G/20 ML
2 SYRINGE (ML) INTRAVENOUS SEE ADMIN INSTRUCTIONS
Status: DISCONTINUED | OUTPATIENT
Start: 2022-03-24 | End: 2022-03-24 | Stop reason: HOSPADM

## 2022-03-24 RX ORDER — PROCHLORPERAZINE MALEATE 10 MG
10 TABLET ORAL EVERY 6 HOURS PRN
Status: DISCONTINUED | OUTPATIENT
Start: 2022-03-24 | End: 2022-03-25 | Stop reason: HOSPADM

## 2022-03-24 RX ORDER — BISACODYL 10 MG
10 SUPPOSITORY, RECTAL RECTAL DAILY PRN
Status: DISCONTINUED | OUTPATIENT
Start: 2022-03-24 | End: 2022-03-25 | Stop reason: HOSPADM

## 2022-03-24 RX ORDER — HYDROMORPHONE HYDROCHLORIDE 1 MG/ML
0.2 INJECTION, SOLUTION INTRAMUSCULAR; INTRAVENOUS; SUBCUTANEOUS
Status: DISCONTINUED | OUTPATIENT
Start: 2022-03-24 | End: 2022-03-25 | Stop reason: HOSPADM

## 2022-03-24 RX ORDER — OXYCODONE HYDROCHLORIDE 5 MG/1
5 TABLET ORAL EVERY 4 HOURS PRN
Status: DISCONTINUED | OUTPATIENT
Start: 2022-03-24 | End: 2022-03-25 | Stop reason: HOSPADM

## 2022-03-24 RX ORDER — ONDANSETRON 2 MG/ML
4 INJECTION INTRAMUSCULAR; INTRAVENOUS EVERY 30 MIN PRN
Status: DISCONTINUED | OUTPATIENT
Start: 2022-03-24 | End: 2022-03-24

## 2022-03-24 RX ADMIN — Medication 5 MG: at 13:41

## 2022-03-24 RX ADMIN — FENTANYL CITRATE 25 MCG: 50 INJECTION INTRAMUSCULAR; INTRAVENOUS at 17:59

## 2022-03-24 RX ADMIN — ROCURONIUM BROMIDE 10 MG: 50 INJECTION, SOLUTION INTRAVENOUS at 15:00

## 2022-03-24 RX ADMIN — Medication 5 MG: at 14:07

## 2022-03-24 RX ADMIN — MIDAZOLAM HYDROCHLORIDE 2 MG: 1 INJECTION, SOLUTION INTRAMUSCULAR; INTRAVENOUS at 12:10

## 2022-03-24 RX ADMIN — ACETAMINOPHEN 975 MG: 325 TABLET, FILM COATED ORAL at 10:25

## 2022-03-24 RX ADMIN — DEXMEDETOMIDINE HYDROCHLORIDE 20 MCG: 4 INJECTION, SOLUTION INTRAVENOUS at 17:00

## 2022-03-24 RX ADMIN — HYDROMORPHONE HYDROCHLORIDE 0.25 MG: 1 INJECTION, SOLUTION INTRAMUSCULAR; INTRAVENOUS; SUBCUTANEOUS at 15:44

## 2022-03-24 RX ADMIN — PHENYLEPHRINE HYDROCHLORIDE 100 MCG: 10 INJECTION INTRAVENOUS at 13:10

## 2022-03-24 RX ADMIN — PROPOFOL 150 MG: 10 INJECTION, EMULSION INTRAVENOUS at 12:50

## 2022-03-24 RX ADMIN — Medication 2 G: at 12:58

## 2022-03-24 RX ADMIN — PHENYLEPHRINE HYDROCHLORIDE 100 MCG: 10 INJECTION INTRAVENOUS at 13:34

## 2022-03-24 RX ADMIN — ROCURONIUM BROMIDE 20 MG: 50 INJECTION, SOLUTION INTRAVENOUS at 16:00

## 2022-03-24 RX ADMIN — ROCURONIUM BROMIDE 10 MG: 50 INJECTION, SOLUTION INTRAVENOUS at 14:00

## 2022-03-24 RX ADMIN — FENTANYL CITRATE 25 MCG: 50 INJECTION INTRAMUSCULAR; INTRAVENOUS at 12:10

## 2022-03-24 RX ADMIN — SODIUM CHLORIDE, POTASSIUM CHLORIDE, SODIUM LACTATE AND CALCIUM CHLORIDE: 600; 310; 30; 20 INJECTION, SOLUTION INTRAVENOUS at 16:48

## 2022-03-24 RX ADMIN — PROPOFOL 50 MG: 10 INJECTION, EMULSION INTRAVENOUS at 12:51

## 2022-03-24 RX ADMIN — BUPIVACAINE HYDROCHLORIDE 20 ML: 2.5 INJECTION, SOLUTION EPIDURAL; INFILTRATION; INTRACAUDAL at 12:15

## 2022-03-24 RX ADMIN — DEXAMETHASONE SODIUM PHOSPHATE 8 MG: 4 INJECTION, SOLUTION INTRAMUSCULAR; INTRAVENOUS at 16:24

## 2022-03-24 RX ADMIN — PHENYLEPHRINE HYDROCHLORIDE 50 MCG: 10 INJECTION INTRAVENOUS at 13:16

## 2022-03-24 RX ADMIN — SODIUM CHLORIDE, POTASSIUM CHLORIDE, SODIUM LACTATE AND CALCIUM CHLORIDE: 600; 310; 30; 20 INJECTION, SOLUTION INTRAVENOUS at 19:40

## 2022-03-24 RX ADMIN — FENTANYL CITRATE 100 MCG: 50 INJECTION, SOLUTION INTRAMUSCULAR; INTRAVENOUS at 12:48

## 2022-03-24 RX ADMIN — HYDROMORPHONE HYDROCHLORIDE 0.25 MG: 1 INJECTION, SOLUTION INTRAMUSCULAR; INTRAVENOUS; SUBCUTANEOUS at 15:46

## 2022-03-24 RX ADMIN — PHENYLEPHRINE HYDROCHLORIDE 50 MCG: 10 INJECTION INTRAVENOUS at 13:05

## 2022-03-24 RX ADMIN — DEXMEDETOMIDINE HYDROCHLORIDE 20 MCG: 4 INJECTION, SOLUTION INTRAVENOUS at 12:20

## 2022-03-24 RX ADMIN — PHENYLEPHRINE HYDROCHLORIDE 100 MCG: 10 INJECTION INTRAVENOUS at 14:31

## 2022-03-24 RX ADMIN — FENTANYL CITRATE 50 MCG: 50 INJECTION, SOLUTION INTRAMUSCULAR; INTRAVENOUS at 13:33

## 2022-03-24 RX ADMIN — ONDANSETRON 4 MG: 2 INJECTION INTRAMUSCULAR; INTRAVENOUS at 16:25

## 2022-03-24 RX ADMIN — FENTANYL CITRATE 25 MCG: 50 INJECTION INTRAMUSCULAR; INTRAVENOUS at 18:14

## 2022-03-24 RX ADMIN — ROCURONIUM BROMIDE 50 MG: 50 INJECTION, SOLUTION INTRAVENOUS at 12:54

## 2022-03-24 RX ADMIN — Medication 5 MG: at 16:36

## 2022-03-24 RX ADMIN — LAMOTRIGINE 300 MG: 150 TABLET ORAL at 23:37

## 2022-03-24 RX ADMIN — Medication 20 MCG: at 12:15

## 2022-03-24 RX ADMIN — Medication 2 G: at 16:58

## 2022-03-24 RX ADMIN — DEXAMETHASONE SODIUM PHOSPHATE 1 MG: 10 INJECTION, SOLUTION INTRAMUSCULAR; INTRAVENOUS at 12:15

## 2022-03-24 RX ADMIN — OXYCODONE HYDROCHLORIDE 5 MG: 5 TABLET ORAL at 21:02

## 2022-03-24 RX ADMIN — DOCUSATE SODIUM AND SENNOSIDES 1 TABLET: 8.6; 5 TABLET ORAL at 21:02

## 2022-03-24 RX ADMIN — SODIUM CHLORIDE, POTASSIUM CHLORIDE, SODIUM LACTATE AND CALCIUM CHLORIDE: 600; 310; 30; 20 INJECTION, SOLUTION INTRAVENOUS at 12:43

## 2022-03-24 RX ADMIN — BUPIVACAINE HYDROCHLORIDE 20 ML: 2.5 INJECTION, SOLUTION EPIDURAL; INFILTRATION; INTRACAUDAL at 12:20

## 2022-03-24 RX ADMIN — Medication 5 MG: at 16:39

## 2022-03-24 RX ADMIN — DEXAMETHASONE SODIUM PHOSPHATE 1 MG: 10 INJECTION, SOLUTION INTRAMUSCULAR; INTRAVENOUS at 12:20

## 2022-03-24 RX ADMIN — ROCURONIUM BROMIDE 10 MG: 50 INJECTION, SOLUTION INTRAVENOUS at 13:28

## 2022-03-24 RX ADMIN — ACETAMINOPHEN 975 MG: 325 TABLET, FILM COATED ORAL at 19:46

## 2022-03-24 RX ADMIN — FENTANYL CITRATE 50 MCG: 50 INJECTION, SOLUTION INTRAMUSCULAR; INTRAVENOUS at 15:08

## 2022-03-24 RX ADMIN — SUGAMMADEX 200 MG: 100 INJECTION, SOLUTION INTRAVENOUS at 16:28

## 2022-03-24 RX ADMIN — LIDOCAINE HYDROCHLORIDE 100 MG: 20 INJECTION, SOLUTION INFILTRATION; PERINEURAL at 12:49

## 2022-03-24 ASSESSMENT — ACTIVITIES OF DAILY LIVING (ADL)
ADLS_ACUITY_SCORE: 3

## 2022-03-24 NOTE — ANESTHESIA PROCEDURE NOTES
Airway       Patient location during procedure: OR       Procedure Start/Stop Times: 3/24/2022 1:01 PM  Staff -        Anesthesiologist:  Gissell Garcia MD       Resident/Fellow: Sal Garcia MD       Performed By: resident  Consent for Airway        Urgency: elective  Indications and Patient Condition       Indications for airway management: brody-procedural       Induction type:intravenous       Mask difficulty assessment: 1 - vent by mask    Final Airway Details       Final airway type: endotracheal airway       Successful airway: ETT - single  Endotracheal Airway Details        ETT size (mm): 7.0       Cuffed: yes       Successful intubation technique: video laryngoscopy       VL Blade Size: MAC 3       Grade View of Cords: 2       Adjucts: stylet and bougie       Position: Right       Measured from: gums/teeth       Secured at (cm): 21       Bite block used: None    Post intubation assessment        Placement verified by: capnometry, equal breath sounds and chest rise        Number of attempts at approach: 2       Number of other approaches attempted: 1       Secured with: pink tape (Covered with tegaderm)       Ease of procedure: easy       Dentition: Unchanged    Additional Comments       Small mouth opening. Attempted MAC3 DL with G3V. Attempted VL with MAC3 and was able to get G2V. Used bougie to facilitate intubation.

## 2022-03-24 NOTE — ANESTHESIA CARE TRANSFER NOTE
Patient: Clau Wilder    Procedure: Procedure(s):  Open anterior synovectomy Right knee  Open posterior synovectomy Right knee       Diagnosis: Tenosynovial giant cell tumor of knee [D48.1]  Diagnosis Additional Information: No value filed.    Anesthesia Type:   General     Note:    Oropharynx: spontaneously breathing  Level of Consciousness: iatrogenic sedation  Oxygen Supplementation: nasal cannula  Level of Supplemental Oxygen (L/min / FiO2): 3  Independent Airway: airway patency satisfactory and stable  Dentition: dentition unchanged  Vital Signs Stable: post-procedure vital signs reviewed and stable  Report to RN Given: handoff report given  Patient transferred to: PACU    Handoff Report: Identifed the Patient, Identified the Reponsible Provider, Reviewed the pertinent medical history, Discussed the surgical course, Reviewed Intra-OP anesthesia mangement and issues during anesthesia, Set expectations for post-procedure period and Allowed opportunity for questions and acknowledgement of understanding      Vitals:  Vitals Value Taken Time   /72 03/24/22 1721   Temp 36.5    Pulse 83 03/24/22 1728   Resp 15 03/24/22 1728   SpO2 100 % 03/24/22 1728   Vitals shown include unvalidated device data.    Electronically Signed By: EFRAIN PHILLIPS CRNA  March 24, 2022  5:29 PM

## 2022-03-24 NOTE — ANESTHESIA POSTPROCEDURE EVALUATION
Patient: Clau Wilder    Procedure: Procedure(s):  Open anterior synovectomy Right knee  Open posterior synovectomy Right knee       Anesthesia Type:  General    Note:     Postop Pain Control: Uneventful            Sign Out: Well controlled pain   PONV: No   Neuro/Psych: Uneventful            Sign Out: Acceptable/Baseline neuro status   Airway/Respiratory: Uneventful            Sign Out: Acceptable/Baseline resp. status   CV/Hemodynamics: Uneventful            Sign Out: Acceptable CV status; No obvious hypovolemia; No obvious fluid overload   Other NRE: NONE   DID A NON-ROUTINE EVENT OCCUR? No           Last vitals:  Vitals Value Taken Time   /77 03/24/22 1845   Temp 36.4  C (97.6  F) 03/24/22 1721   Pulse 81 03/24/22 1854   Resp 13 03/24/22 1854   SpO2 99 % 03/24/22 1845   Vitals shown include unvalidated device data.    Electronically Signed By: Cipriano Randle DO  March 24, 2022  6:55 PM

## 2022-03-24 NOTE — CONSULTS
[facility]  Consult Note - Hospitalist Service  Date of Admission:  3/24/2022  Consult Requested by: Uriah Jenkins PA-C  Reason for Consult: Postoperative co-management     Assessment & Plan   Clau Wilder is a 37 year old female admitted on 3/24/2022. She has a known h/o recurrent falls, resulting in TBI and frequent headaches. 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 Please see their notes for further details  Continue IV fluids until able to take oral diet   Pain control with acetaminophen 975 mg every 8 hrs for 3 days  Oxycodone 5-10 mg every 3 hrs PRN and IV hydromorphone 0.2-0.4mg q 2 hrs  PRN for breakthrough pain   DVT prophylaxis with  SCDs while in hospital  Perioperative antibiotics as per primary team   Overnight Capnography monitoring  PT/OT as per protocol  Arizmendi to come out on POD#1 unless clinically indicated to remain.  Incentive spirometry and aggressive bowel regimen (This has been ordered)  We will monitor for acute blood loss anemia. Pre op Hgb at 12.1     #TBI   #Headaches   Resume 300 mg of Lamictal at bed time    Depression:   Resume home dose of Fluoxetine 20 mg every morning          The patient's care was discussed with the Bedside Nurse and Patient.    Andrés Vazquez MD  [facility]  Securely message with the Vocera Web Console (learn more here)  Text page via [facility] Paging/Directory       Hospitalist Service    Clinically Significant Risk Factors Present on Admission                  # Overweight: Estimated body mass index is 26.93 kg/m  as calculated from the  "following:    Height as of this encounter: 1.651 m (5' 5\").    Weight as of this encounter: 73.4 kg (161 lb 13.1 oz).      ______________________________________________________________________    Chief Complaint   S/p Anterior and posterior Synovectomy of the right knee     History is obtained from the patient, pre op physical and perioperative note     History of Present Illness   Clau Wilder is a 37 year old female who underwent the above procedure today under general anesthesia.  Estimated blood loss of 20 cc.  Patient received about 1000 mL of lactated Ringer solution as perioperative fluids.  Postoperatively patient recovered well.    Patient denies any chest pain or shortness of breath.  She denies any fevers or chills.  She denies any nausea, vomiting or diarrhea.  The parents were at bedside.  Her past medical history was reviewed and reconciled.  She denies any active headaches at this point of time.    Review of Systems   The 10 point Review of Systems is negative other than noted in the HPI or here.     Past Medical History    I have reviewed this patient's medical history and updated it with pertinent information if needed.   Past Medical History:   Diagnosis Date     Calculi, ureter      Depressive disorder      IUD migration     perforation     Mental disorder        Past Surgical History   I have reviewed this patient's surgical history and updated it with pertinent information if needed.  Past Surgical History:   Procedure Laterality Date     ARTHROSCOPY KNEE Right 2022    Procedure: Athroscopic synovial biopsy Right knee;  Surgeon: Kelton Sanders MD;  Location: [facility]      SECTION       [id number] with stone removal       HYSTERECTOMY VAGINAL, BILATERAL SALPINGO-OOPHERECTOMY, COMBINED       HYSTEROSCOPY DIAGNOSTIC  2016     LAPAROSCOPY OPERATIVE ADULT  2016    removal of ectopic IUD     ORTHOPEDIC SURGERY      tendon repair ankle     [place] HAND/FINGER SURGERY " UNLISTED  10/06/2021     [place] STOMACH SURGERY PROCEDURE UNLISTED      , IUD retrieval       Social History   I have reviewed this patient's social history and updated it with pertinent information if needed.  Social History     Tobacco Use     Smoking status: Never Smoker     Smokeless tobacco: Never Used   Vaping Use     Vaping Use: Never used   Substance Use Topics     Alcohol use: Yes     Comment: occ.     Drug use: No       Family History   I have reviewed this patient's family history and updated it with pertinent information if needed.  Family History   Problem Relation Age of Onset     Thyroid Cancer Mother      Other Cancer Mother         Thyroid cancer     Lupus Maternal Grandmother      Aneurysm Paternal Grandfather      Anesthesia Reaction No family hx of      Deep Vein Thrombosis (DVT) No family hx of        Medications   I have reviewed this patient's current medications    Allergies   Allergies   Allergen Reactions     Erythromycin Nausea and Vomiting       Physical Exam   Vital Signs: Temp: 97.6  F (36.4  C) Temp src: Oral BP: 121/77 Pulse: 86   Resp: 12 SpO2: 99 % O2 Device: None (Room air) Oxygen Delivery: 2 LPM  Weight: 161 lbs 13.08 oz    Constitutional: awake, alert, cooperative, no apparent distress, and appears stated age  Eyes: Lids and lashes normal, pupils equal, round and reactive to light, extra ocular muscles intact, sclera clear, conjunctiva normal  ENT: Normocephalic, without obvious abnormality, atraumatic, sinuses nontender on palpation, external ears without lesions, oral pharynx with moist mucous membranes.  Respiratory: No increased work of breathing, good air exchange, clear to auscultation bilaterally, no crackles or wheezing  Cardiovascular: Normal apical impulse, regular rate and rhythm, normal S1 and S2, no S3 or S4, and no murmur noted  GI: Normal bowel sounds, soft, non-distended, non-tender, no masses palpated, no hepatosplenomegally  Skin: no bruising or  bleeding  Musculoskeletal: no lower extremity pitting edema present  Neurologic: Awake, alert, oriented to name, place and time.  Cranial nerves II-XII are grossly intact.    Data   Results for orders placed or performed during the hospital encounter of 03/24/22 (from the past 24 hour(s))   POC US Guidance Needle Placement    Impression    Right femoral and popliteal sciatic blocks   Glucose by meter   Result Value Ref Range    GLUCOSE BY METER POCT 97 70 - 99 mg/dL   CBC with platelets   Result Value Ref Range    WBC Count 5.5 4.0 - 11.0 10e3/uL    RBC Count 4.02 3.80 - 5.20 10e6/uL    Hemoglobin 12.1 11.7 - 15.7 g/dL    Hematocrit 36.4 35.0 - 47.0 %    MCV 91 78 - 100 fL    MCH 30.1 26.5 - 33.0 pg    MCHC 33.2 31.5 - 36.5 g/dL    RDW 12.4 10.0 - 15.0 %    Platelet Count 218 150 - 450 10e3/uL   ABO/Rh type and screen    Narrative    The following orders were created for panel order ABO/Rh type and screen.  Procedure                               Abnormality         Status                     ---------                               -----------         ------                     Adult Type and Screen[[id number]]                            Final result                 Please view results for these tests on the individual orders.   Adult Type and Screen   Result Value Ref Range    ABO/RH(D) A POS     Antibody Screen Negative Negative    SPECIMEN EXPIRATION DATE [id number]    Extra Tube    Narrative    The following orders were created for panel order Extra Tube.  Procedure                               Abnormality         Status                     ---------                               -----------         ------                     Extra Green Top (Lithium...[[id number]]                      Final result                 Please view results for these tests on the individual orders.   Extra Green Top (Lithium Heparin) Tube   Result Value Ref Range    Hold Specimen [facility]    ABO and Rh   Result Value Ref Range     ABO/RH(D) A POS     SPECIMEN EXPIRATION DATE [id number]

## 2022-03-24 NOTE — ANESTHESIA PROCEDURE NOTES
Sciatic Procedure Note    Pre-Procedure   Staff -        Anesthesiologist:  Ferny Ulloa MD       Resident/Fellow: Sal Garcia MD       Performed By: resident       Location: pre-op       Pre-Anesthestic Checklist: patient identified, IV checked, site marked, risks and benefits discussed, informed consent, monitors and equipment checked, pre-op evaluation, at physician/surgeon's request and post-op pain management  Timeout:       Correct Patient: Yes        Correct Procedure: Yes        Correct Site: Yes        Correct Position: Yes        Correct Laterality: Yes        Site Marked: Yes  Procedure Documentation  Procedure: Sciatic       Diagnosis: POST OPERATIVE PAIN       Laterality: right       Patient Position: supine       Patient Prep/Sterile Barriers: sterile gloves, mask       Skin prep: Chloraprep (popliteal approach).       Needle Type: short bevel       Needle Gauge: 21.        Needle Length (millimeters): 110        Ultrasound guided       1. Ultrasound was used to identify targeted nerve, plexus, vascular marker, or fascial plane and place a needle adjacent to it in real-time.       2. Ultrasound was used to visualize the spread of anesthetic in close proximity to the above referenced structure.       3. A permanent image is entered into the patient's record.    Assessment/Narrative         The placement was negative for: blood aspirated, painful injection and site bleeding       Paresthesias: No.     Bolus given via needle..        Secured via.        Insertion/Infusion Method: Single Shot       Complications: none       Injection made incrementally with aspirations every 5 mL.    Medication(s) Administered   Bupivacaine 0.25% PF (Infiltration), 20 mL  Dexamethasone 10 mg/mL PF (Perineural), 1 mg  Dexmedetomidine 4 mcg/mL (Perineural), 20 mcg  Medication Administration Time: 3/24/2022 12:20 PM

## 2022-03-24 NOTE — BRIEF OP NOTE
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 Sanders  Assistants:   Uriah Jenkins PA-C    Anesthesia: General  EBL: 20 cc  UOP: see anesthesia record  Tourniquet Time: 120 minutes at 250 mmHg    Implants:   * No implants in log *  Drains: sara dubon to be removed before discharge  Intra-op Labs/Cxs/Specimens:   ID Type Source Tests Collected by Time Destination   1 : Anterior Synovium Tissue Knee, Right SURGICAL PATHOLOGY EXAM Kelton Sanders MD 3/24/2022  2:09 PM    2 : Posterior Synovium Tissue Knee, Right SURGICAL PATHOLOGY EXAM Kelton Sanders MD 3/24/2022  4:25 PM      Complications: No apparent complications during procedure  Findings: Please see dictated operative note for details    Disposition: Stable to PACU, then admit to Orthopaedics.    Post-Op Plan:  Assessment/Plan: Clau Wilder is a 37 year old female s/p Procedure(s):  Open anterior synovectomy Right knee  Open posterior synovectomy Right knee on 3/24/2022 with Dr. Sanders.    Activity: Up with assist and assistive devices as needed until independent. Knee ROM as tolerated. Advance CPM as tolerated to goal of 0 to 90 degrees.  Weight bearing status: WBAT    Antibiotics: Cefazolin x 24 hours   Diet: Begin with clear fluids and progress diet as tolerated. Bowel regimen. Anti-emetics PRN.    DVT prophylaxis: none  Elevation: Elevate heels off of bed on pillows, no pillows behind the knee at any time    Wound Care: Alginate, tegaderm to be changed PRN by ortho PRN  Drains: sara dubon and ace wrap to be removed before discharge  Arizmendi: none  Pain management: Orals PRN, IV for breakthrough only  X-rays: AP/Lat knee XR in PACU  Physical  Therapy: Mobilization, ROM, ADL's  Occupational Therapy: ADL's  Labs: Trend Hgb on PODs #1  Cultures: None  Consults: PT, OT. Hospitalist, appreciate assistance in caring for this patient throughout the perioperative period    Future Appointments   Date Time Provider Department Center   4/11/2022  1:10 PM [id number]   4/11/2022  1:30 PM Kelton Sanders MD [facility]       Disposition: Pending progress with therapies, pain control on orals, and medical stability, anticipate discharge to Home vs TCU on POD #1  Follow up: Plan for follow up with Dr. Sanders in 2 weeks     I assisted with positioning, prepping and draping, and closure.      Uriah Jenkins PA-C  3/24/2022 5:56 PM  Physician Assistant   Oncology and Adult Reconstructive Surgery  Dept Orthopaedic Surgery, [facility]     Thank you for allowing me to participate in this patient's care. Please page me directly any questions/concerns.   Securely message with the Vocera Web Console (learn more here)  Text page via [employer] Paging/Directory    If there is no response, if it is a weekend, or if it is during evening hours, please page the orthopaedic surgery resident on call via [employer] Paging/Directory

## 2022-03-24 NOTE — OR NURSING
PACU to Inpatient Nursing Handoff    Patient Clau Wilder is a 37 year old female who speaks English.   Procedure Procedure(s):  Open anterior synovectomy Right knee  Open posterior synovectomy Right knee   Surgeon(s) Primary: Kelton Sanders MD     Allergies   Allergen Reactions     Erythromycin Nausea and Vomiting       Isolation  No active isolations     Past Medical History   has a past medical history of Calculi, ureter, Depressive disorder, IUD migration, and Mental disorder.    Anesthesia General   Dermatome Level     Preop Meds acetaminophen (Tylenol) - time given: 975  2 mg IV Versed  - time given: 1210   Nerve block Femoral (popliteal).  Location:right. Med:bupivacaine. Time given: 1215   Intraop Meds dexamethasone (Decadron)  dexmedetomidine (Precedex): 40 mcg total  fentanyl (Sublimaze): 200 mcg total  hydromorphone (Dilaudid): .5 mg total  ondansetron (Zofran): last given at 1625   Local Meds No   Antibiotics cefazolin (Ancef) - last given at 1258     Pain Patient Currently in Pain: yes   PACU meds  fentanyl (Sublimaze): 50 mcg (total dose) last given at 1815    PCA / epidural No   Capnography     Telemetry ECG Rhythm: Sinus rhythm   Inpatient Telemetry Monitor Ordered? No        Labs Glucose Lab Results   Component Value Date    GLC 97 03/24/2022       Hgb Lab Results   Component Value Date    HGB 12.1 03/24/2022       INR No results found for: INR   PACU Imaging Not applicable     Wound/Incision Incision/Surgical Site 02/18/22 Anterior;Right;Medial Knee (Active)   Number of days: 34       Incision/Surgical Site 02/18/22 Right Knee (Active)   Number of days: 34       Incision/Surgical Site 03/24/22 Anterior;Right;Midline Knee (Active)   Incision Assessment UTV 03/24/22 1721   Reema-Incision Assessment UTV 03/24/22 1721   Closure JUAN 03/24/22 1721   Incision Drainage Amount None 03/24/22 1721   Incision Care Ice applied 03/24/22 1721   Number of days: 0      CMS        Equipment ice pack and  brace/boot   Other LDA       IV Access Peripheral IV 03/24/22 Right Hand (Active)   Site Assessment WDL 03/24/22 1721   Line Status Infusing 03/24/22 1721   Dressing Intervention New dressing  03/24/22 1126   Phlebitis Scale 0-->no symptoms 03/24/22 1721   Infiltration Scale 0 03/24/22 1721   Number of days: 0       Peripheral IV Left Hand (Active)   Site Assessment WDL 03/24/22 1721   Line Status Saline locked 03/24/22 1721   Phlebitis Scale 0-->no symptoms 03/24/22 1721   Infiltration Scale 0 03/24/22 1721   Number of days:       Blood Products Not applicable EBL 20 mL   Intake/Output Date 03/24/22 0700 - 03/25/22 0659   Shift 1028-2841 6210-8929 9410-6395 24 Hour Total   INTAKE   I.V. 400 600  1000   Shift Total(mL/kg) 400(5.45) 600(8.17)  1000(13.62)   OUTPUT   Drains  70  70   Blood  20  20   Shift Total(mL/kg)  90(1.23)  90(1.23)   Weight (kg) 73.4 73.4 73.4 73.4      Drains / [place] Closed/Suction Drain Medial Thigh Bulb 15 Citizen of [place] (Active)   Site Description UTV 03/24/22 1721   Dressing Status Normal: Clean, Dry & Intact 03/24/22 1721   Drainage Appearance Bloody/Bright Red 03/24/22 1721   Status To bulb suction 03/24/22 1721   Output (ml) 70 ml 03/24/22 1721   Number of days: 0      Time of void PreOp Void Prior to Procedure: 0900 (03/24/22 0958)    PostOp      Diapered? No   Bladder Scan Bladder Scan Volume (mL): 580 ml (03/24/22 1751)   PO    water     Vitals    B/P: 117/77  T: 97.6  F (36.4  C)    Temp src: Oral  P:  Pulse: 85 (03/24/22 1815)          R: 16  O2:  SpO2: 94 %    O2 Device: None (Room air) (03/24/22 0931)    Oxygen Delivery: 2 LPM (03/24/22 1735)         Family/support present mother and father   Patient belongings     Patient transported on bed   DC meds/scripts (obs/outpt) Not applicable   Inpatient Pain Meds Released? Yes       Special needs/considerations Pt has not had a BM since Monday 3/21, has constipation Hx   Tasks needing completion None       Marisol Bailey RN  [facility] *[phone]

## 2022-03-24 NOTE — ANESTHESIA PROCEDURE NOTES
Femoral Procedure Note    Pre-Procedure   Staff -        Anesthesiologist:  Ferny Ulloa MD       Resident/Fellow: Sal Garcia MD       Performed By: resident       Location: pre-op       Pre-Anesthestic Checklist: patient identified, IV checked, site marked, risks and benefits discussed, informed consent, monitors and equipment checked, pre-op evaluation, at physician/surgeon's request and post-op pain management  Timeout:       Correct Patient: Yes        Correct Procedure: Yes        Correct Site: Yes        Correct Position: Yes        Correct Laterality: Yes        Site Marked: Yes  Procedure Documentation  Procedure: Femoral       Diagnosis: POST OPERATIVE PAIN       Laterality: right       Patient Position: supine       Patient Prep/Sterile Barriers: sterile gloves, mask       Skin prep: Chloraprep       Needle Type: short bevel       Needle Gauge: 21.        Needle Length (millimeters): 110        Ultrasound guided       1. Ultrasound was used to identify targeted nerve, plexus, vascular marker, or fascial plane and place a needle adjacent to it in real-time.       2. Ultrasound was used to visualize the spread of anesthetic in close proximity to the above referenced structure.       3. A permanent image is entered into the patient's record.    Assessment/Narrative         The placement was negative for: blood aspirated, painful injection and site bleeding       Paresthesias: No.     Bolus given via needle..        Secured via.        Insertion/Infusion Method: Single Shot       Complications: none       Injection made incrementally with aspirations every 5 mL.    Medication(s) Administered   Bupivacaine 0.25% PF (Infiltration), 20 mL  Dexamethasone 10 mg/mL PF (Perineural), 1 mg  Dexmedetomidine 4 mcg/mL (Perineural), 20 mcg  Medication Administration Time: 3/24/2022 12:15 PM

## 2022-03-25 ENCOUNTER — APPOINTMENT (OUTPATIENT)
Dept: OCCUPATIONAL THERAPY | Facility: CLINIC | Age: 37
End: 2022-03-25
Attending: PHYSICIAN ASSISTANT
Payer: COMMERCIAL

## 2022-03-25 ENCOUNTER — APPOINTMENT (OUTPATIENT)
Dept: PHYSICAL THERAPY | Facility: CLINIC | Age: 37
End: 2022-03-25
Attending: PHYSICIAN ASSISTANT
Payer: COMMERCIAL

## 2022-03-25 VITALS
WEIGHT: 161.82 LBS | BODY MASS INDEX: 26.96 KG/M2 | HEIGHT: 65 IN | HEART RATE: 80 BPM | TEMPERATURE: 99 F | RESPIRATION RATE: 16 BRPM | DIASTOLIC BLOOD PRESSURE: 70 MMHG | SYSTOLIC BLOOD PRESSURE: 120 MMHG | OXYGEN SATURATION: 98 %

## 2022-03-25 LAB
HGB BLD-MCNC: 11.7 G/DL (ref 11.7–15.7)
HOLD SPECIMEN: NORMAL
HOLD SPECIMEN: NORMAL

## 2022-03-25 PROCEDURE — 36415 COLL VENOUS BLD VENIPUNCTURE: CPT

## 2022-03-25 PROCEDURE — 250N000013 HC RX MED GY IP 250 OP 250 PS 637: Performed by: PHYSICIAN ASSISTANT

## 2022-03-25 PROCEDURE — 97530 THERAPEUTIC ACTIVITIES: CPT | Mod: GP | Performed by: PHYSICAL THERAPIST

## 2022-03-25 PROCEDURE — 85018 HEMOGLOBIN: CPT | Performed by: PHYSICIAN ASSISTANT

## 2022-03-25 PROCEDURE — 97161 PT EVAL LOW COMPLEX 20 MIN: CPT | Mod: GP | Performed by: PHYSICAL THERAPIST

## 2022-03-25 PROCEDURE — 97116 GAIT TRAINING THERAPY: CPT | Mod: GP | Performed by: PHYSICAL THERAPIST

## 2022-03-25 PROCEDURE — 97535 SELF CARE MNGMENT TRAINING: CPT | Mod: GO

## 2022-03-25 PROCEDURE — 36415 COLL VENOUS BLD VENIPUNCTURE: CPT | Performed by: PHYSICIAN ASSISTANT

## 2022-03-25 PROCEDURE — 250N000011 HC RX IP 250 OP 636: Performed by: PHYSICIAN ASSISTANT

## 2022-03-25 PROCEDURE — 97165 OT EVAL LOW COMPLEX 30 MIN: CPT | Mod: GO

## 2022-03-25 RX ADMIN — OXYCODONE HYDROCHLORIDE 10 MG: 10 TABLET ORAL at 12:25

## 2022-03-25 RX ADMIN — CEFAZOLIN 1 G: 1 INJECTION, POWDER, FOR SOLUTION INTRAMUSCULAR; INTRAVENOUS at 01:11

## 2022-03-25 RX ADMIN — ACETAMINOPHEN 975 MG: 325 TABLET, FILM COATED ORAL at 09:54

## 2022-03-25 RX ADMIN — CEFAZOLIN 1 G: 1 INJECTION, POWDER, FOR SOLUTION INTRAMUSCULAR; INTRAVENOUS at 08:11

## 2022-03-25 RX ADMIN — OXYCODONE HYDROCHLORIDE 10 MG: 10 TABLET ORAL at 08:10

## 2022-03-25 RX ADMIN — DOCUSATE SODIUM AND SENNOSIDES 1 TABLET: 8.6; 5 TABLET ORAL at 08:10

## 2022-03-25 RX ADMIN — OXYCODONE HYDROCHLORIDE 5 MG: 5 TABLET ORAL at 01:17

## 2022-03-25 RX ADMIN — POLYETHYLENE GLYCOL 3350 17 G: 17 POWDER, FOR SOLUTION ORAL at 08:10

## 2022-03-25 ASSESSMENT — ACTIVITIES OF DAILY LIVING (ADL)
PREVIOUS_RESPONSIBILITIES: MEAL PREP;HOUSEKEEPING;LAUNDRY;SHOPPING;YARDWORK;MEDICATION MANAGEMENT;FINANCES;DRIVING;WORK;CHILD CARE

## 2022-03-25 NOTE — PROGRESS NOTES
"   03/25/22 0915   Quick Adds   Type of Visit Initial Occupational Therapy Evaluation   Living Environment   People in Home spouse;child(deyvi), dependent   Current Living Arrangements house   Home Accessibility stairs to enter home   Number of Stairs, Main Entrance 2   Stair Railings, Main Entrance railings safe and in good condition   Transportation Anticipated family or friend will provide   Living Environment Comments Pt lives with her SO and two kids. Her parents and brother live closeby and are available to assist prn. Pt has walk-in shower with chair, there are grab bars but pt does not know if she can reach from the shower chair. Pt has tall toilets.    Self-Care   Usual Activity Tolerance good   Current Activity Tolerance good   Regular Exercise Yes   Activity/Exercise Type walking   Equipment Currently Used at Home none   Fall history within last six months no   Instrumental Activities of Daily Living (IADL)   Previous Responsibilities meal prep;housekeeping;laundry;shopping;yardwork;medication management;finances;driving;work;   IADL Comments Can have family assist prn   General Information   Onset of Illness/Injury or Date of Surgery 03/24/22   Referring Physician Kelton Sanders MD   Patient/Family Therapy Goal Statement (OT) to go home   Additional Occupational Profile Info/Pertinent History of Current Problem per chart: \"Clau Wilder is a 37 year old female with right knee tenosynovial giant cell tumor.  Now status post right open anterior and posterior synovectomy of the right knee on 3/4 with Dr. Sanders.\"   Existing Precautions/Restrictions brace worn when out of bed   Right Lower Extremity (Weight-bearing Status) weight-bearing as tolerated (WBAT)   General Observations and Info CAM boot when OOB. per pt, able to remove in bed and when bathing   Cognitive Status Examination   Orientation Status orientation to person, place and time   Visual Perception   Visual Impairment/Limitations WNL "   Pain Assessment   Patient Currently in Pain Yes, see Vital Sign flowsheet   Range of Motion Comprehensive   Comment, General Range of Motion BUE WFL   Strength Comprehensive (MMT)   Comment, General Manual Muscle Testing (MMT) Assessment BUE WFL   Bed Mobility   Comment (Bed Mobility) A for R LE   Transfers   Transfer Comments SBA crutches   Activities of Daily Living   BADL Assessment/Intervention lower body dressing   Lower Body Dressing Assessment/Training   Christian Level (Lower Body Dressing) minimum assist (75% patient effort)   Clinical Impression   Criteria for Skilled Therapeutic Interventions Met (OT) Yes, treatment indicated   OT Diagnosis decreased I with ADL   OT Problem List-Impairments impacting ADL activity tolerance impaired;pain;balance   Assessment of Occupational Performance 1-3 Performance Deficits   Identified Performance Deficits dressing, transfers, IADL   Planned Therapy Interventions (OT) ADL retraining;transfer training   Clinical Decision Making Complexity (OT) low complexity   Risk & Benefits of therapy have been explained evaluation/treatment results reviewed;care plan/treatment goals reviewed;risks/benefits reviewed;current/potential barriers reviewed;participants voiced agreement with care plan;participants included;patient;spouse/significant other   OT Discharge Planning   OT Discharge Recommendation (DC Rec) home with assist   OT Rationale for DC Rec Pt moving well, needing A with LE dressing 2/2 pain. has support from family prn   OT Brief overview of current status SBA crutches   Therapy Certification   Start of Care Date 03/25/22   Medical Diagnosis s/p R knee surgery   Total Evaluation Time (Minutes)   Total Evaluation Time (Minutes) 8   OT Goals   Therapy Frequency (OT) One time eval and treatment   OT Predicated Duration/Target Date for Goal Attainment 03/25/22   OT Goals Lower Body Dressing   OT: Lower Body Dressing Minimal assist;within precautions;Goal Met

## 2022-03-25 NOTE — PHARMACY-ADMISSION MEDICATION HISTORY
Admission Medication History Completed by Pharmacy    See [facility] Admission Navigator for allergy information, preferred outpatient pharmacy, prior to admission medications and immunization status.     Medication History Sources:     Patient interview    [facility] [phone]    Changes made to PTA medication list (reason):    Added: None    Deleted: Duplicate lamotrigine order    Changed: None    Additional Information:    None    Prior to Admission medications    Medication Sig Last Dose Taking? Auth Provider   acetaminophen (TYLENOL) 325 MG tablet Take 650-975 mg by mouth every 6 hours as needed for mild pain  3/23/2022 at 0800 Yes Reported, Patient   Fiber Adult Gummies 2 g CHEW Take 1 chew tab by mouth daily 3/23/2022 at 0800 Yes Unknown, Entered By History   FLUoxetine (PROZAC) 10 MG capsule Take 20 mg by mouth every morning  3/24/2022 at 0730 Yes Reported, Patient   ibuprofen (ADVIL/MOTRIN) 200 MG tablet Take 200 mg by mouth every 4 hours as needed for mild pain Past Month Yes Reported, Patient   lamoTRIgine (LAMICTAL) 150 MG tablet Take 300 mg by mouth At Bedtime  3/23/2022 at 2200 Yes Reported, Patient   multivitamin w/minerals (MULTI-VITAMIN) tablet Take 1 tablet by mouth every morning Past Week Yes Reported, Patient   Probiotic Product (PROBIOTIC DAILY PO) Take 1 capsule by mouth daily  3/23/2022 at 0800 Yes Reported, Patient       Date completed: 03/24/22    Medication history completed by:   Lacy Romano, PharmD, BCPS  Clinical Pharmacist

## 2022-03-25 NOTE — DISCHARGE SUMMARY
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  Now status post right open anterior and posterior synovectomy of the right knee on 3/4 with Dr. Sanders.     HOSPITAL COURSE:    The patient was admitted following the above listed procedures for pain control and rehabilitation. Clau Wilder did well post-operatively. Medicine was consulted post operatively to aid in management of medical co-morbidities. The patient received routine nursing cares and at the time of discharge was medically stable. Vital signs were stable throughout admission. The patient is tolerating a regular diet and is voiding spontaneously. All PT/OT goals have been met for safe mobility. Pain is now controlled on oral medications which will be available on discharge. Stool softeners have been used while taking pain medications to help prevent constipation. Clau Wilder is deemed medically safe to discharge.     Antibiotics:  Ancef given periop and 24 hours postop.   DVT prophylaxis:  Mechanical only.   PT Progress:  Has met PT/OT goals for safe mobility.    Pain Meds:  Weaned off all IV pain meds by discharge.  Inpatient Events: No significant events or complications.     PHYSICAL EXAM:    General: NAD, alert and oriented, cooperative with exam.   Cardio: RRR, extremities wwp.   Respiratory: Non-labored breathing.  MSK: Focused examination of RLE: Toes wwp, DP 2+, bcr in all toes. Compartments soft and tolerates passive stretch of toes. +EHL/FHL/GSC/TA with 5/5 strength. SILT SP/DP/Sa/Auguste/T. Dressing  c/d/i. Drain with serosanguinous output,     FOLLOWUP:    Follow up with Dr. Sanders at 2 weeks postoperatively.    Future Appointments   Date Time Provider Department Center   4/11/2022  1:10 PM GEMAOXOSEAS [facility]   4/11/2022  1:30 PM [facility]       Orthopaedic Surgery appointments are at the [facility] ([address] [place] [address]). Call [phone] to schedule a follow-up appointment at this location with your provider.     PLANNED DISCHARGE ORDERS:      Current Discharge Medication List      START taking these medications    Details   hydrOXYzine (ATARAX) 25 MG tablet Take 1 tablet (25 mg) by mouth every 6 hours as needed for itching or anxiety (with pain, moderate pain)  Qty: 30 tablet, Refills: 0    Associated Diagnoses: S/P right knee surgery      oxyCODONE (ROXICODONE) 5 MG tablet Take 1-2 tablets (5-10 mg) by mouth every 3 hours as needed for pain (Moderate to Severe)  Qty: 40 tablet, Refills: 0    Associated Diagnoses: S/P right knee surgery      polyethylene glycol (MIRALAX) 17 g packet Take 17 g by mouth daily  Qty: 7 packet, Refills: 0    Associated Diagnoses: S/P right knee surgery      senna-docusate (SENOKOT-S/PERICOLACE) 8.6-50 MG tablet Take 1-2 tablets by mouth 2 times daily Take while on oral narcotics to prevent or treat constipation.  Qty: 30 tablet, Refills: 0    Comments: While taking narcotics  Associated Diagnoses: S/P right knee surgery         CONTINUE these medications which have CHANGED    Details   acetaminophen (TYLENOL) 325 MG tablet Take 2 tablets (650 mg) by mouth every 4 hours  Qty: 100 tablet, Refills: 0    Associated Diagnoses: S/P right knee surgery         CONTINUE these medications which have NOT CHANGED    Details   Fiber Adult Gummies 2 g CHEW Take 1 chew tab by mouth daily      FLUoxetine (PROZAC) 10 MG capsule Take 20 mg by mouth every morning       ibuprofen (ADVIL/MOTRIN) 200 MG tablet Take 200 mg by  "mouth every 4 hours as needed for mild pain      lamoTRIgine (LAMICTAL) 150 MG tablet Take 300 mg by mouth At Bedtime       multivitamin w/minerals (MULTI-VITAMIN) tablet Take 1 tablet by mouth every morning      Probiotic Product (PROBIOTIC DAILY PO) Take 1 capsule by mouth daily                Discharge Procedure Orders   Reason for your hospital stay   Order Comments: You stayed in the hospital overnight following your surgery     Contact Surgeon Team   Order Comments: You may experience symptoms that require follow-up before your scheduled appointment. Refer to the \"Stoplight Tool\" in your discharge papers for instructions on when to contact Dr. Sanders's office if you are concerned about pain control, blood clots, constipation, or if you are unable to urinate.  You may also contact Dr. Sanders's nurse coordinator, Lis Humphries RN, directly, preferably via [employer] or [phone].    Regular business hours (Monday - Friday, 8am - 5pm):  [facility]: [phone]    After hours and weekends:  [facility] on call Orthopedic resident: [phone]     Orthopedic Urgent Care   Order Comments: If you are not able to reach Dr. Sanders's office and you need immediate care, go to the Orthopedic Urgent Care at your Surgeon's office.  Do NOT go to the Emergency Room unless you have shortness of breath, chest pain, or other signs of a medical emergency.     Call [phone]   Order Comments: Call 911 immediately if you experience sudden-onset chest pain, arm weakness/numbness, slurred speech, or shortness of breath     Breathing exercises   Order Comments: Perform breathing exercises using your Incentive Spirometer 10 times per hour while awake for 2 weeks.     Fever Management   Order Comments: A low grade fever can be expected after surgery.  Use acetaminophen (TYLENOL) as needed for fever management.  Contact your Surgeon Team if you have a fever greater than " 101.5 F, chills, and/or night sweats.     Constipation management   Order Comments: Constipation (hard, dry bowel movements) is expected after surgery due to the combination of being less active, the anesthetic, and the opioid pain medication.  You can do the following to help reduce constipation:  ~  FLUIDS:  Drink clear liquids (water or Gatorade), or juice (apple/prune).  ~  DIET:  Eat a fiber rich diet.    ~  ACTIVITY:  Get up and move around several times a day.  Increase your activity as you are able.  MEDICATIONS:  Reduce the risk of constipation by starting medications before you are constipated.  You can take Miralax   (1 packet as directed) and/or a stool softener (Senokot 1-2 tablets 1-2 times a day).  If you already have constipation and these medications are not working, you can get magnesium citrate and use as directed.  If you continue to have constipation you can try an over the counter suppository or enema.  Call your Surgeon Team if it has been greater than 3 days since your last bowel movement.     Reduced Urine Output   Order Comments: Changes in the amount of fluids you drank before and after surgery may result in problems urinating.  It is important to stay well-hydrated after surgery and drink plenty of water. If it has been greater than 8 hours since you have urinated despite drinking plenty of water, call your Surgeon Team.     Anticoagulation - aspirin   Order Comments: Take the aspirin as prescribed for a total of four weeks after surgery.  This is given to help minimize your risk of blood clot.     Activity - Exercises to prevent blood clots   Order Comments: Unless otherwise directed by your Surgeon team, perform the following exercises at least three times per day for the first four weeks after surgery to prevent blood clots in your legs: 1) Point and flex your feet (Ankle Pumps), 2) Move your ankle around in big circles, 3) Wiggle your toes, 4) Walk, even for short distances, several  "times a day, will help decrease the risk of blood clots.     Order Specific Question Answer Comments   Is discharge order? Yes      Pain after Surgery   Order Comments: Pain after surgery is normal and expected.  You will have some amount of pain for several weeks after surgery.  Your pain will improve with time.  There are several things you can do to help reduce your pain including: rest, ice, elevation, and using pain medications as needed. Contact your Surgeon Team if you have pain that persists or worsens after surgery despite rest, ice, elevation, and taking your medication(s) as prescribed. Contact your Surgeon Team if you have new numbness, tingling, or weakness in your operative extremity.     Swelling after Surgery   Order Comments: Swelling and/or bruising of the surgical extremity is common and may persist for several months after surgery. In addition to frequent icing and elevation, gentle compressive support with an ACE wrap or tubigrip may help with swelling. Apply compression regularly, removing at least twice daily to perform skin checks. Contact your Surgeon Team if your swelling increases and is NOT associated with an increase in your activity level, or if your swelling increases and is associated with redness and pain.     LOWER Extremity Elevation   Order Comments: Swelling is expected for several months after surgery. This type of swelling is usually associated with gravity and activity, and can be improved with elevation.   The best way to do this is to get your \"toes above your nose\" by laying down and placing several pillows lengthwise under your calf and heel. When elevating your leg keep your knee completely straight. Perform this elevation as often as possible especially for the first two weeks after surgery.     Cold therapy   Order Comments: Ice can be used to control swelling and discomfort after surgery. Place a thin towel over your operative site and apply the ice pack overtop. Leave " ice pack in place for 20 minutes, then remove for 20 minutes. Repeat this 20 minutes on/20 minutes off routine as often as tolerated.     acetaminophen (TYLENOL) Instructions   Order Comments: You were discharged with acetaminophen (TYLENOL) for pain management after surgery. Acetaminophen most effectively manages pain symptoms when it is taken on a schedule without missing doses (every four, six, or eight hours). Your Provider will prescribe a safe daily dose between 3000 - 4000 mg.  Do NOT exceed this daily dose. Most patients use acetaminophen for pain control for the first four weeks after surgery.  You can wean from this medication as your pain decreases.     NSAID Instructions   Order Comments: You were discharged with an anti-inflammatory medication for pain management to use in combination with acetaminophen (TYLENOL) and the narcotic pain medication.  Take this medication exactly as directed.  You should only take one anti-inflammatory at a time.  Some common anti-inflammatories include: ibuprofen (ADVIL, MOTRIN), naproxen (ALEVE, NAPROSYN), celecoxib (CELEBREX), meloxicam (MOBIC), ketorolac (TORADOL).  Take this medication with food and water.     Opioids - Tapering Instructions   Order Comments: In the first three days following surgery, your symptoms may warrant use of the narcotic pain medication every four to six hours as prescribed. This is normal. As your pain symptoms improve, focus your efforts on decreasing (tapering) use of narcotic medications. The most successful tapering strategy is to first, decrease the number of tablets you take every 4-6 hours to the minimum prescribed. Then, increase the amount of time between doses.  For example:  First, taper to   or 1 tablet every 4-6 hours.  Then, taper to   or 1 tablet every 6-8 hours.  Then, taper to   or 1 tablet every 8-10 hours.  Then, taper to   or 1 tablet every 10-12 hours.  Then, taper to   or 1 tablet at bedtime.  The bedtime dose can help  with comfort during sleep and is typically the last dose to be discontinued after surgery.     Follow Up Care   Order Comments: You should be seen for a post operative appointment with Dr. Sanders approximately 2 weeks after surgery.  If you want a virtual visit, please check with your physician to see whether or not a virtual visit is acceptable.   To check on your appointment time, either look on your after visit summary (AVS) or look it up in [employer] on your appointment list or call the clinic at the number below.    Your follow up appointments will be at the location that you regularly see Dr. Sanders:    [facility]  [address] [place] [address]  [phone]     Activity - Total Knee Arthroplasty     Order Specific Question Answer Comments   Is discharge order? Yes      Return to Driving   Order Comments: Return to driving - Driving is NOT permitted until directed by your provider. Under no circumstance are you permitted to drive while using narcotic pain medications.     Order Specific Question Answer Comments   Is discharge order? Yes      Continuous Passive Motion Machine   Order Comments: Settings (degrees): 0-90 degrees  Advance CPM (degrees): increments of 5 degrees every 30 minutes.  Perform for 6-8 hours daily for four weeks     Dressing / Wound Care - Wound   Order Comments: You have a clean dressing on your surgical wound. Dressing change instructions as follows: try to keep your dressing on as long as possible, ideally 7 days, if it is still sealed around the incision.  If not, remove and re-apply with gauze and tape until the 7th day after surgery.  Contact your Surgeon Team if you have increased redness, warmth around the surgical wound, and/or drainage from the surgical wound.     Shower with wound/dressing covered   Order Comments: You may shower 7 days after surgery as long as the surgical wound stays dry.  The shower water will cause  the dressing to begin falling off which is acceptable.  Just remove it yourself.     Dressing / Wound Care - NO Tub Bathing   Order Comments: Tub bathing, swimming, or any other activities that will cause your incision to be submerged in water should be avoided until allowed by your Surgeon.     Opioid Instructions (Less than 65 years)   Order Comments: You were discharged with an opioid medication (hydromorphone, oxycodone, hydrocodone, or tramadol). This medication should only be taken for breakthrough pain that is not controlled with acetaminophen (TYLENOL). If you rate your pain less than 3 you do not need this medication.  Pain rating 0-3:  You do not need this medication.  Pain rating 4-6:  Take 1 tablet every 4-6 hours as needed  Pain rating 7-10:  Take 2 tablets every 4-6 hours as needed.  Do not exceed 6 tablets per day     Foot flat weight bearing   Order Comments: Foot flat weight bearing. Leave boot on while up. OK to remove while sleeping, resting and bathing. OK to remove boot for PT and range of motion exercises.     Order Specific Question Answer Comments   Is discharge order? Yes      Continuous Passive Motion Machine   Order Comments: Settings (degrees): 0-90 degrees  Advance CPM (degrees): increments of 5 degrees every 30 minutes.  Perform for 6-8 hours daily for four weeks     Crutches DME   Order Comments: DME Documentation: Describe the reason for need to support medical necessity: Impaired gait status post knee surgery. I, the undersigned, certify that the above prescribed supplies are medically necessary for this patient and is both reasonable and necessary in reference to accepted standards of medical practice in the treatment of this patient's condition and is not prescribed as a convenience.     Order Specific Question Answer Comments   DME Provider: [facility]    Crutch Type: Standard    Crutches Add On: NA    Length of Need: Lifetime      Cane DME   Order Comments: DME Documentation:  Describe the reason for need to support medical necessity: Impaired gait status post knee surgery. I, the undersigned, certify that the above prescribed supplies are medically necessary for this patient and is both reasonable and necessary in reference to accepted standards of medical practice in the treatment of this patient's condition and is not prescribed as a convenience.     Order Specific Question Answer Comments   DME Provider: [facility]    Cane Type: Single Tip    Reminder: Patient can typically get 1 every 5 years      Walker DME   Order Comments: DME Documentation: Describe the reason for need to support medical necessity: Impaired gait status post knee surgery. I, the undersigned, certify that the above prescribed supplies are medically necessary for this patient and is both reasonable and necessary in reference to accepted standards of medical practice in the treatment of this patient's condition and is not prescribed as a convenience.     Order Specific Question Answer Comments   DME Provider: [facility]    Walker Type: Standard (2 Wheel)    Accessories: N/A      Regular Diet Adult     Order Specific Question Answer Comments   Is discharge order? Yes      Assign Questionnaire Series to Patient       DEJA VILLALOBOS PA-C  3/25/2022 11:13 AM   Orthopaedic Surgery

## 2022-03-25 NOTE — PLAN OF CARE
VS: Temp: 98.5  F (36.9  C) Temp src: Oral BP: 110/64 Pulse: 98   Resp: 16 SpO2: 96 % O2 Device: None (Room air) Oxygen Delivery: 2 LPM     O2: 96% on 2LPM via nasal cannula. Denies SOB and CP, no cough present. Lung sounds clear.   Output: Pt voids spontaneously and without difficulty. Pt voided 600ml around 2200 and had a PVR of 10ml. Pt gets up to use the bathroom.   Last BM: 03/21/2022, pt has a history of constipation.   Activity: Assist x1-2 w, gait belt and walker. Pt ambulated 40ft during this shift. Tolerated ambulating for the first time post-op well.   Skin: R) knee/leg incision   Pain: Pain is well controlled with scheduled tylenol and PRN oxycodone 5mg. Pt rates pain at a 3-4/10.   CMS: A & O x4, reports numbness and tingling in surgical leg from spinal   Dressing: CDI   Diet: Regular   LDA: PIV R) hand infusing at 75ml/hr, PIV L) hand saline locked. ROMI drain R) knee, 60ml of output this shift.   Equipment: IV pump/pole, personal belongings, CAM boot, gait belt, walker, call light.   Plan: Discharge tomorrow after working with PT.   Additional Info:

## 2022-03-25 NOTE — PROGRESS NOTES
A/Ox's 4. Pt rated pain as tolerable. Oxycodone and Ice packs given for pain control. Dressing CDI. Pt has a Drain. CAM boot on when getting OOB. Slight numbness RLE, CMS otherwise intact. Tolerated regular diet. Denied any nausea, CP, SOB, lightheadedness or dizziness. Voiding without pain or difficulty. Passing flatus. LBM the 21st. Pt said this is normal for her to go may days with out a BM. Up with assist of one. Resting in bed at this time with call light in reach. Able to make needs known. Continue to monitor.     Patient vital signs are at baseline: Yes  Patient able to ambulate as they were prior to admission or with assist devices provided by therapies during their stay:  Yes  Patient MUST void prior to discharge:  Yes  Patient able to tolerate oral intake:  Yes  Pain has adequate pain control using Oral analgesics:  Yes  Does patient have an identified : Not talked about, Will when patient wakes up    Has goal D/C date and time been discussed with patient:  No,  Reason: Not talked about, will when pt wakes up

## 2022-03-25 NOTE — PLAN OF CARE
[facility]      OUTPATIENT PHYSICAL THERAPY EVALUATION  PLAN OF TREATMENT FOR OUTPATIENT REHABILITATION  (COMPLETE FOR INITIAL CLAIMS ONLY)  Patient's Last Name, First Name, M.I.  YOB: 1985  Clau Wilder                        Provider's Name  [facility] Medical Record No.  [id number]                               Onset Date:  03/24/22   Start of Care Date:  03/25/22      Type:     _X_PT   ___OT   ___SLP Medical Diagnosis:                           PT Diagnosis:      Visits from SOC:  1   _________________________________________________________________________________  Plan of Treatment/Functional Goals    Planned Interventions:       Goals: See Physical Therapy Goals on Care Plan in [employer] electronic health record.    Therapy Frequency: One time eval and treatment only  Predicted Duration of Therapy Intervention: 03/25/22  _________________________________________________________________________________    I CERTIFY THE NEED FOR THESE SERVICES FURNISHED UNDER        THIS PLAN OF TREATMENT AND WHILE UNDER MY CARE     (Physician co-signature of this document indicates review and certification of the therapy plan).              Certification date from: 03/25/22, Certification date to: 03/28/22    Referring Physician: Ron Sanders            Initial Assessment        See Physical Therapy evaluation dated 03/25/22 in Epic electronic health record.

## 2022-03-25 NOTE — PLAN OF CARE
Goal Outcome Evaluation:    VS: VSS, pt denied CP or SOB.   O2: Room air sat. > 90%.   Output: Voiding adequate amount without difficulty.    Last BM: 03/21/22 per pt report.    Activity: Up with SBA and crutches.    Skin: Intact except surgical incision.    Pain: Comfortably manageable with PRN medication.    CMS: CMS and neuro intact.    Dressing: CDI.    Diet: Regular tolerating okay.    LDA: PIV SL.    Equipment: CPM, IV pole, and personal belongings.   Plan: Discharge home today.   Additional Info:        DISCHARGE SUMMARY    Pt discharging to: home  Transportation: Family.   AVS given and discussed: Yes  Stoplight Tool given and discussed: yes  Medications given: yes  Belongings returned: yes  Comments: pt discharged home and pt understand discharge plan.

## 2022-03-25 NOTE — PROGRESS NOTES
03/25/22 0800   Quick Adds   Quick Adds Certification   Type of Visit Initial PT Evaluation   Living Environment   People in Home parent(s);spouse;child(deyvi), dependent   Current Living Arrangements house   Home Accessibility stairs to enter home   Number of Stairs, Main Entrance 2   Stair Railings, Main Entrance railings safe and in good condition   Transportation Anticipated family or friend will provide   Self-Care   Usual Activity Tolerance good   Current Activity Tolerance good   Regular Exercise Yes   Activity/Exercise Type walking   Equipment Currently Used at Home none   Fall history within last six months no   General Information   Onset of Illness/Injury or Date of Surgery 03/24/22   Referring Physician Ron Sanders   Patient/Family Therapy Goals Statement (PT) PLOF   Pertinent History of Current Problem (include personal factors and/or comorbidities that impact the POC) PT is a 37 year old 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   Weight-Bearing Status - RLE weight-bearing as tolerated   General Observations Using air cam boot   Cognition   Affect/Mental Status (Cognition) WFL   Orientation Status (Cognition) oriented x 4   Pain Assessment   Patient Currently in Pain Yes, see Vital Sign flowsheet   Posture    Posture Not impaired   Bed Mobility   Comment, (Bed Mobility) Min A for bed mob   Transfers   Comment, (Transfers) SBA for transfers w a FWW   Gait/Stairs (Locomotion)   [place] Level (Gait) supervision;verbal cues;contact guard   Assistive Device (Gait) walker, front-wheeled   Distance in Feet (Required for LE Total Joints) 150   Pattern (Gait) step-to   Comment, (Gait/Stairs) Pt able to complete 3 stairs using handrails SBA with verbal cues for sequencing - increased pain with having to clear RLE onto step   Balance   Balance no deficits were identified   Sensory Examination   Sensory Perception patient reports no sensory changes   Coordination   Coordination no deficits were identified    Clinical Impression   Criteria for Skilled Therapeutic Intervention Yes, treatment indicated   PT Diagnosis (PT) weakness   Influenced by the following impairments pain   Functional limitations due to impairments gait abnormality   Clinical Presentation (PT Evaluation Complexity) Stable/Uncomplicated   Clinical Presentation Rationale per clinical judgement   Clinical Decision Making (Complexity) low complexity   Risk & Benefits of therapy have been explained evaluation/treatment results reviewed;care plan/treatment goals reviewed;risks/benefits reviewed;current/potential barriers reviewed;participants voiced agreement with care plan;participants included;patient;spouse/significant other   PT Discharge Planning   PT Discharge Recommendation (DC Rec) home with outpatient physical therapy;home with assist   PT Rationale for DC Rec Pt has support at home and is mobilizing safe w FWW    PT Brief overview of current status Min A for bed mob, SBA for transfers, CGA for gait and stairs   Plan of Care Review   Plan of Care Reviewed With patient;spouse   Therapy Certification   Start of care date 03/25/22   Certification date from 03/25/22   Certification date to 03/28/22   Total Evaluation Time   Total Evaluation Time (Minutes) 10   Physical Therapy Goals   PT Frequency One time eval and treatment only   PT Predicated Duration/Target Date for Goal Attainment 03/25/22   Psychosocial Support   Trust Relationship/Rapport care explained;choices provided;emotional support provided;empathic listening provided;questions answered;questions encouraged;reassurance provided;thoughts/feelings acknowledged     Attending MD (Dr. Kelton Sanders) Attestation: I reviewed the therapist's note and approve the plan of care.      MD Richard Taylor Family Professor  Oncology and Adult Reconstructive Surgery  Dept Orthopaedic Surgery, [facility]  [phone] office, [phone] pager  [email or web address]

## 2022-03-26 ENCOUNTER — TELEPHONE (OUTPATIENT)
Dept: OTHER | Facility: CLINIC | Age: 37
End: 2022-03-26
Payer: COMMERCIAL

## 2022-03-26 NOTE — TELEPHONE ENCOUNTER
Brief Orthopedic Note:     Spoke with emergency medicine provider at [facility] who states that the patient presented for acutely worsening of her pain in the anterior knee this morning. There was no trauma or injury. The pain is primarily anterior. She was unable to manage the pain at home. The ED provider stated that she appeared comfortable and the pain was primarily anterior. She was afebrile. The provider noted that the wound was dry and intact anteriorly and posteriorly. She was NVI distally on exam. Her CRP was slightly elevated. Able to look at the images and the wound did not look infected and looked dry and intact.    Discussed with chief resident and recommended that they patient continue with her pain regimen of Scheduled Tylenol, ibuprofen, oxycodone, and Atarax. Recommended adding on Robaxin for pain, which the emergency department provider was able to prescribe. Recommended keeping the follow up appointment in two weeks with Dr. Sanders. Recommended calling the clinic if pain is still not manageable to be seen sooner or present to the ED at [facility].     Alexander Henry MD     Discussed with Patrick Zhang, Chief Resident

## 2022-03-28 ENCOUNTER — TELEPHONE (OUTPATIENT)
Dept: ORTHOPEDICS | Facility: CLINIC | Age: 37
End: 2022-03-28
Payer: COMMERCIAL

## 2022-03-28 NOTE — TELEPHONE ENCOUNTER
Message left for Clau to follow up on her recovery and to determine if she has any questions about her boot.  Encouraged to call back with questions.    MARIO DoN, RN  RN Care Coordinator, Dr. Tommy BENSON [facility] Orth[facility]

## 2022-03-28 NOTE — TELEPHONE ENCOUNTER
Returned call after voicemail left, needed to leave additional voice mail.  Encouraged to call back with further questions.    MARIO DoN, RN  RN Care Coordinator, Dr. Tommy BENSON [facility]

## 2022-03-29 ENCOUNTER — TELEPHONE (OUTPATIENT)
Dept: ORTHOPEDICS | Facility: CLINIC | Age: 37
End: 2022-03-29
Payer: COMMERCIAL

## 2022-03-29 DIAGNOSIS — Z98.890 S/P RIGHT KNEE SURGERY: ICD-10-CM

## 2022-03-29 RX ORDER — OXYCODONE HYDROCHLORIDE 5 MG/1
5-10 TABLET ORAL EVERY 6 HOURS PRN
Qty: 40 TABLET | Refills: 0 | Status: SHIPPED | OUTPATIENT
Start: 2022-03-29 | End: 2023-04-13

## 2022-03-29 NOTE — TELEPHONE ENCOUNTER
Follow up call to Clau to discuss her pain management.  Clau said she is doing considerably better than she was this weekend. She will work to start tapering her oxycodone.  She got her questions answered about her boot at PT yesterday.    All questions answered for now.    MARIO DoN, RN  RN Care Coordinator, Dr. Tommy BENSON [facility]

## 2022-03-29 NOTE — TELEPHONE ENCOUNTER
[facility] Call Center    Phone Message    May a detailed message be left on voicemail: yes     Reason for Call: Order(s): Other: verbal orders   Reason for requested: wants to know weight bearing status & range of motion   Date needed: today  Provider name: Kelton Sanders       Action Taken: Message routed to:  [facility] ortho    Travel Screening: Not Applicable

## 2022-03-29 NOTE — TELEPHONE ENCOUNTER
Called and spoke with [facility] , pt is as following per op note:   Activity: Up with assist and assistive devices as needed until independent. Knee ROM as tolerated. Advance CPM as tolerated to goal of 0 to 90 degrees.  Weight bearing status: WBAT      She has no further questions at this time.

## 2022-04-01 ENCOUNTER — TELEPHONE (OUTPATIENT)
Dept: ORTHOPEDICS | Facility: CLINIC | Age: 37
End: 2022-04-01
Payer: COMMERCIAL

## 2022-04-01 NOTE — TELEPHONE ENCOUNTER
Follow up call to Clau after additional my chart message sent.  Discussed Clau's symptoms. Clau said the ankle swelling is slightly better today.  Encouraged Clau to stay off her leg-elevate toes above her nose- ace wrap to ankle, use of boot.  Assessed over phone potential for DVT.   Calu does not appear to be symptomatic for a DVT currently, instructed what to watch for and when to call Orthopedic resident on call.  Also discussed is symptoms do not improve, to send may chart photos to be assess by team in clinic on Monday.    All questions answered.    MARIO DoN, RN  RN Care Coordinator, Dr. Tommy BENSON [facility]

## 2022-04-04 ENCOUNTER — MEDICAL CORRESPONDENCE (OUTPATIENT)
Dept: HEALTH INFORMATION MANAGEMENT | Facility: CLINIC | Age: 37
End: 2022-04-04
Payer: COMMERCIAL

## 2022-04-11 ENCOUNTER — OFFICE VISIT (OUTPATIENT)
Dept: ORTHOPEDICS | Facility: CLINIC | Age: 37
End: 2022-04-11
Payer: COMMERCIAL

## 2022-04-11 ENCOUNTER — ANCILLARY PROCEDURE (OUTPATIENT)
Dept: GENERAL RADIOLOGY | Facility: CLINIC | Age: 37
End: 2022-04-11
Attending: ORTHOPAEDIC SURGERY
Payer: COMMERCIAL

## 2022-04-11 DIAGNOSIS — M12.20 PVNS (PIGMENTED VILLONODULAR SYNOVITIS): ICD-10-CM

## 2022-04-11 DIAGNOSIS — M12.20 PVNS (PIGMENTED VILLONODULAR SYNOVITIS): Primary | ICD-10-CM

## 2022-04-11 PROCEDURE — 73560 X-RAY EXAM OF KNEE 1 OR 2: CPT | Mod: RT | Performed by: RADIOLOGY

## 2022-04-11 PROCEDURE — 99024 POSTOP FOLLOW-UP VISIT: CPT | Mod: GC | Performed by: ORTHOPAEDIC SURGERY

## 2022-04-11 ASSESSMENT — KOOS JR: HOW SEVERE IS YOUR KNEE STIFFNESS AFTER FIRST WAKING IN MORNING: MODERATE

## 2022-04-11 ASSESSMENT — ACTIVITIES OF DAILY LIVING (ADL): FUNCTION,_DAILY_LIVING_SCORE: 72.06

## 2022-04-11 NOTE — LETTER
4/11/2022         RE: Clau Wilder  [address]  [place] [address]        Dear Colleague,    Thank you for referring your patient, Clau Wilder, to the [facility] [place]. Please see a copy of my visit note below.        [facility]  Orthopaedic Surgery, Joint Replacement Consultation  by Kelton Sanders M.D.    Clau Wilder MRN# [id number]   Age: 37 year old YOB: 1985     Requesting physician: Alyssa Verdugo       Background:   DX:  1. Right knee atraumatic effusion and pain, May 2021  2. TGCT R knee     TREATMENTS:  1. 12/9/21 MRI of right knee demonstrating Synovitis with multiple masslike areas of  2. synovial proliferation with associated susceptibility artifact.  Findings highly suspicious for PVNS.  3. 2/18/2022, 'scope biopsy R knee (Tommy) [facility]  4.  3/24/2022, Right knee anterior/ posterior synovectomy (Tommy) [facility]         Assessment and Plan:   Assessment:  37-year-old female returns status post anterior and posterior synovectomy of the right knee on 3/24/2022 with Dr. Sanders.        Plan:  1.  Continue weight bearing as tolerated on the right lower extremity, progress and wean off crutch assistance.    2.  Continue use of walking boot on RLE until 6 weeks post op to limit active plantarflexion and allow healing of the medial and lateral heads of the gastrocnemius.   3.  Continue aggressive physical therapy for ROM of the right knee.   -Obtain full extension to at least 90+ degrees flexion.  Active quadriceps strengthening.   4.  F/U in 1 month postoperatively, if range of motion is not significantly improved or she reaches a plateau we will recommend proceeding with manipulation under anesthesia of the right knee.              History of Present Illness:   37 year old female who is approximately 2 weeks status post right knee anterior and posterior synovectomy for pigmented villonodular synovitis with Dr. Sanders.  She  is been doing well after surgery, states her knee feels significantly improved from how it did immediately after surgery.  Still having some pain and difficulty with range of motion however she is working with physical therapy.  She is been weightbearing as tolerated however still using a crutch for assistance with ambulation and has continued to wear the boot to limit active plantarflexion.  She endorses some burning pain in the lateral aspect of her foot.          Physical Exam:     EXAMINATION pertinent findings:   PSYCH: Pleasant, healthy-appearing, alert, oriented x3, cooperative. Normal mood and affect.  VITAL SIGNS: Last menstrual period 08/23/2016, not currently breastfeeding..  Reviewed nursing intake notes.   There is no height or weight on file to calculate BMI.  RESP: non labored breathing   ABD: benign, soft, non-tender, no acute peritoneal findings  SKIN: grossly normal   LYMPHATIC: grossly normal, no adenopathy, no extremity edema  NEURO: grossly normal , no motor deficits  VASCULAR: satisfactory perfusion of all extremities   MUSCULOSKELETAL:   Anterior and posterior incisions are cassidy, dry and intact. Healing satisfactorily.  ROM from 20 to 70 degrees flexion passively.  Burning sensation endorsed in lateral sural cutaneous distribution along the lateral foot.  Otherwise sensory intact to light touch in sural, saphenous, superficial peroneal, deep peroneal and tibial nerve distributions.  Motor intact EHL/FHL/dorsiflexion/plantarflexion.  Digits warm and well-perfused.         Data:   All laboratory data reviewed  All imaging studies reviewed by me    4/11/2022 2 views right knee: Multiviews of the right knee demonstrate maintained joint spaces without evidence of degenerative changes.  There is no joint space narrowing, subchondral sclerosis or evidence of acute fracture dislocation.    Lemuel Negrete DO  Adult Joint Reconstruction Fellow  Dept Orthopaedic Surgery, [facility]      Attending  MD (Dr. Kelton Sanders) Attestation :  This patient was seen and evaluated by me including a history, exam, and interpretation of all imaging and/or lab data.  Either a training physician (resident/fellow), who also saw the patient, or scribe has documented the visit in the attached note.    MD Richard Taylor Family Professor  Oncology and Adult Reconstructive Surgery  Dept Orthopaedic Surgery, [facility]  [phone] office, [phone] pager  [email or web address]          DATA for DOCUMENTATION:         Past Medical History:     Patient Active Problem List   Diagnosis     Endometrium, polyp     Excessive bleeding in premenopausal period     Chronic pain of right knee     S/P right knee surgery     Past Medical History:   Diagnosis Date     Calculi, ureter      Depressive disorder      IUD migration     perforation     Mental disorder        Also see scanned health assessment forms.       Past Surgical History:     Past Surgical History:   Procedure Laterality Date     ARTHROSCOPY KNEE Right 2022    Procedure: Athroscopic synovial biopsy Right knee;  Surgeon: Kelton Sanders MD;  Location: [facility] OR      SECTION       [id number] with stone removal       HYSTERECTOMY VAGINAL, BILATERAL SALPINGO-OOPHERECTOMY, COMBINED       HYSTEROSCOPY DIAGNOSTIC  2016     LAPAROSCOPY OPERATIVE ADULT  2016    removal of ectopic IUD     ORTHOPEDIC SURGERY      tendon repair ankle     AR HAND/FINGER SURGERY UNLISTED  10/06/2021     AR STOMACH SURGERY PROCEDURE UNLISTED      , IUD retrieval     SYNOVECTOMY KNEE Right 3/24/2022    Procedure: Open anterior synovectomy Right knee;  Surgeon: Kelton Sanders MD;  Location: [facility] OR     SYNOVECTOMY KNEE POSTERIOR Right 3/24/2022    Procedure: Open posterior synovectomy Right knee;  Surgeon: Kelton Sanders MD;  Location: [facility] OR            Social History:     Social History     Socioeconomic History     Marital status:      Spouse  name: Not on file     Number of children: 1     Years of education: Not on file     Highest education level: Not on file   Occupational History     Not on file   Tobacco Use     Smoking status: Never Smoker     Smokeless tobacco: Never Used   Vaping Use     Vaping Use: Never used   Substance and Sexual Activity     Alcohol use: Yes     Comment: occ.     Drug use: No     Sexual activity: Yes     Partners: Male     Birth control/protection: Female Surgical   Other Topics Concern     Parent/sibling w/ CABG, MI or angioplasty before 65F 55M? 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[employer]     Social Determinants of Health     Financial Resource Strain: Not on file   Food Insecurity: Not on file   Transportation Needs: Not on file   Physical Activity: Not on file   Stress: Not on file   Social Connections: Not on file   Intimate Partner Violence: Not on file   Housing Stability: Not on file            Family History:       Family History   Problem Relation Age of Onset     Thyroid Cancer Mother      Other Cancer Mother         Thyroid cancer     Lupus Maternal Grandmother      Aneurysm Paternal Grandfather      Anesthesia Reaction No family hx of      Deep Vein Thrombosis (DVT) No family hx of             Medications:     Current Outpatient Medications   Medication Sig     acetaminophen (TYLENOL) 325 MG tablet Take 2 tablets (650 mg) by mouth every 4 hours     Fiber Adult Gummies 2 g CHEW Take 1 chew tab by mouth daily     FLUoxetine (PROZAC) 10 MG capsule Take 20 mg by mouth every morning      hydrOXYzine (ATARAX) 25 MG tablet Take 1 tablet (25 mg) by mouth every 6 hours as needed for itching or anxiety (with pain, moderate pain)     ibuprofen (ADVIL/MOTRIN) 200 MG tablet Take 200 mg by mouth every 4 hours as needed for mild pain     lamoTRIgine (LAMICTAL) 150 MG tablet Take 300 mg by mouth At Bedtime      multivitamin w/minerals (THERA-VIT-M) tablet Take 1 tablet by mouth every  morning     oxyCODONE (ROXICODONE) 5 MG tablet Take 1-2 tablets (5-10 mg) by mouth every 6 hours as needed for pain or moderate to severe pain (Moderate to Severe)     polyethylene glycol (MIRALAX) 17 g packet Take 17 g by mouth daily     Probiotic Product (PROBIOTIC DAILY PO) Take 1 capsule by mouth daily      senna-docusate (SENOKOT-S/PERICOLACE) 8.6-50 MG tablet Take 1-2 tablets by mouth 2 times daily Take while on oral narcotics to prevent or treat constipation.     No current facility-administered medications for this visit.              Review of Systems:   A comprehensive 10 point review of systems (constitutional, ENT, cardiac, peripheral vascular, lymphatic, respiratory, GI, , Musculoskeletal, skin, Neurological) was performed and found to be negative except as described in this note.     See intake form completed by patient

## 2022-04-11 NOTE — PROGRESS NOTES
[facility]  Orthopaedic Surgery, Joint Replacement Consultation  by Kelton Sanders M.D.    Clau Wilder MRN# [id number]   Age: 37 year old YOB: 1985     Requesting physician: Alyssa Verdugo       Background:   DX:  1. Right knee atraumatic effusion and pain, May 2021  2. TGCT R knee     TREATMENTS:  1. 12/9/21 MRI of right knee demonstrating Synovitis with multiple masslike areas of  2. synovial proliferation with associated susceptibility artifact.  Findings highly suspicious for PVNS.  3. 2/18/2022, 'scope biopsy R knee (Tommy) [facility]  4.  3/24/2022, Right knee anterior/ posterior synovectomy (Tommy) [facility]         Assessment and Plan:   Assessment:  37-year-old female returns status post anterior and posterior synovectomy of the right knee on 3/24/2022 with Dr. Sanders.        Plan:  1.  Continue weight bearing as tolerated on the right lower extremity, progress and wean off crutch assistance.    2.  Continue use of walking boot on RLE until 6 weeks post op to limit active plantarflexion and allow healing of the medial and lateral heads of the gastrocnemius.   3.  Continue aggressive physical therapy for ROM of the right knee.   -Obtain full extension to at least 90+ degrees flexion.  Active quadriceps strengthening.   4.  F/U in 1 month postoperatively, if range of motion is not significantly improved or she reaches a plateau we will recommend proceeding with manipulation under anesthesia of the right knee.              History of Present Illness:   37 year old female who is approximately 2 weeks status post right knee anterior and posterior synovectomy for pigmented villonodular synovitis with Dr. Sanders.  She is been doing well after surgery, states her knee feels significantly improved from how it did immediately after surgery.  Still having some pain and difficulty with range of motion however she is working with physical therapy.  She is been weightbearing as  tolerated however still using a crutch for assistance with ambulation and has continued to wear the boot to limit active plantarflexion.  She endorses some burning pain in the lateral aspect of her foot.          Physical Exam:     EXAMINATION pertinent findings:   PSYCH: Pleasant, healthy-appearing, alert, oriented x3, cooperative. Normal mood and affect.  VITAL SIGNS: Last menstrual period 08/23/2016, not currently breastfeeding..  Reviewed nursing intake notes.   There is no height or weight on file to calculate BMI.  RESP: non labored breathing   ABD: benign, soft, non-tender, no acute peritoneal findings  SKIN: grossly normal   LYMPHATIC: grossly normal, no adenopathy, no extremity edema  NEURO: grossly normal , no motor deficits  VASCULAR: satisfactory perfusion of all extremities   MUSCULOSKELETAL:   Anterior and posterior incisions are cassidy, dry and intact. Healing satisfactorily.  ROM from 20 to 70 degrees flexion passively.  Burning sensation endorsed in lateral sural cutaneous distribution along the lateral foot.  Otherwise sensory intact to light touch in sural, saphenous, superficial peroneal, deep peroneal and tibial nerve distributions.  Motor intact EHL/FHL/dorsiflexion/plantarflexion.  Digits warm and well-perfused.         Data:   All laboratory data reviewed  All imaging studies reviewed by me    4/11/2022 2 views right knee: Multiviews of the right knee demonstrate maintained joint spaces without evidence of degenerative changes.  There is no joint space narrowing, subchondral sclerosis or evidence of acute fracture dislocation.    Lemuel Negrete DO  Adult Joint Reconstruction Fellow  Dept Orthopaedic Surgery, [facility]      Attending MD (Dr. Kelton Sanders) Attestation :  This patient was seen and evaluated by me including a history, exam, and interpretation of all imaging and/or lab data.  Either a training physician (resident/fellow), who also saw the patient, or scribe has documented the  visit in the attached note.    MD Richard Taylor Family Professor  Oncology and Adult Reconstructive Surgery  Dept Orthopaedic Surgery, [facility]  [phone] office, [phone] pager  [email or web address]          DATA for DOCUMENTATION:         Past Medical History:     Patient Active Problem List   Diagnosis     Endometrium, polyp     Excessive bleeding in premenopausal period     Chronic pain of right knee     S/P right knee surgery     Past Medical History:   Diagnosis Date     Calculi, ureter      Depressive disorder      IUD migration     perforation     Mental disorder        Also see scanned health assessment forms.       Past Surgical History:     Past Surgical History:   Procedure Laterality Date     ARTHROSCOPY KNEE Right 2022    Procedure: Athroscopic synovial biopsy Right knee;  Surgeon: Kelton Sanders MD;  Location: [facility] OR      SECTION       [id number] with stone removal       HYSTERECTOMY VAGINAL, BILATERAL SALPINGO-OOPHERECTOMY, COMBINED       HYSTEROSCOPY DIAGNOSTIC  2016     LAPAROSCOPY OPERATIVE ADULT  2016    removal of ectopic IUD     ORTHOPEDIC SURGERY      tendon repair ankle     OR HAND/FINGER SURGERY UNLISTED  10/06/2021     OR STOMACH SURGERY PROCEDURE UNLISTED      , IUD retrieval     SYNOVECTOMY KNEE Right 3/24/2022    Procedure: Open anterior synovectomy Right knee;  Surgeon: Kelton Sanders MD;  Location: [facility] OR     SYNOVECTOMY KNEE POSTERIOR Right 3/24/2022    Procedure: Open posterior synovectomy Right knee;  Surgeon: Kelton Sanders MD;  Location: [facility] OR            Social History:     Social History     Socioeconomic History     Marital status:      Spouse name: Not on file     Number of children: 1     Years of education: Not on file     Highest education level: Not on file   Occupational History     Not on file   Tobacco Use     Smoking status: Never Smoker     Smokeless tobacco: Never Used   Vaping Use      Vaping Use: Never used   Substance and Sexual Activity     Alcohol use: Yes     Comment: occ.     Drug use: No     Sexual activity: Yes     Partners: Male     Birth control/protection: Female Surgical   Other Topics Concern     Parent/sibling w/ CABG, MI or angioplasty before 65F 55M? 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[facility]     Social Determinants of Health     Financial Resource Strain: Not on file   Food Insecurity: Not on file   Transportation Needs: Not on file   Physical Activity: Not on file   Stress: Not on file   Social Connections: Not on file   Intimate Partner Violence: Not on file   Housing Stability: Not on file            Family History:       Family History   Problem Relation Age of Onset     Thyroid Cancer Mother      Other Cancer Mother         Thyroid cancer     Lupus Maternal Grandmother      Aneurysm Paternal Grandfather      Anesthesia Reaction No family hx of      Deep Vein Thrombosis (DVT) No family hx of             Medications:     Current Outpatient Medications   Medication Sig     acetaminophen (TYLENOL) 325 MG tablet Take 2 tablets (650 mg) by mouth every 4 hours     Fiber Adult Gummies 2 g CHEW Take 1 chew tab by mouth daily     FLUoxetine (PROZAC) 10 MG capsule Take 20 mg by mouth every morning      hydrOXYzine (ATARAX) 25 MG tablet Take 1 tablet (25 mg) by mouth every 6 hours as needed for itching or anxiety (with pain, moderate pain)     ibuprofen (ADVIL/MOTRIN) 200 MG tablet Take 200 mg by mouth every 4 hours as needed for mild pain     lamoTRIgine (LAMICTAL) 150 MG tablet Take 300 mg by mouth At Bedtime      multivitamin w/minerals (THERA-VIT-M) tablet Take 1 tablet by mouth every morning     oxyCODONE (ROXICODONE) 5 MG tablet Take 1-2 tablets (5-10 mg) by mouth every 6 hours as needed for pain or moderate to severe pain (Moderate to Severe)     polyethylene glycol (MIRALAX) 17 g packet Take 17 g by mouth daily     Probiotic Product  (PROBIOTIC DAILY PO) Take 1 capsule by mouth daily      senna-docusate (SENOKOT-S/PERICOLACE) 8.6-50 MG tablet Take 1-2 tablets by mouth 2 times daily Take while on oral narcotics to prevent or treat constipation.     No current facility-administered medications for this visit.              Review of Systems:   A comprehensive 10 point review of systems (constitutional, ENT, cardiac, peripheral vascular, lymphatic, respiratory, GI, , Musculoskeletal, skin, Neurological) was performed and found to be negative except as described in this note.     See intake form completed by patient

## 2022-04-13 NOTE — OP NOTE
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    PROCEDURE PERFORMED:    1.  Open anterior complete synovectomy, right knee joint.  2.  Open posterior capsular and synovectomy of right knee joint.  3.  Popliteal artery and vein dissection.  4.  Neuroplasty of the tibial nerve and peroneal nerve posterior right knee joint.    SURGEON:  Kelton Sanders MD    ASSISTANT:  Uriah Jenkins PA-C (no qualified orthopedic trainee resident or fellow was available to assist with the surgery and therefore, the assistance of a physician assistant was required).    INDICATIONS:  This 37-year-old female had a biopsy-proven tenosynovial giant cell tumor of her right knee joint and was symptomatic.  She had diffuse involvement of the anterior and posterior aspects of the knee joint and this procedure was indicated.    DESCRIPTION OF PROCEDURE:  The patient was placed on the operating table in supine position.  Tourniquet placed about the right thigh.  After induction of general anesthesia, sterile prep and drape, a midline incision over the knee was then taken down through the subcutaneous tissues and an anteromedial arthrotomy was undertaken.  The synovium was thickened and boggy and enlarged and I dissected this from the underlying quadriceps tendon and quadriceps muscle and from the genu interarticularis muscle.  This was sharply dissected with a knife en bloc excision of the entire suprapatellar region was undertaken.  I continued the dissection en bloc through the synovium within the medial gutter as well as the lateral gutter.  I reflected the mass from the anterior surface of the femur, as well as from the quadriceps tendon insertion on the undersurface of the patella.  Once the entire mass of the synovium was excised.  I sent to the pathology lab for evaluation.  I  inspected the area to ensure that no remaining synovial tissue was present.    I then inspected the anterior aspect of the knee joint.  Care was taken to avoid injury to the menisci or the cruciate ligaments.  The ligamentum mucosum was taken down.  I inspected the medial and lateral compartments.  The synovial reflection along the anterior fat pad was now resected sharply.  I then used rongeurs to a pulse and remove any synovial tissue that had the appearance of the neoplastic disease from within the intercondylar notch anteriorly.  I then used a nerve hook to look underneath the menisci and the medial and lateral gutters along the joint line in order to ensure complete resection.  Once this was completed, the wound was inspected.  Hemostasis was secured and the anterior arthrotomy incision was then closed in interrupted fashion with figure-of-eight sutures.  Layered closure for the subcutaneous tissue and the skin was accomplished.    The patient was now turned prone on the operating room table and induced standard prep and drape was undertaken.  This was a completely separate procedure with the patient placed in the prone position through a separate incision.    A curvilinear S-shaped incision was now taken down through the skin and subcutaneous tissues.  The tourniquet was reinflated after deeming deflated during the change over from the previous surgery.    I incised through the skin and subcutaneous tissue and  the popliteal investing fascia sharply.  I then raised flaps and identified the peroneal nerve coursing directly beneath the biceps tendon.  I then carefully dissected and performed a neuroplasty by dissecting along the epineurial sheath of the peroneal nerve.  I continued the dissection of the peroneal nerve in order to mobilize it and displaced it anteriorly.    I followed this back to the origin of the tibial nerve.  I now decompressed and performed a neuroplasty of the tibial nerve as  well.    The tibial nerve was handled in similar fashion by dissecting along the epineurial nerve sheath and mobilizing for sufficient distance in order to allow sufficient displacement and retraction without undue tension.  Once this was completed, I then focused my attention on the popliteal vessels.    I dissected within the adipose tissue of the popliteal fossa until the vein, popliteal vein, and the popliteal artery were encountered.  I then mobilized the structures from the surrounding tissues in order to accomplish sufficient access in order to gain sufficient access to the posterior capsule of the knee joint.    At this point, I then dissected the posterior aspect of the medial compartment.  I then dissected around both portions of the medial and lateral head of gastrocnemius muscle.  These were tagged with Ethibond sutures.  I then used electrocautery to take down the muscles at the level of the insertion into the posterior condyle of the femur.  A small stump with remaining tissue was retained in order to allow for reapproximation and reconstruction at a later time point.    After reflecting the gastrocnemius muscle heads both medially and laterally, I then was able to identify the posterior axilla of the knee joint.  In order to mobilize the vessel sufficiently multiple geniculate branches had to be divided and ligated between silk sutures.  Once accomplished, I then incised the posterior capsule of the knee joint with electrocautery.  Care was taken to avoid any injury to the posterior cruciate ligament.  I dissected along within the intercondylar notch and oblique popliteal ligament, as well as the posterior capsule of the knee and the posterior synovial tissues were completely excised.  I continued to dissect along this area and dissected along the posterior condyle.  Care was taken to avoid injury to the chondral surface of the posterior condyle.  Posterior arthrotomy was then undertaken and this was  accomplished, and I dissected within the intercondylar notch and avulsed any synovial tissue from within this area that appeared neoplastic.  Finally, the capsular and synovial tissue were completely resected from the posterior aspect of the knee joint.  This was sent to the pathologist for examination.  Hemostasis was secured.  The tourniquet was deflated in order to ensure that no damage was done to the arterial dissection.    It should be noted that prior to the capsulotomy.  I did mobilize the popliteal artery and vein by dissecting the genicular branches from the mass and isolating the popliteal artery and vein itself.  These were then protected with vascular slings.  These were used to mobilize and move the neurovascular bundle, medially and laterally in order to gain access to the different compartments of the posterior knee joint.    All of the synovial tissue was removed now from the posterior aspect of the knee joint and I checked the medial and lateral gutters as well.  Once this was completed, hemostasis was secured as noted above.  A drain was placed in the posterior aspect of the wound and brought out through a distal stab wound incision.      The reconstruction was now performed.  The medial and lateral heads of the gastrocnemius muscle had their tendinous portions reattached to the insertion on the posterior aspect of the femoral condyle.  This was secured with multiple figure-of-eight sutures in a Junior-type suture.  With the knee in full extension.  The ankle could be dorsiflexed to 90 degrees without undue tension on the reconstructed end of the gastrocnemius muscle heads.    Having accomplished this, the wound was then thoroughly irrigated.  Tourniquet was deflated.  I then reopposed the popliteal investing fascia with interrupted figure-of-eight 2-0 PDS sutures.  The subcutaneous tissue was reapproximated in similar manner as well as the skin with 3-0 PDS suture.    POSTOPERATIVE PLAN:  The  patient will be fully weightbearing as tolerated on her heel only.  A walking boot will be utilized to prevent undue retraction of the gastrocnemius muscle tear for eight weeks after surgery.  Full range of motion of the knee and tibiotalar joint will be allowed.    Kelton Sanders MD        D: 2022   T: 2022   MT: [id number]    Name:     CASEY MARROQUIN  MRN:      [id number]        Account:        [id number]   :      1985           Procedure Date: 2022     Document: [id number]

## 2022-04-19 ENCOUNTER — TELEPHONE (OUTPATIENT)
Dept: ORTHOPEDICS | Facility: CLINIC | Age: 37
End: 2022-04-19
Payer: COMMERCIAL

## 2022-04-19 NOTE — TELEPHONE ENCOUNTER
[facility] Call Center    Phone Message    May a detailed message be left on voicemail: yes     Reason for Call: Form or Letter   Type or form/letter needing completion: letter for work  Provider: Dr. Sanders  Date form needed: today  Once completed: Fax form to: Email letter to [email or web address].    Patient recent had surgery and needs a letter stating she can work from home starting tomorrow.    Please reach out to patient if questions.    Action Taken: Message routed to:  [facility]o    Travel Screening: Not Applicable

## 2022-04-22 ENCOUNTER — TELEPHONE (OUTPATIENT)
Dept: ORTHOPEDICS | Facility: CLINIC | Age: 37
End: 2022-04-22
Payer: COMMERCIAL

## 2022-04-22 NOTE — TELEPHONE ENCOUNTER
[facility] Call [facility]    Phone Message    May a detailed message be left on voicemail: yes     Reason for Call: Other: Patient is available for a call back anytime now until 2-2:30 (she has PT appt at that time )      Action Taken: Message routed to:  [facility] ortho    Travel Screening: Not Applicable     Patient would like Anne to call back

## 2022-04-22 NOTE — TELEPHONE ENCOUNTER
RN called and left a voice message to call us back to talk about her request for work from home .  See 04-19-22 encounter for information           Reason for Call: Form or Letter   Type or form/letter needing completion: letter for work  Provider: Dr. Sanders  Date form needed: today  Once completed: Fax form to: Email letter to [email or web address].     Patient recent had surgery and needs a letter stating she can work from home starting tomorrow.     Please reach out to patient if questions.

## 2022-04-25 ENCOUNTER — TELEPHONE (OUTPATIENT)
Dept: ORTHOPEDICS | Facility: CLINIC | Age: 37
End: 2022-04-25
Payer: COMMERCIAL

## 2022-04-25 NOTE — TELEPHONE ENCOUNTER
Called farooq back to follow up on her call earlier. She was inquiring about her work from home letter. Writer saw she had previously spoken with one of our RN's and will draft this letter and send it out to her email. Patient had no further questions.    Ferny Luna, EMT on 4/25/2022 at 12:53 PM

## 2022-04-25 NOTE — TELEPHONE ENCOUNTER
[facility] Call Center    Phone Message    May a detailed message be left on voicemail: no     Reason for Call: Other: Requesting c/b from Dr Sanders's care team-didn't want to say what it was regarding      Action Taken: Message routed to:  [facility]    Travel Screening: Not Applicable

## 2022-04-26 NOTE — TELEPHONE ENCOUNTER
ATC called and spoke to pt. Pt stated she was called on 4/25/22 and had no questions.  All questions were answered. No further action was needed by ATC. ATC thanked pt for her time.     Ashley Samuel ATC

## 2022-05-09 ENCOUNTER — OFFICE VISIT (OUTPATIENT)
Dept: ORTHOPEDICS | Facility: CLINIC | Age: 37
End: 2022-05-09
Payer: COMMERCIAL

## 2022-05-09 DIAGNOSIS — M12.20 PVNS (PIGMENTED VILLONODULAR SYNOVITIS): Primary | ICD-10-CM

## 2022-05-09 DIAGNOSIS — Z98.890 S/P RIGHT KNEE SURGERY: ICD-10-CM

## 2022-05-09 PROCEDURE — 99024 POSTOP FOLLOW-UP VISIT: CPT | Performed by: ORTHOPAEDIC SURGERY

## 2022-05-09 NOTE — LETTER
5/9/2022         RE: Clau Wilder  [address]  [place] [address]        Dear Colleague,    Thank you for referring your patient, Clau Wilder, to the [facility]. Please see a copy of my visit note below.        [facility]  Orthopaedic Surgery, Joint Replacement Consultation  by Kelton Sanders M.D.    Clau Wilder MRN# [id number]   Age: 37 year old YOB: 1985     Requesting physician: Alyssa Verdugo       Background:   DX:  1. Right knee atraumatic effusion and pain, May 2021  2. TGCT R knee     TREATMENTS:  1. 12/9/21 MRI of right knee demonstrating Synovitis with multiple masslike areas of  2. synovial proliferation with associated susceptibility artifact.  Findings highly suspicious for PVNS.  3. 2/18/2022, 'scope biopsy R knee (Tommy) [facility]  4.  3/24/2022, Right knee anterior/ posterior synovectomy (Tommy) [facility]    Clau returns for postop check 6 weeks after the above-mentioned procedure.  She is doing well and final pathology confirms presence of tenosynovial giant cell tumor.    Examination demonstrates nicely healed incision anteriorly and posteriorly.  It is clean and dry without drainage.  No erythema and minimal swelling.    She has full active extension of the knee joint can further flex to 95 degrees.    Tibiotalar motion is normal.  No pain with dorsiflexion of 10 degrees.    Impression:  Excellent outcome to date and achieving targets with rehabilitation.    Plan:  Progress to full weightbearing as tolerated gradually over the next several weeks using a cane.  She may return to work and drive as tolerated in the next 2 weeks time.  Continue with quadricep strengthening and range of motion excises.  We discussed cycling and aquatic kicking exercises as appropriate.    Follow-up in 3 months time with MRI examination of her knee before hand.    Kelton Sanders MD  [facility] Family Professor  Oncology and Adult  Reconstructive Surgery  Dept Orthopaedic Surgery, [facility]  [phone] office, [phone] pager  [email or web address]

## 2022-05-09 NOTE — LETTER
Return to Work  May 9, 2022     Seen today: yes    Patient:  Clau Wilder  :   1985  MRN:     [id number]  Physician: SONYA REINA    Clau Wilder may return to work when she is able to ambulate independently without any crutches.  I would anticipate that she could do this in the next 2 weeks.  She may need to return on a gradual basis perhaps working 50% time, (4 hrs/day) and gradually increase to full time schedule as tolerated.          Electronically signed by Sonya Reina MD

## 2022-05-09 NOTE — PROGRESS NOTES
[facility]  Orthopaedic Surgery, Joint Replacement Consultation  by Kelton Sanders M.D.    Clau Wilder MRN# [id number]   Age: 37 year old YOB: 1985     Requesting physician: Alyssa Verdugo       Background:   DX:  1. Right knee atraumatic effusion and pain, May 2021  2. TGCT R knee     TREATMENTS:  1. 12/9/21 MRI of right knee demonstrating Synovitis with multiple masslike areas of  2. synovial proliferation with associated susceptibility artifact.  Findings highly suspicious for PVNS.  3. 2/18/2022, 'scope biopsy R knee (Tommy) [facility]  4.  3/24/2022, Right knee anterior/ posterior synovectomy (Tommy) [facility]    Clau returns for postop check 6 weeks after the above-mentioned procedure.  She is doing well and final pathology confirms presence of tenosynovial giant cell tumor.    Examination demonstrates nicely healed incision anteriorly and posteriorly.  It is clean and dry without drainage.  No erythema and minimal swelling.    She has full active extension of the knee joint can further flex to 95 degrees.    Tibiotalar motion is normal.  No pain with dorsiflexion of 10 degrees.    Impression:  Excellent outcome to date and achieving targets with rehabilitation.    Plan:  Progress to full weightbearing as tolerated gradually over the next several weeks using a cane.  She may return to work and drive as tolerated in the next 2 weeks time.  Continue with quadricep strengthening and range of motion excises.  We discussed cycling and aquatic kicking exercises as appropriate.    Follow-up in 3 months time with MRI examination of her knee before hand.    MD Richard Taylor Family Professor  Oncology and Adult Reconstructive Surgery  Dept Orthopaedic Surgery, [facility]  [phone] office, [phone] pager  [email or web address]

## 2022-07-11 ENCOUNTER — E-VISIT (OUTPATIENT)
Dept: URGENT CARE | Facility: URGENT CARE | Age: 37
End: 2022-07-11
Payer: COMMERCIAL

## 2022-07-11 DIAGNOSIS — J02.9 SORE THROAT: ICD-10-CM

## 2022-07-11 DIAGNOSIS — Z20.822 SUSPECTED COVID-19 VIRUS INFECTION: ICD-10-CM

## 2022-07-11 PROCEDURE — 99421 OL DIG E/M SVC 5-10 MIN: CPT | Performed by: PHYSICIAN ASSISTANT

## 2022-07-11 NOTE — PATIENT INSTRUCTIONS
Dear Clau,    Your symptoms show that you may have coronavirus (COVID-19).     Because you also reported sore throat, I would like to also test you for Strep Throat to determine if we need to treat you for that as well.    What should I do?  We would like to test you for COVID-19 virus and Strep Throat. I have placed orders for these tests.   To schedule: go to your [employer] home page and scroll down to the section that says  You have an appointment that needs to be scheduled  and click the large green button that says  Schedule Now  and follow the steps to find the next available openings. It is important that when you are asked what the reason for your appointment is that you mention you need BOTH COVID and Strep tests.    If you are unable to complete these [employer] scheduling steps, please call [phone] to schedule your testing.     These guidelines are for isolating before returning to work, school or .     For employers, schools and day cares: This is an official notice for this person s medical guidelines for returning in-person.     For health care sites: The CDC gives different isolation and quarantine guidelines for healthcare sites, please check with these sites before arriving.     How do I self-isolate?  You isolate when you have symptoms of COVID or a test shows you have COVID, even if you don t have symptoms.     If you DO have symptoms:  o Stay home and away from others  - For at least 5 days after your symptoms started, AND   - You are fever free for 24 hours (with no medicine that reduces fever), AND  - Your other symptoms are better.  o Wear a mask for 10 full days any time you are around others.    If you DON T have symptoms:  o Stay at home and away from others for at least 5 days after your positive test.  o Wear a mask for 10 full days any time you are around others.    How can I take care of myself?  Over the counter medications may help with your symptoms such as runny or stuffy  nose, cough, chills, or fever. Talk to your care team about your options.   Some people are at high risk of severe illness (for example, you have a weak immune system, you re 65 years or older, or you have certain medical problems). If your risk is high and your symptoms started in the last 5 to 7 days, we strongly recommend for you to get COVID treatment as soon as possible. Paxlovid, Molnupiravir and the monoclonal antibody treatments are proven safe and effective, make you feel better faster, and prevent hospitalization and death.       To schedule an appointment to discuss COVID treatment, request an appointment on [employer] (select  COVID-19 Treatment ) or call [employer] ([phone]), press 7.    Get lots of rest. Drink extra fluids (unless a doctor has told you not to)    Take Tylenol (acetaminophen) or ibuprofen for fever or pain. If you have liver or kidney problems, ask your family doctor if it's okay to take Tylenol or ibuprofen    Take over the counter medications for your symptoms, as directed by your doctor. You may also talk to your pharmacist.      If you have other health problems (like cancer, heart failure, an organ transplant or severe kidney disease): Call your specialty clinic if you don't feel better in the next 2 days.    Know when to call 911. Emergency warning signs include:  o Trouble breathing or shortness of breath  o Pain or pressure in the chest that doesn't go away  o Feeling confused like you haven't felt before, or not being able to wake up  o Bluish-colored lips or face    Where can I get more information?    [facility] - About COVID-19: [email or web address]     CDC - What to Do If You're Sick: https://www.cdc.gov/coronavirus/2019-ncov/if-you-are-sick/index.html     CDC - Quarantine & Isolation: https://www.cdc.gov/coronavirus/2019-ncov/your-health/quarantine-isolation.html     [facility] clinical trials (COVID-19 research studies):  clinicalaffairs.[facility]-clinical-trials    Below are the COVID-19 hotlines at the [place] Department of Health ([facility]). Interpreters are available.  o For health questions: Call 390-450-4476 or 1-516.792.9200 (7 a.m. to 7 p.m.)  o For questions about schools and childcare: Call 763-139-4611 or 1-513.139.5193 (7 a.m. to 7 p.m.)  July 11, 2022  RE:  Clau Wilder                                                                                                                  [address]  [place] [facility] [place] [address]      To whom it may concern:    I evaluated Clau Wilder on July 11, 2022. Clau Wilder should be excused from work/school.     They should let their workplace manager and staffing office know when their quarantine ends.    We can not give an exact date as it depends on the information below. They can calculate this on their own or work with their manager/staffing office to calculate this. (For example if they were exposed on 10/04, they would have to quarantine for 14 full days. That would be through 10/18. They could return on 10/19.)    Quarantine Guidelines:    If patient receives a positive COVID-19 test result, they should follow the guidance of those who are giving the results. Usually the return to work is 10 (or in some cases 20 days from symptom onset.) If they work at [employer], they must be cleared by Employee Occupational Health and Safety to return to work.      If patient receives a negative COVID-19 test result and did not have a high risk exposure to someone with a known positive COVID-19 test, they can return to work once they're free of fever for 24 hours without fever-reducing medication and their symptoms are improving or resolved.    If patient receives a negative COVID-19 test and if they had a high risk exposure to someone who has tested positive for COVID-19 then they can return to work 14 days after their last contact with the positive individual    Note: If there is  ongoing exposure to the covid positive person, this quarantine period may be longer than 14 days. (For example, if they are continually exposed to their child, who tested positive and cannot isolate from them, then the quarantine of 7-14 days can't start until their child is no longer contagious. This is typically 10 days from onset to the child's symptoms. So the total duration may be 17-24 days in this case.)     Sincerely,  Sal Feliciano, [facility], PA-C          o

## 2022-11-03 ENCOUNTER — TELEPHONE (OUTPATIENT)
Dept: ORTHOPEDICS | Facility: CLINIC | Age: 37
End: 2022-11-03

## 2022-11-03 NOTE — TELEPHONE ENCOUNTER
Attending note:  Peer to peer discussion with physician from [place] completed today for MRI examination of this patient's knee.  Authorization was granted for performing MRI examination for post tumor excision surveillance.  In future, it would be important to send office notes along to the insurance, to justify examination so [id number]-[id number] review is not necessary.    Authorization code [id number]    Kelton Sanders MD [facility] Family Professor  Oncology and Adult Reconstructive Surgery  Dept Orthopaedic Surgery, [facility]  [phone] office, [phone] pager  [email or web address]

## 2022-11-09 ENCOUNTER — HOSPITAL ENCOUNTER (OUTPATIENT)
Dept: MRI IMAGING | Facility: CLINIC | Age: 37
Discharge: HOME | End: 2022-11-09
Attending: ORTHOPAEDIC SURGERY | Admitting: ORTHOPAEDIC SURGERY
Payer: COMMERCIAL

## 2022-11-09 DIAGNOSIS — M12.20 PVNS (PIGMENTED VILLONODULAR SYNOVITIS): ICD-10-CM

## 2022-11-09 DIAGNOSIS — Z98.890 S/P RIGHT KNEE SURGERY: ICD-10-CM

## 2022-11-09 PROCEDURE — 73721 MRI JNT OF LWR EXTRE W/O DYE: CPT | Mod: 26 | Performed by: RADIOLOGY

## 2022-11-09 PROCEDURE — 73721 MRI JNT OF LWR EXTRE W/O DYE: CPT | Mod: RT

## 2022-11-10 ENCOUNTER — OFFICE VISIT (OUTPATIENT)
Dept: ORTHOPEDICS | Facility: CLINIC | Age: 37
End: 2022-11-10
Payer: COMMERCIAL

## 2022-11-10 VITALS — HEIGHT: 65 IN | BODY MASS INDEX: 26.66 KG/M2 | WEIGHT: 160 LBS

## 2022-11-10 DIAGNOSIS — D48.19 TENOSYNOVIAL GIANT CELL TUMOR OF KNEE: Primary | ICD-10-CM

## 2022-11-10 PROCEDURE — 99213 OFFICE O/P EST LOW 20 MIN: CPT | Performed by: ORTHOPAEDIC SURGERY

## 2022-11-10 ASSESSMENT — KOOS JR: HOW SEVERE IS YOUR KNEE STIFFNESS AFTER FIRST WAKING IN MORNING: MILD

## 2022-11-10 ASSESSMENT — ACTIVITIES OF DAILY LIVING (ADL): FUNCTION,_DAILY_LIVING_SCORE: 92.65

## 2022-11-10 NOTE — LETTER
11/10/2022         RE: Clau Wilder  [address]  [place] [address]        Dear Colleague,    Thank you for referring your patient, Clau Wilder, to the [facility] [place]. Please see a copy of my visit note below.        [facility]  Orthopaedic Surgery, Joint Replacement Consultation  by Kelton Sanders M.D.    Clau Wilder MRN# [id number]   Age: 37 year old YOB: 1985     Requesting physician: Alyssa Verdugo       Background:   DX:  1. Right knee atraumatic effusion and pain, May 2021  2. TGCT R knee     TREATMENTS:  1. 12/9/21 MRI of right knee demonstrating Synovitis with multiple masslike areas of  2. synovial proliferation with associated susceptibility artifact.  Findings highly suspicious for PVNS.  3. 2/18/2022, 'scope biopsy R knee (Tommy) [facility]  4.  3/24/2022, Right knee anterior/ posterior synovectomy (Tommy) [facility]      Clau returns 7 months after the above-mentioned procedure and is doing well.  She has had excellent relief of her preoperative symptoms.  Denies any swelling or discomfort of the right knee.    Examination reveals no effusion.  Patella is not ballotable.  Collateral and cruciate ligaments are stable.  She has excellent function of her ankle plantarflexion and dorsiflexion.  Knee range of motion 0 to 120 degrees    MRI is notable for small area beneath the lateral meniscus of concern for possible residual disease.  No other findings concerning for recurrence or residual disease.    Impression and plan:  Excellent outcome after above-mentioned surgery.    Plan to continue monitoring with surveillance MRI examinations of her knee at her next 1 year follow-up visit.  Gradient echo sequence will be obtained as well.      MD Richard Taylor Family Professor  Oncology and Adult Reconstructive Surgery  Dept Orthopaedic Surgery, [facility]  [phone] office, [phone]  pager  [email or web address]    Total combined visit time and work time before and after clinic visit = 20 min

## 2022-11-10 NOTE — NURSING NOTE
"Chief Complaint   Patient presents with     RECHECK     Follow up MRI results right knee       37 year old  1985    Ht 1.651 m (5' 5\")   Wt 72.6 kg (160 lb)   LMP 2016   BMI 26.63 kg/m      Past Surgical History:   Procedure Laterality Date     ARTHROSCOPY KNEE Right 2022    Procedure: Athroscopic synovial biopsy Right knee;  Surgeon: Kelton Sanders MD;  Location: [facility] OR      SECTION       [id number] with stone removal       HYSTERECTOMY VAGINAL, BILATERAL SALPINGO-OOPHERECTOMY, COMBINED       HYSTEROSCOPY DIAGNOSTIC  2016     LAPAROSCOPY OPERATIVE ADULT  2016    removal of ectopic IUD     ORTHOPEDIC SURGERY      tendon repair ankle     MT HAND/FINGER SURGERY UNLISTED  10/06/2021     MT STOMACH SURGERY PROCEDURE UNLISTED      , IUD retrieval     SYNOVECTOMY KNEE Right 3/24/2022    Procedure: Open anterior synovectomy Right knee;  Surgeon: Kelton Sanders MD;  Location: [facility] OR     SYNOVECTOMY KNEE POSTERIOR Right 3/24/2022    Procedure: Open posterior synovectomy Right knee;  Surgeon: Kelton Sanders MD;  Location: [facility] OR             Pain Assessment  Patient Currently in Pain: SqueezeCMM. [address]        Allergies   Allergen Reactions     Erythromycin Nausea and Vomiting           Current Outpatient Medications   Medication     Fiber Adult Gummies 2 g CHEW     FLUoxetine (PROZAC) 10 MG capsule     ibuprofen (ADVIL/MOTRIN) 200 MG tablet     lamoTRIgine (LAMICTAL) 150 MG tablet     multivitamin w/minerals (THERA-VIT-M) tablet     Probiotic Product (PROBIOTIC DAILY PO)     acetaminophen (TYLENOL) 325 MG tablet     hydrOXYzine (ATARAX) 25 MG tablet     oxyCODONE (ROXICODONE) 5 MG tablet     polyethylene glycol (MIRALAX) 17 g packet     senna-docusate (SENOKOT-S/PERICOLACE) 8.6-50 MG tablet     No current facility-administered medications for this visit. "             Questionnaires:      KOOS Knee Survey Assessment    Knee Outcome Survey ADL Scale (Vanesa, ROSMERY; RUSS Schafer; Brianna, RS; Dru, FH; Jewell, BHAVANA; 1998) 11/10/2022   Do you have swelling in your knee? Rarely   Do you feel grinding, hear clicking or any other type of noise when your knee moves? Never   Does your knee catch or hang up when moving? Never   Can you straighten your knee fully? Always   Can you bend your knee fully? Often   How severe is your knee joint stiffness after first wakening in the morning? Mild   How severe is your knee stiffness after sitting, lying or resting LATER IN THE DAY? Mild   How often do you experience knee pain? Weekly   Twisting/pivoting on your knee Mild   Straightening knee fully None   Bending knee fully Mild   Walking on flat surface None   Going up or down stairs Mild   At night while in bed None   Sitting or lying Mild   Standing upright None   Descending stairs Mild   Ascending stairs Mild   Rising from sitting Mild   Standing Mild   Bending to floor/ an object None   Walking on flat surface None   Getting in/out of car None   Going shopping None   Putting on socks/stockings None   Rising from bed None   Taking off socks/stockings None   Lying in bed (turning over, maintaining knee position) None   Getting in/out of bath None   Sitting None   Getting on/off toilet None   Heavy domestic duties (moving heavy boxes, scrubbing floors, etc) Mild   Light domestic duties (cooking, dusting, etc) None   Squatting Mild   Running Mild   Jumping Mild   Twisting/pivoting on your injured knee None   Kneeling Mild   How often are you aware of your knee problem? Daily   Have you modified your life style to avoid potentially damaging activities to your knee? Not at all   How much are you troubled with lack of confidence in your knee? Not at all   In general, how much difficulty do you have with your knee? Mild   Pain Score 83.33   Symptoms Score 64.29   Function, Daily  Living Score 92.65   Sports and Rec Score 80   Quality of Life Score 75              Promis 10 Assessment    PROMIS 10 11/10/2022   In general, would you say your health is: Very good   In general, would you say your quality of life is: Very good   In general, how would you rate your physical health? Good   In general, how would you rate your mental health, including your mood and your ability to think? Good   In general, how would you rate your satisfaction with your social activities and relationships? Very good   In general, please rate how well you carry out your usual social activities and roles Very good   To what extent are you able to carry out your everyday physical activities such as walking, climbing stairs, carrying groceries, or moving a chair? Completely   How often have you been bothered by emotional problems such as feeling anxious, depressed or irritable? Never   How would you rate your fatigue on average? Mild   How would you rate your pain on average?   0 = No Pain  to  10 = Worst Imaginable Pain 0   In general, would you say your health is: 4   In general, would you say your quality of life is: 4   In general, how would you rate your physical health? 3   In general, how would you rate your mental health, including your mood and your ability to think? 3   In general, how would you rate your satisfaction with your social activities and relationships? 4   In general, please rate how well you carry out your usual social activities and roles. (This includes activities at home, at work and in your community, and responsibilities as a parent, child, spouse, employee, friend, etc.) 4   To what extent are you able to carry out your everyday physical activities such as walking, climbing stairs, carrying groceries, or moving a chair? 5   In the past 7 days, how often have you been bothered by emotional problems such as feeling anxious, depressed, or irritable? 1   In the past 7 days, how would you rate your  fatigue on average? 2   In the past 7 days, how would you rate your pain on average, where 0 means no pain, and 10 means worst imaginable pain? 0   Global Mental Health Score 16   Global Physical Health Score 17   PROMIS TOTAL - SUBSCORES 33   Some recent data might be hidden

## 2022-11-10 NOTE — PROGRESS NOTES
[facility]  Orthopaedic Surgery, Joint Replacement Consultation  by Kelton Sanders M.D.    Clau Wilder MRN# [id number]   Age: 37 year old YOB: 1985     Requesting physician: Alyssa Verdugo       Background:   DX:  1. Right knee atraumatic effusion and pain, May 2021  2. TGCT R knee     TREATMENTS:  1. 12/9/21 MRI of right knee demonstrating Synovitis with multiple masslike areas of  2. synovial proliferation with associated susceptibility artifact.  Findings highly suspicious for PVNS.  3. 2/18/2022, 'scope biopsy R knee (Tommy) [facility]  4.  3/24/2022, Right knee anterior/ posterior synovectomy (Tommy) [facility]      Clau returns 7 months after the above-mentioned procedure and is doing well.  She has had excellent relief of her preoperative symptoms.  Denies any swelling or discomfort of the right knee.    Examination reveals no effusion.  Patella is not ballotable.  Collateral and cruciate ligaments are stable.  She has excellent function of her ankle plantarflexion and dorsiflexion.  Knee range of motion 0 to 120 degrees    MRI is notable for small area beneath the lateral meniscus of concern for possible residual disease.  No other findings concerning for recurrence or residual disease.    Impression and plan:  Excellent outcome after above-mentioned surgery.    Plan to continue monitoring with surveillance MRI examinations of her knee at her next 1 year follow-up visit.  Gradient echo sequence will be obtained as well.      MD Richard Taylor Family Professor  Oncology and Adult Reconstructive Surgery  Dept Orthopaedic Surgery, [facility]  [phone] office, [phone] pager  [email or web address]    Total combined visit time and work time before and after clinic visit = 20 min

## 2023-01-07 ENCOUNTER — HEALTH MAINTENANCE LETTER (OUTPATIENT)
Age: 38
End: 2023-01-07

## 2023-04-13 ENCOUNTER — ANCILLARY PROCEDURE (OUTPATIENT)
Dept: MRI IMAGING | Facility: CLINIC | Age: 38
End: 2023-04-13
Attending: ORTHOPAEDIC SURGERY
Payer: COMMERCIAL

## 2023-04-13 ENCOUNTER — OFFICE VISIT (OUTPATIENT)
Dept: ORTHOPEDICS | Facility: CLINIC | Age: 38
End: 2023-04-13
Payer: COMMERCIAL

## 2023-04-13 DIAGNOSIS — D48.19 TENOSYNOVIAL GIANT CELL TUMOR OF KNEE: ICD-10-CM

## 2023-04-13 DIAGNOSIS — D48.19 TENOSYNOVIAL GIANT CELL TUMOR OF KNEE: Primary | ICD-10-CM

## 2023-04-13 PROCEDURE — 99214 OFFICE O/P EST MOD 30 MIN: CPT | Performed by: ORTHOPAEDIC SURGERY

## 2023-04-13 PROCEDURE — 73721 MRI JNT OF LWR EXTRE W/O DYE: CPT | Mod: RT | Performed by: RADIOLOGY

## 2023-04-13 NOTE — NURSING NOTE
Chief Complaint   Patient presents with     RECHECK     MRI results        38 year old  1985    LMP 2016     Past Surgical History:   Procedure Laterality Date     ARTHROSCOPY KNEE Right 2022    Procedure: Athroscopic synovial biopsy Right knee;  Surgeon: Kelton Sanders MD;  [facility]      SECTION       [id number] with stone removal       HYSTERECTOMY VAGINAL, BILATERAL SALPINGO-OOPHERECTOMY, COMBINED       HYSTEROSCOPY DIAGNOSTIC  2016     LAPAROSCOPY OPERATIVE ADULT  2016    removal of ectopic IUD     ORTHOPEDIC SURGERY      tendon repair ankle     VA HAND/FINGER SURGERY UNLISTED  10/06/2021     VA STOMACH SURGERY PROCEDURE UNLISTED      , IUD retrieval     SYNOVECTOMY KNEE Right 3/24/2022    Procedure: Open anterior synovectomy Right knee;  Surgeon: Kelton Sanders MD;  [facility]     SYNOVECTOMY KNEE POSTERIOR Right 3/24/2022    Procedure: Open posterior synovectomy Right knee;  Surgeon: Kelton Sanders MD;  [facility]              Pain Assessment  Patient Currently in Pain: Yes  0-10 Pain Scale: 2  Primary Pain Location: Knee (Right)                           [employer] [address]        Allergies   Allergen Reactions     Erythromycin Nausea and Vomiting           Current Outpatient Medications   Medication     Fiber Adult Gummies 2 g CHEW     FLUoxetine (PROZAC) 10 MG capsule     lamoTRIgine (LAMICTAL) 150 MG tablet     multivitamin w/minerals (THERA-VIT-M) tablet     Probiotic Product (PROBIOTIC DAILY PO)     acetaminophen (TYLENOL) 325 MG tablet     ibuprofen (ADVIL/MOTRIN) 200 MG tablet     polyethylene glycol (MIRALAX) 17 g packet     No current facility-administered medications for this visit.             Questionnaires:    HOOS Hip Dysfunction & Osteoarthritis Outcome Questionnaire         View : No data to display.                       KOOS Knee Survey Assessment        11/10/2022     10:12 AM   Knee Outcome Survey ADL Scale (Vanesa, JJ; Hanh, L; Brianna, RS; Dru, FH; Jewell, CD; 1998)   Do you have swelling in your knee? Rarely   Do you feel grinding, hear clicking or any other type of noise when your knee moves? Never   Does your knee catch or hang up when moving? Never   Can you straighten your knee fully? Always   Can you bend your knee fully? Often   How severe is your knee joint stiffness after first wakening in the morning? Mild   How severe is your knee stiffness after sitting, lying or resting LATER IN THE DAY? Mild   How often do you experience knee pain? Weekly   Twisting/pivoting on your knee Mild   Straightening knee fully None   Bending knee fully Mild   Walking on flat surface None   Going up or down stairs Mild   At night while in bed None   Sitting or lying Mild   Standing upright None   Descending stairs Mild   Ascending stairs Mild   Rising from sitting Mild   Standing Mild   Bending to floor/ an object None   Walking on flat surface None   Getting in/out of car None   Going shopping None   Putting on socks/stockings None   Rising from bed None   Taking off socks/stockings None   Lying in bed (turning over, maintaining knee position) None   Getting in/out of bath None   Sitting None   Getting on/off toilet None   Heavy domestic duties (moving heavy boxes, scrubbing floors, etc) Mild   Light domestic duties (cooking, dusting, etc) None   Squatting Mild   Running Mild   Jumping Mild   Twisting/pivoting on your injured knee None   Kneeling Mild   How often are you aware of your knee problem? Daily   Have you modified your life style to avoid potentially damaging activities to your knee? Not at all   How much are you troubled with lack of confidence in your knee? Not at all   In general, how much difficulty do you have with your knee? Mild   Pain Score 83.33   Symptoms Score 64.29   Function, Daily Living Score 92.65   Sports and Rec Score 80   Quality of Life  Score 75              Promis 10 Assessment        4/13/2023     9:11 AM   PROMIS 10   In general, would you say your health is: Very good   In general, would you say your quality of life is: Very good   In general, how would you rate your physical health? Good   In general, how would you rate your mental health, including your mood and your ability to think? Very good   In general, how would you rate your satisfaction with your social activities and relationships? Excellent   In general, please rate how well you carry out your usual social activities and roles Very good   To what extent are you able to carry out your everyday physical activities such as walking, climbing stairs, carrying groceries, or moving a chair? Completely   In the past 7 days, how often have you been bothered by emotional problems such as feeling anxious, depressed, or irritable? Never   In the past 7 days, how would you rate your fatigue on average? Mild   In the past 7 days, how would you rate your pain on average, where 0 means no pain, and 10 means worst imaginable pain? 1   In general, would you say your health is: 4   In general, would you say your quality of life is: 4   In general, how would you rate your physical health? 3   In general, how would you rate your mental health, including your mood and your ability to think? 4   In general, how would you rate your satisfaction with your social activities and relationships? 5   In general, please rate how well you carry out your usual social activities and roles. (This includes activities at home, at work and in your community, and responsibilities as a parent, child, spouse, employee, friend, etc.) 4   To what extent are you able to carry out your everyday physical activities such as walking, climbing stairs, carrying groceries, or moving a chair? 5   In the past 7 days, how often have you been bothered by emotional problems such as feeling anxious, depressed, or irritable? 1   In the  past 7 days, how would you rate your fatigue on average? 2   In the past 7 days, how would you rate your pain on average, where 0 means no pain, and 10 means worst imaginable pain? 1   Global Mental Health Score 18   Global Physical Health Score 16   PROMIS TOTAL - SUBSCORES 34              Ortho [place] Knee Questionnaire         View : No data to display.

## 2023-04-13 NOTE — PROGRESS NOTES
[facility]  Orthopaedic Surgery, Joint Replacement Consultation  by Kelton Sanders M.D.    Clau Wilder MRN# [id number]   Age: 37 year old YOB: 1985     Requesting physician: Alyssa Verdugo       Background:   DX:  1. Right knee atraumatic effusion and pain, May 2021  2. TGCT R knee     TREATMENTS:  1. 12/9/21 MRI of right knee demonstrating Synovitis with multiple masslike areas of  2. synovial proliferation with associated susceptibility artifact.  Findings highly suspicious for PVNS.  3. 2/18/2022, 'scope biopsy R knee (Tommy) [facility]  4.  3/24/2022, Right knee anterior/ posterior synovectomy (Tommy) [facility]        Clau returns for recheck in regards to her right knee joint status post anterior posterior synovectomy.  She reports she is doing well and much better than her preoperative state.  However she does note some intermittent swelling of the knee joint.  It is not severe enough to warrant any analgesic medication and she is able perform all regular activities.    Examination reveals no effusion today.  No warmth.  Full active extension and further flexion to 135 degrees symmetric with the contralateral side.  Her gait is normal.  Collateral cruciate ligaments are without laxity.    MRI examination as noted below and shows areas of increased effusion and more areas of disease in my opinion just medial to the posterior cruciate ligament as well as the posterior aspect of the lateral compartment tibial plateau.              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    Plan:  I discussed with the patient the above findings and my opinions about the likelihood of having residual or recurrent disease present.  Control may be difficult with subsequent additional surgical resection.  Options for treatment consist of (1) serial observation with MRI and no intervention, (2) repeat surgical  excision with posterior synovectomy and arthroscopic examination of anterior compartment, (3) medical therapy with nonsurgical management using either pexidartinib (Turalio) or other tyrosine kinase inhibitor with some anti-CSF 1 activity such as Gleevac.    After discussing these issues with the patient, she would like to observe for the present time as she is minimally symptomatic.  Therefore I will see her in 6 months time with repeat MRI examination.  Additional information was given to her regarding medical therapy using pexidartinib.  We discussed the nature of the medication, pros and cons along with risk benefits and alternatives.    MD Richard Taylor Family Professor  Oncology and Adult Reconstructive Surgery  Dept Orthopaedic Surgery, [facility]  [phone] office, [phone] pager  [email or web address]    Total combined visit time and work time before and after clinic visit on encounter date = 30 min

## 2023-04-13 NOTE — LETTER
4/13/2023         RE: Clau Wilder  [address]  [place] [place] [facility] [place] [address]        Dear Colleague,    Thank you for referring your patient, Clau Wilder, to the [facility] [place]. Please see a copy of my visit note below.        [facility]  Orthopaedic Surgery, Joint Replacement Consultation  by Kelton Sanders M.D.    Clau Wilder MRN# [id number]   Age: 37 year old YOB: 1985     Requesting physician: Alyssa Verdugo       Background:   DX:  Right knee atraumatic effusion and pain, May 2021  TGCT R knee     TREATMENTS:  12/9/21 MRI of right knee demonstrating Synovitis with multiple masslike areas of  synovial proliferation with associated susceptibility artifact.  Findings highly suspicious for PVNS.  2/18/2022, 'scope biopsy R knee (Tommy) [facility]  4.  3/24/2022, Right knee anterior/ posterior synovectomy (Tommy) [facility]        Clau returns for recheck in regards to her right knee joint status post anterior posterior synovectomy.  She reports she is doing well and much better than her preoperative state.  However she does note some intermittent swelling of the knee joint.  It is not severe enough to warrant any analgesic medication and she is able perform all regular activities.    Examination reveals no effusion today.  No warmth.  Full active extension and further flexion to 135 degrees symmetric with the contralateral side.  Her gait is normal.  Collateral cruciate ligaments are without laxity.    MRI examination as noted below and shows areas of increased effusion and more areas of disease in my opinion just medial to the posterior cruciate ligament as well as the posterior aspect of the lateral compartment tibial plateau.              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    Plan:  I discussed with the patient the above findings and my opinions  about the likelihood of having residual or recurrent disease present.  Control may be difficult with subsequent additional surgical resection.  Options for treatment consist of (1) serial observation with MRI and no intervention, (2) repeat surgical excision with posterior synovectomy and arthroscopic examination of anterior compartment, (3) medical therapy with nonsurgical management using either pexidartinib (Turalio) or other tyrosine kinase inhibitor with some anti-CSF 1 activity such as Gleevac.    After discussing these issues with the patient, she would like to observe for the present time as she is minimally symptomatic.  Therefore I will see her in 6 months time with repeat MRI examination.  Additional information was given to her regarding medical therapy using pexidartinib.  We discussed the nature of the medication, pros and cons along with risk benefits and alternatives.    Kelton Sandesr MD  [facility] Family Professor  Oncology and Adult Reconstructive Surgery  Dept Orthopaedic Surgery, [facility]  [phone] office, [phone] pager  [email or web address]    Total combined visit time and work time before and after clinic visit on encounter date = 30 min

## 2023-04-22 ENCOUNTER — HEALTH MAINTENANCE LETTER (OUTPATIENT)
Age: 38
End: 2023-04-22

## 2023-05-19 NOTE — PROGRESS NOTES
Clau is a 36 year old who is being evaluated via a billable video visit.      How would you like to obtain your AVS? MyChart    HPI         Review of Systems         Objective    Vitals - Patient Reported  Pain Score: Mild Pain (2)        Physical Exam   MARILU Magallon LPN       normal... well appearing and in no apparent distress.

## 2023-07-19 ENCOUNTER — TELEPHONE (OUTPATIENT)
Dept: OBGYN | Facility: CLINIC | Age: 38
End: 2023-07-19

## 2023-07-19 ENCOUNTER — OFFICE VISIT (OUTPATIENT)
Dept: OBGYN | Facility: CLINIC | Age: 38
End: 2023-07-19
Payer: COMMERCIAL

## 2023-07-19 VITALS
TEMPERATURE: 98.4 F | DIASTOLIC BLOOD PRESSURE: 80 MMHG | HEART RATE: 76 BPM | HEIGHT: 65 IN | SYSTOLIC BLOOD PRESSURE: 128 MMHG | RESPIRATION RATE: 16 BRPM | BODY MASS INDEX: 29.37 KG/M2 | WEIGHT: 176.3 LBS

## 2023-07-19 DIAGNOSIS — N83.201 CYST OF RIGHT OVARY: Primary | ICD-10-CM

## 2023-07-19 LAB — CEA SERPL-MCNC: 1 NG/ML

## 2023-07-19 PROCEDURE — 36415 COLL VENOUS BLD VENIPUNCTURE: CPT | Performed by: OBSTETRICS & GYNECOLOGY

## 2023-07-19 PROCEDURE — 99204 OFFICE O/P NEW MOD 45 MIN: CPT | Performed by: OBSTETRICS & GYNECOLOGY

## 2023-07-19 PROCEDURE — 82378 CARCINOEMBRYONIC ANTIGEN: CPT | Performed by: OBSTETRICS & GYNECOLOGY

## 2023-07-19 PROCEDURE — 86304 IMMUNOASSAY TUMOR CA 125: CPT | Performed by: OBSTETRICS & GYNECOLOGY

## 2023-07-19 NOTE — TELEPHONE ENCOUNTER
"[id number]  Clau Wilder    You are now scheduled for surgery at [facility].  Below are the details for your surgery.  Please read the \"Preparing for Your Surgery\" instructions and let us know if you have any questions.    Type of surgery: Laparoscopy  Right oophorectomy   Surgeon:  Godfrey Dasilva MD  Location of surgery: [facility] OR    Date of surgery: 7/31/23    Time: 7:30 am   Arrival Time:  6:30 am    Time can change, to be confirmed a couple of days prior by pre-op surgery nurse.     Pre-Op Appt Date: Patient to schedule with a PCP or Family Practice Provider within 30 days to the surgery. Pt will schedule.  Post-Op Appt Date: 9/5/23   Time: 11:30 am    Packet sent out: Yes  Pre-cert/Authorization completed:  TBD by Financial Securing Office.   MA Sterilization/Hysterectomy Acknowledgment Consent signed: No    [facility]  [phone]    Fax: [phone]  Same Day Surgery [phone]  Fax: [phone]  [facility] [phone]    "

## 2023-07-19 NOTE — PROGRESS NOTES
CC:  Consult from herself  For a symptomatic Right ovarian mass seen in December  HPI:  Clau Wilder is a 38 year old female is a   .  Patient's last menstrual period was 2016.  She is s/p hysterectomy for benign disease.  She has had pelvic pain, nausea and radiation of the pain to the right anterior thigh  She had a CT and an ULTRASOUND in 2022 but there was not a gynecologist available in [facility]   She had a normal Ca-125 in       Patients records are available and reviewed at today's visit.    Past GYN history:  No STD history       Last PAP smear:  Normal    Past Medical History:   Diagnosis Date    Calculi, ureter     Depressive disorder     IUD migration     perforation    Mental disorder        Past Surgical History:   Procedure Laterality Date    ARTHROSCOPY KNEE Right 2022    Procedure: Athroscopic synovial biopsy Right knee;  Surgeon: Kelton Sanders MD;  [facility]     SECTION      [id number] with stone removal      HYSTERECTOMY VAGINAL, BILATERAL SALPINGO-OOPHERECTOMY, COMBINED      HYSTEROSCOPY DIAGNOSTIC  2016    LAPAROSCOPY OPERATIVE ADULT  2016    removal of ectopic IUD    ORTHOPEDIC SURGERY      tendon repair ankle    NE HAND/FINGER SURGERY UNLISTED  10/06/2021    NE STOMACH SURGERY PROCEDURE UNLISTED      , IUD retrieval    SYNOVECTOMY KNEE Right 3/24/2022    Procedure: Open anterior synovectomy Right knee;  Surgeon: Kelton Sanders MD;  [facility]    SYNOVECTOMY KNEE POSTERIOR Right 3/24/2022    Procedure: Open posterior synovectomy Right knee;  Surgeon: Kelton Sanders MD;  [facility]       Family History   Problem Relation Age of Onset    Thyroid Cancer Mother     Other Cancer Mother         Thyroid cancer    Lupus Maternal Grandmother     Aneurysm Paternal Grandfather     Anesthesia Reaction No family hx of     Deep Vein Thrombosis (DVT) No family hx of        Allergies: Erythromycin    Current  "Outpatient Medications   Medication Sig Dispense Refill    FLUoxetine (PROZAC) 10 MG capsule Take 20 mg by mouth every morning       lamoTRIgine (LAMICTAL) 150 MG tablet Take 300 mg by mouth At Bedtime       multivitamin w/minerals (THERA-VIT-M) tablet Take 1 tablet by mouth every morning      acetaminophen (TYLENOL) 325 MG tablet Take 2 tablets (650 mg) by mouth every 4 hours (Patient not taking: Reported on 5/9/2022) 100 tablet 0    Fiber Adult Gummies 2 g CHEW Take 1 chew tab by mouth daily (Patient not taking: Reported on 7/19/2023)      ibuprofen (ADVIL/MOTRIN) 200 MG tablet Take 200 mg by mouth every 4 hours as needed for mild pain (Patient not taking: Reported on 4/13/2023)      polyethylene glycol (MIRALAX) 17 g packet Take 17 g by mouth daily (Patient not taking: Reported on 5/9/2022) 7 packet 0    Probiotic Product (PROBIOTIC DAILY PO) Take 1 capsule by mouth daily (Patient not taking: Reported on 7/19/2023)         ROS:  C: NEGATIVE for fever, chills, change in weight  I: NEGATIVE for worrisome rashes, moles or lesions  E: NEGATIVE for vision changes or irritation  E/M: NEGATIVE for ear, mouth and throat problems  R: NEGATIVE for significant cough or SOB  CV: NEGATIVE for chest pain, palpitations or peripheral edema  GI: NEGATIVE for nausea, abdominal pain, heartburn, or change in bowel habits  : NEGATIVE for frequency, dysuria, hematuria, vaginal discharge  M: NEGATIVE for significant arthralgias or myalgia  N: NEGATIVE for weakness, dizziness or paresthesias  E: NEGATIVE for temperature intolerance, skin/hair changes  P: NEGATIVE for changes in mood or affect    EXAM:  Blood pressure 128/80, pulse 76, temperature 98.4  F (36.9  C), resp. rate 16, height 1.651 m (5' 5\"), weight 80 kg (176 lb 4.8 oz), last menstrual period 08/23/2016, not currently breastfeeding.   BMI= Body mass index is 29.34 kg/m .  General - pleasant female in no acute distress.  Neck - supple without lymphadenopathy or " thyromegaly.  Lungs - clear to auscultation bilaterally.  Heart - regular rate and rhythm without murmur.  Breast - no nodularity, asymmetry or nipple discharge bilaterally.  Abdomen - soft, nontender, nondistended, no hepatosplenomegaly.  Pelvic - EG: normal adult female, BUS: within normal limits, Vagina: well rugated, no discharge, Cervix: no lesions or CMT, Uterus: firm, normal sized and nontender, Adnexae: no masses or tenderness.  Rectovaginal - deferred.  Musculoskeletal - no gross deformities.  Neurological - normal strength, sensation, and mental status.      ASSESSMENT/PLAN:  (N83.201) Cyst of right ovary  (primary encounter diagnosis)  Comment: mass   Plan: Case Request: Laparoscopy   Right oophorectomy,        , CEA        She is not in a hurry to undergo surgery  Will schedule at her convenience        Letter will be sent to the referring provider.    Godfrey Dasilva MD

## 2023-07-20 LAB — CANCER AG125 SERPL-ACNC: 11 U/ML

## 2023-07-20 NOTE — RESULT ENCOUNTER NOTE
Your results are back and they are normal.  This appears to be a benign ovarian mass.  Godfrey Dasilva MD

## 2023-07-28 ENCOUNTER — ANESTHESIA EVENT (OUTPATIENT)
Dept: SURGERY | Facility: CLINIC | Age: 38
End: 2023-07-28
Payer: COMMERCIAL

## 2023-07-28 NOTE — ANESTHESIA PREPROCEDURE EVALUATION
Anesthesia Pre-Procedure Evaluation    Patient: Clau Wilder   MRN: [id number] : 1985        Procedure : Procedure(s):  Laparoscopy  Right oophorectomy          Past Medical History:   Diagnosis Date     Calculi, ureter      Depressive disorder      IUD migration     perforation     Mental disorder       Past Surgical History:   Procedure Laterality Date     ARTHROSCOPY KNEE Right 2022    Procedure: Athroscopic synovial biopsy Right knee;  Surgeon: Kelton Sanders MD;  [facility]      SECTION       [id number] with stone removal       HYSTERECTOMY VAGINAL, BILATERAL SALPINGO-OOPHERECTOMY, COMBINED       HYSTEROSCOPY DIAGNOSTIC  2016     LAPAROSCOPY OPERATIVE ADULT  2016    removal of ectopic IUD     ORTHOPEDIC SURGERY      tendon repair ankle     MS HAND/FINGER SURGERY UNLISTED  10/06/2021     MS STOMACH SURGERY PROCEDURE UNLISTED      , IUD retrieval     SYNOVECTOMY KNEE Right 3/24/2022    Procedure: Open anterior synovectomy Right knee;  Surgeon: Kelton Sanders MD;  [facility]     SYNOVECTOMY KNEE POSTERIOR Right 3/24/2022    Procedure: Open posterior synovectomy Right knee;  Surgeon: Kelton Sanders MD;  [facility]      Allergies   Allergen Reactions     Erythromycin Nausea and Vomiting      Social History     Tobacco Use     Smoking status: Never     Smokeless tobacco: Never   Substance Use Topics     Alcohol use: Yes     Comment: occ.      Wt Readings from Last 1 Encounters:   23 80 kg (176 lb 4.8 oz)        Anesthesia Evaluation   Pt has had prior anesthetic. Type: General and MAC.        ROS/MED HX  ENT/Pulmonary:     (+)                    Intermittent, asthma                  Neurologic: Comment: Hx concussion 2022      Cardiovascular:  - neg cardiovascular ROS     METS/Exercise Tolerance:     Hematologic:     (+)      anemia,          Musculoskeletal:       GI/Hepatic:  - neg GI/hepatic ROS     Renal/Genitourinary:  - neg  Renal ROS     Endo:     (+)               Obesity,       Psychiatric/Substance Use:     (+) psychiatric history depression, anxiety and bipolar       Infectious Disease:  - neg infectious disease ROS     Malignancy:   (+) Malignancy, History of Other.Other CA Tenosynovial giant cell tumor of right knee Active status post Surgery.    Other:  - neg other ROS    (+)  , H/O Chronic Pain (Right knee),         Physical Exam    Airway        Mallampati: II   TM distance: > 3 FB   Neck ROM: full   Mouth opening: > 3 cm    Respiratory Devices and Support         Dental           Cardiovascular   cardiovascular exam normal          Pulmonary   pulmonary exam normal            OUTSIDE LABS:  CBC:   Lab Results   Component Value Date    WBC 5.5 03/24/2022    HGB 11.7 03/25/2022    HGB 12.1 03/24/2022    HCT 36.4 03/24/2022     03/24/2022     BMP:   Lab Results   Component Value Date    GLC 97 03/24/2022    GLC 88 02/18/2022     COAGS: No results found for: PTT, INR, FIBR  POC:   Lab Results   Component Value Date    HCG Negative 01/15/2007     HEPATIC: No results found for: ALBUMIN, PROTTOTAL, ALT, AST, GGT, ALKPHOS, BILITOTAL, BILIDIRECT, KEIRY  OTHER: No results found for: PH, LACT, A1C, JORGE, PHOS, MAG, LIPASE, AMYLASE, TSH, T4, T3, CRP, SED    Anesthesia Plan    ASA Status:  2       Anesthesia Type: General.     - Airway: ETT   Induction: Propofol.   Maintenance: TIVA.        Consents    Anesthesia Plan(s) and associated risks, benefits, and realistic alternatives discussed. Questions answered and patient/representative(s) expressed understanding.     - Discussed: Risks, Benefits and Alternatives for BOTH SEDATION and the PROCEDURE were discussed     - Discussed with:  Patient            Postoperative Care    Pain management: IV analgesics, Oral pain medications, Multi-modal analgesia.   PONV prophylaxis: Ondansetron (or other 5HT-3), Dexamethasone or Solumedrol     Comments:           H&P reviewed: Unable to attach  H&P to encounter due to EHR limitations. H&P Update: appropriate H&P reviewed, patient examined. No interval changes since H&P (within 30 days).       EFRAIN Frank CRNA

## 2023-07-31 ENCOUNTER — HOSPITAL ENCOUNTER (OUTPATIENT)
Facility: CLINIC | Age: 38
Discharge: HOME | End: 2023-07-31
Attending: OBSTETRICS & GYNECOLOGY | Admitting: OBSTETRICS & GYNECOLOGY
Payer: COMMERCIAL

## 2023-07-31 ENCOUNTER — ANESTHESIA (OUTPATIENT)
Dept: SURGERY | Facility: CLINIC | Age: 38
End: 2023-07-31
Payer: COMMERCIAL

## 2023-07-31 VITALS
SYSTOLIC BLOOD PRESSURE: 127 MMHG | HEART RATE: 87 BPM | WEIGHT: 175 LBS | HEIGHT: 64 IN | DIASTOLIC BLOOD PRESSURE: 80 MMHG | TEMPERATURE: 97.7 F | OXYGEN SATURATION: 98 % | BODY MASS INDEX: 29.88 KG/M2 | RESPIRATION RATE: 16 BRPM

## 2023-07-31 DIAGNOSIS — N83.201 CYST OF RIGHT OVARY: ICD-10-CM

## 2023-07-31 PROCEDURE — 88305 TISSUE EXAM BY PATHOLOGIST: CPT | Mod: TC | Performed by: OBSTETRICS & GYNECOLOGY

## 2023-07-31 PROCEDURE — 258N000003 HC RX IP 258 OP 636: Performed by: NURSE ANESTHETIST, CERTIFIED REGISTERED

## 2023-07-31 PROCEDURE — 250N000009 HC RX 250: Performed by: OBSTETRICS & GYNECOLOGY

## 2023-07-31 PROCEDURE — 250N000013 HC RX MED GY IP 250 OP 250 PS 637: Performed by: OBSTETRICS & GYNECOLOGY

## 2023-07-31 PROCEDURE — 88305 TISSUE EXAM BY PATHOLOGIST: CPT | Mod: 26 | Performed by: PATHOLOGY

## 2023-07-31 PROCEDURE — 271N000001 HC OR GENERAL SUPPLY NON-STERILE: Performed by: OBSTETRICS & GYNECOLOGY

## 2023-07-31 PROCEDURE — 272N000001 HC OR GENERAL SUPPLY STERILE: Performed by: OBSTETRICS & GYNECOLOGY

## 2023-07-31 PROCEDURE — 999N000141 HC STATISTIC PRE-PROCEDURE NURSING ASSESSMENT: Performed by: OBSTETRICS & GYNECOLOGY

## 2023-07-31 PROCEDURE — 250N000011 HC RX IP 250 OP 636: Performed by: OBSTETRICS & GYNECOLOGY

## 2023-07-31 PROCEDURE — 710N000012 HC RECOVERY PHASE 2, PER MINUTE: Performed by: OBSTETRICS & GYNECOLOGY

## 2023-07-31 PROCEDURE — 250N000011 HC RX IP 250 OP 636: Mod: JZ | Performed by: NURSE ANESTHETIST, CERTIFIED REGISTERED

## 2023-07-31 PROCEDURE — 710N000009 HC RECOVERY PHASE 1, LEVEL 1, PER MIN: Performed by: OBSTETRICS & GYNECOLOGY

## 2023-07-31 PROCEDURE — 360N000076 HC SURGERY LEVEL 3, PER MIN: Performed by: OBSTETRICS & GYNECOLOGY

## 2023-07-31 PROCEDURE — 250N000009 HC RX 250: Performed by: NURSE ANESTHETIST, CERTIFIED REGISTERED

## 2023-07-31 PROCEDURE — 88112 CYTOPATH CELL ENHANCE TECH: CPT | Mod: TC | Performed by: OBSTETRICS & GYNECOLOGY

## 2023-07-31 PROCEDURE — 88112 CYTOPATH CELL ENHANCE TECH: CPT | Mod: 26 | Performed by: PATHOLOGY

## 2023-07-31 PROCEDURE — 58661 LAPAROSCOPY REMOVE ADNEXA: CPT | Mod: RT | Performed by: OBSTETRICS & GYNECOLOGY

## 2023-07-31 PROCEDURE — 250N000013 HC RX MED GY IP 250 OP 250 PS 637: Performed by: NURSE ANESTHETIST, CERTIFIED REGISTERED

## 2023-07-31 PROCEDURE — 49322 LAPAROSCOPY ASPIRATION: CPT | Mod: LT | Performed by: OBSTETRICS & GYNECOLOGY

## 2023-07-31 PROCEDURE — 370N000017 HC ANESTHESIA TECHNICAL FEE, PER MIN: Performed by: OBSTETRICS & GYNECOLOGY

## 2023-07-31 RX ORDER — ACETAMINOPHEN 325 MG/1
975 TABLET ORAL ONCE
Status: DISCONTINUED | OUTPATIENT
Start: 2023-07-31 | End: 2023-07-31 | Stop reason: HOSPADM

## 2023-07-31 RX ORDER — ACETAMINOPHEN 325 MG/1
975 TABLET ORAL ONCE
Status: COMPLETED | OUTPATIENT
Start: 2023-07-31 | End: 2023-07-31

## 2023-07-31 RX ORDER — CEFAZOLIN SODIUM/WATER 2 G/20 ML
2 SYRINGE (ML) INTRAVENOUS
Status: COMPLETED | OUTPATIENT
Start: 2023-07-31 | End: 2023-07-31

## 2023-07-31 RX ORDER — SODIUM CHLORIDE, SODIUM LACTATE, POTASSIUM CHLORIDE, CALCIUM CHLORIDE 600; 310; 30; 20 MG/100ML; MG/100ML; MG/100ML; MG/100ML
INJECTION, SOLUTION INTRAVENOUS CONTINUOUS
Status: DISCONTINUED | OUTPATIENT
Start: 2023-07-31 | End: 2023-07-31 | Stop reason: HOSPADM

## 2023-07-31 RX ORDER — KETOROLAC TROMETHAMINE 30 MG/ML
INJECTION, SOLUTION INTRAMUSCULAR; INTRAVENOUS PRN
Status: DISCONTINUED | OUTPATIENT
Start: 2023-07-31 | End: 2023-07-31

## 2023-07-31 RX ORDER — OXYCODONE HYDROCHLORIDE 5 MG/1
5 TABLET ORAL
Status: DISCONTINUED | OUTPATIENT
Start: 2023-07-31 | End: 2023-07-31 | Stop reason: HOSPADM

## 2023-07-31 RX ORDER — HYDROXYZINE HYDROCHLORIDE 25 MG/1
25 TABLET, FILM COATED ORAL EVERY 6 HOURS PRN
Status: DISCONTINUED | OUTPATIENT
Start: 2023-07-31 | End: 2023-07-31 | Stop reason: HOSPADM

## 2023-07-31 RX ORDER — ONDANSETRON 2 MG/ML
INJECTION INTRAMUSCULAR; INTRAVENOUS PRN
Status: DISCONTINUED | OUTPATIENT
Start: 2023-07-31 | End: 2023-07-31

## 2023-07-31 RX ORDER — CEFAZOLIN SODIUM/WATER 2 G/20 ML
2 SYRINGE (ML) INTRAVENOUS SEE ADMIN INSTRUCTIONS
Status: DISCONTINUED | OUTPATIENT
Start: 2023-07-31 | End: 2023-07-31 | Stop reason: HOSPADM

## 2023-07-31 RX ORDER — NALOXONE HYDROCHLORIDE 0.4 MG/ML
0.2 INJECTION, SOLUTION INTRAMUSCULAR; INTRAVENOUS; SUBCUTANEOUS
Status: DISCONTINUED | OUTPATIENT
Start: 2023-07-31 | End: 2023-07-31 | Stop reason: HOSPADM

## 2023-07-31 RX ORDER — GABAPENTIN 300 MG/1
300 CAPSULE ORAL
Status: COMPLETED | OUTPATIENT
Start: 2023-07-31 | End: 2023-07-31

## 2023-07-31 RX ORDER — FENTANYL CITRATE 50 UG/ML
INJECTION, SOLUTION INTRAMUSCULAR; INTRAVENOUS PRN
Status: DISCONTINUED | OUTPATIENT
Start: 2023-07-31 | End: 2023-07-31

## 2023-07-31 RX ORDER — FENTANYL CITRATE 50 UG/ML
25 INJECTION, SOLUTION INTRAMUSCULAR; INTRAVENOUS
Status: DISCONTINUED | OUTPATIENT
Start: 2023-07-31 | End: 2023-07-31 | Stop reason: HOSPADM

## 2023-07-31 RX ORDER — FENTANYL CITRATE 50 UG/ML
25 INJECTION, SOLUTION INTRAMUSCULAR; INTRAVENOUS EVERY 5 MIN PRN
Status: DISCONTINUED | OUTPATIENT
Start: 2023-07-31 | End: 2023-07-31 | Stop reason: HOSPADM

## 2023-07-31 RX ORDER — LIDOCAINE 40 MG/G
CREAM TOPICAL
Status: DISCONTINUED | OUTPATIENT
Start: 2023-07-31 | End: 2023-07-31 | Stop reason: HOSPADM

## 2023-07-31 RX ORDER — NALOXONE HYDROCHLORIDE 0.4 MG/ML
0.4 INJECTION, SOLUTION INTRAMUSCULAR; INTRAVENOUS; SUBCUTANEOUS
Status: DISCONTINUED | OUTPATIENT
Start: 2023-07-31 | End: 2023-07-31 | Stop reason: HOSPADM

## 2023-07-31 RX ORDER — ACETAMINOPHEN 325 MG/1
975 TABLET ORAL ONCE
Status: DISCONTINUED | OUTPATIENT
Start: 2023-07-31 | End: 2023-07-31

## 2023-07-31 RX ORDER — BUPIVACAINE HYDROCHLORIDE 2.5 MG/ML
INJECTION, SOLUTION INFILTRATION; PERINEURAL PRN
Status: DISCONTINUED | OUTPATIENT
Start: 2023-07-31 | End: 2023-07-31 | Stop reason: HOSPADM

## 2023-07-31 RX ORDER — ONDANSETRON 4 MG/1
4 TABLET, ORALLY DISINTEGRATING ORAL EVERY 30 MIN PRN
Status: DISCONTINUED | OUTPATIENT
Start: 2023-07-31 | End: 2023-07-31 | Stop reason: HOSPADM

## 2023-07-31 RX ORDER — IBUPROFEN 400 MG/1
800 TABLET, FILM COATED ORAL ONCE
Status: DISCONTINUED | OUTPATIENT
Start: 2023-07-31 | End: 2023-07-31 | Stop reason: HOSPADM

## 2023-07-31 RX ORDER — BUPIVACAINE HYDROCHLORIDE AND EPINEPHRINE 5; 5 MG/ML; UG/ML
INJECTION, SOLUTION PERINEURAL PRN
Status: DISCONTINUED | OUTPATIENT
Start: 2023-07-31 | End: 2023-07-31 | Stop reason: HOSPADM

## 2023-07-31 RX ORDER — HYDROXYZINE HYDROCHLORIDE 50 MG/1
50 TABLET, FILM COATED ORAL EVERY 6 HOURS PRN
Status: DISCONTINUED | OUTPATIENT
Start: 2023-07-31 | End: 2023-07-31 | Stop reason: HOSPADM

## 2023-07-31 RX ORDER — FENTANYL CITRATE 50 UG/ML
50 INJECTION, SOLUTION INTRAMUSCULAR; INTRAVENOUS EVERY 5 MIN PRN
Status: DISCONTINUED | OUTPATIENT
Start: 2023-07-31 | End: 2023-07-31 | Stop reason: HOSPADM

## 2023-07-31 RX ORDER — HYDROMORPHONE HCL IN WATER/PF 6 MG/30 ML
0.4 PATIENT CONTROLLED ANALGESIA SYRINGE INTRAVENOUS EVERY 5 MIN PRN
Status: DISCONTINUED | OUTPATIENT
Start: 2023-07-31 | End: 2023-07-31 | Stop reason: HOSPADM

## 2023-07-31 RX ORDER — PROPOFOL 10 MG/ML
INJECTION, EMULSION INTRAVENOUS CONTINUOUS PRN
Status: DISCONTINUED | OUTPATIENT
Start: 2023-07-31 | End: 2023-07-31

## 2023-07-31 RX ORDER — PHENAZOPYRIDINE HYDROCHLORIDE 200 MG/1
200 TABLET, FILM COATED ORAL ONCE
Status: COMPLETED | OUTPATIENT
Start: 2023-07-31 | End: 2023-07-31

## 2023-07-31 RX ORDER — MAGNESIUM SULFATE HEPTAHYDRATE 40 MG/ML
INJECTION, SOLUTION INTRAVENOUS PRN
Status: DISCONTINUED | OUTPATIENT
Start: 2023-07-31 | End: 2023-07-31

## 2023-07-31 RX ORDER — ONDANSETRON 2 MG/ML
4 INJECTION INTRAMUSCULAR; INTRAVENOUS EVERY 30 MIN PRN
Status: DISCONTINUED | OUTPATIENT
Start: 2023-07-31 | End: 2023-07-31 | Stop reason: HOSPADM

## 2023-07-31 RX ORDER — PROPOFOL 10 MG/ML
INJECTION, EMULSION INTRAVENOUS PRN
Status: DISCONTINUED | OUTPATIENT
Start: 2023-07-31 | End: 2023-07-31

## 2023-07-31 RX ORDER — GLYCOPYRROLATE 0.2 MG/ML
INJECTION, SOLUTION INTRAMUSCULAR; INTRAVENOUS PRN
Status: DISCONTINUED | OUTPATIENT
Start: 2023-07-31 | End: 2023-07-31

## 2023-07-31 RX ORDER — HYDROMORPHONE HCL IN WATER/PF 6 MG/30 ML
0.2 PATIENT CONTROLLED ANALGESIA SYRINGE INTRAVENOUS EVERY 5 MIN PRN
Status: DISCONTINUED | OUTPATIENT
Start: 2023-07-31 | End: 2023-07-31 | Stop reason: HOSPADM

## 2023-07-31 RX ORDER — DEXAMETHASONE SODIUM PHOSPHATE 4 MG/ML
INJECTION, SOLUTION INTRA-ARTICULAR; INTRALESIONAL; INTRAMUSCULAR; INTRAVENOUS; SOFT TISSUE PRN
Status: DISCONTINUED | OUTPATIENT
Start: 2023-07-31 | End: 2023-07-31

## 2023-07-31 RX ORDER — OXYCODONE HYDROCHLORIDE 5 MG/1
5-10 TABLET ORAL EVERY 4 HOURS PRN
Qty: 6 TABLET | Refills: 0 | Status: SHIPPED | OUTPATIENT
Start: 2023-07-31 | End: 2023-09-05

## 2023-07-31 RX ORDER — LIDOCAINE HYDROCHLORIDE 10 MG/ML
INJECTION, SOLUTION INFILTRATION; PERINEURAL PRN
Status: DISCONTINUED | OUTPATIENT
Start: 2023-07-31 | End: 2023-07-31

## 2023-07-31 RX ADMIN — LIDOCAINE HYDROCHLORIDE 0.1 ML: 10 INJECTION, SOLUTION EPIDURAL; INFILTRATION; INTRACAUDAL; PERINEURAL at 07:07

## 2023-07-31 RX ADMIN — SODIUM CHLORIDE, POTASSIUM CHLORIDE, SODIUM LACTATE AND CALCIUM CHLORIDE: 600; 310; 30; 20 INJECTION, SOLUTION INTRAVENOUS at 07:07

## 2023-07-31 RX ADMIN — DEXAMETHASONE SODIUM PHOSPHATE 8 MG: 4 INJECTION, SOLUTION INTRA-ARTICULAR; INTRALESIONAL; INTRAMUSCULAR; INTRAVENOUS; SOFT TISSUE at 07:36

## 2023-07-31 RX ADMIN — PHENAZOPYRIDINE 200 MG: 200 TABLET ORAL at 06:40

## 2023-07-31 RX ADMIN — LIDOCAINE HYDROCHLORIDE 50 MG: 10 INJECTION, SOLUTION INFILTRATION; PERINEURAL at 07:36

## 2023-07-31 RX ADMIN — PROPOFOL 200 MG: 10 INJECTION, EMULSION INTRAVENOUS at 07:36

## 2023-07-31 RX ADMIN — HYDROMORPHONE HYDROCHLORIDE 0.5 MG: 1 INJECTION, SOLUTION INTRAMUSCULAR; INTRAVENOUS; SUBCUTANEOUS at 07:50

## 2023-07-31 RX ADMIN — GABAPENTIN 300 MG: 300 CAPSULE ORAL at 06:41

## 2023-07-31 RX ADMIN — SUGAMMADEX 150 MG: 100 INJECTION, SOLUTION INTRAVENOUS at 08:20

## 2023-07-31 RX ADMIN — ONDANSETRON 4 MG: 2 INJECTION INTRAMUSCULAR; INTRAVENOUS at 08:13

## 2023-07-31 RX ADMIN — ACETAMINOPHEN 975 MG: 325 TABLET, FILM COATED ORAL at 06:40

## 2023-07-31 RX ADMIN — MIDAZOLAM 2 MG: 1 INJECTION INTRAMUSCULAR; INTRAVENOUS at 07:29

## 2023-07-31 RX ADMIN — FENTANYL CITRATE 100 MCG: 50 INJECTION, SOLUTION INTRAMUSCULAR; INTRAVENOUS at 07:36

## 2023-07-31 RX ADMIN — PROPOFOL 250 MCG/KG/MIN: 10 INJECTION, EMULSION INTRAVENOUS at 07:36

## 2023-07-31 RX ADMIN — HYDROMORPHONE HYDROCHLORIDE 0.5 MG: 1 INJECTION, SOLUTION INTRAMUSCULAR; INTRAVENOUS; SUBCUTANEOUS at 08:04

## 2023-07-31 RX ADMIN — MAGNESIUM SULFATE HEPTAHYDRATE 2 G: 40 INJECTION, SOLUTION INTRAVENOUS at 07:39

## 2023-07-31 RX ADMIN — GLYCOPYRROLATE 0.1 MG: 0.2 INJECTION, SOLUTION INTRAMUSCULAR; INTRAVENOUS at 07:36

## 2023-07-31 RX ADMIN — KETOROLAC TROMETHAMINE 15 MG: 30 INJECTION, SOLUTION INTRAMUSCULAR at 08:13

## 2023-07-31 RX ADMIN — ROCURONIUM BROMIDE 40 MG: 50 INJECTION, SOLUTION INTRAVENOUS at 07:36

## 2023-07-31 RX ADMIN — Medication 2 G: at 07:24

## 2023-07-31 ASSESSMENT — ACTIVITIES OF DAILY LIVING (ADL)
ADLS_ACUITY_SCORE: 35
ADLS_ACUITY_SCORE: 35

## 2023-07-31 NOTE — ANESTHESIA CARE TRANSFER NOTE
Patient: Clau Wilder    Procedure: Procedure(s):  Laparoscopy  Right oophorectomy       Diagnosis: Cyst of right ovary [N83.201]  Diagnosis Additional Information: No value filed.    Anesthesia Type:   General     Note:    Oropharynx: oropharynx clear of all foreign objects  Level of Consciousness: drowsy  Oxygen Supplementation: face mask    Independent Airway: airway patency satisfactory and stable  Dentition: dentition unchanged  Vital Signs Stable: post-procedure vital signs reviewed and stable  Report to RN Given: handoff report given  Patient transferred to: PACU    Handoff Report: Identifed the Patient, Identified the Reponsible Provider, Reviewed the pertinent medical history, Discussed the surgical course, Reviewed Intra-OP anesthesia mangement and issues during anesthesia, Set expectations for post-procedure period and Allowed opportunity for questions and acknowledgement of understanding      Vitals:  Vitals Value Taken Time   BP     Temp     Pulse 98 07/31/23 0840   Resp 11 07/31/23 0840   SpO2 99 % 07/31/23 0840   Vitals shown include unvalidated device data.    Electronically Signed By: EFRAIN Mccann CRNA  July 31, 2023  8:41 AM

## 2023-07-31 NOTE — BRIEF OP NOTE
[facility]    Brief Operative Note    Pre-operative diagnosis: Cyst of right ovary [N83.201]  Post-operative diagnosis Same as pre-operative diagnosis  Left ovarian cyst    Procedure: Procedure(s):  Laparoscopy  Right oophorectomy  Pelvic washings  Aspiration of left ovarian cyst  Surgeon: Surgeon(s) and Role:     * Godfrey Dasilva MD - Primary     * Jerod Bowden PA-C  Anesthesia: General   Estimated Blood Loss: Less than 10 ml    Drains: None  Specimens:   ID Type Source Tests Collected by Time Destination   1 : Pelvic washing Washings Pelvis NON-GYNECOLOGIC CYTOLOGY Godfrey Dasilva MD 7/31/2023  8:03 AM    2 : Left ovarian cyst fluid - cytology Cyst Ovary, Left NON-GYNECOLOGIC CYTOLOGY Godfrey Dasilva MD 7/31/2023  8:08 AM    3 :  Tissue Ovary, Right SURGICAL PATHOLOGY EXAM Godfrey Dasilva MD 7/31/2023  8:11 AM      Findings:   Normal liver, gallbladder, appendix, left Ovary with a simple cyst, Right ovary with normal external appearance, absent uterus, cervix and fallopian tubes.  Complications: None.  Implants: * No implants in log *    Godfrey Dasilva MD

## 2023-07-31 NOTE — ANESTHESIA POSTPROCEDURE EVALUATION
Patient: Clau Wilder    Procedure: Procedure(s):  Laparoscopy  Right oophorectomy       Anesthesia Type:  General    Note:  Disposition: Outpatient   Postop Pain Control: Uneventful            Sign Out: Well controlled pain   PONV: No   Neuro/Psych: Uneventful            Sign Out: Acceptable/Baseline neuro status   Airway/Respiratory: Uneventful            Sign Out: Acceptable/Baseline resp. status   CV/Hemodynamics: Uneventful            Sign Out: Acceptable CV status; No obvious hypovolemia; No obvious fluid overload   Other NRE: NONE   DID A NON-ROUTINE EVENT OCCUR? No           Last vitals:  Vitals Value Taken Time   /79 07/31/23 0930   Temp 36.8  C (98.2  F) 07/31/23 0840   Pulse 74 07/31/23 0932   Resp 15 07/31/23 0932   SpO2 98 % 07/31/23 0933       Electronically Signed By: EFRAIN Mccann CRNA  July 31, 2023  9:54 AM

## 2023-07-31 NOTE — DISCHARGE INSTRUCTIONS
Same Day Surgery Discharge Instructions  Special Precautions After Surgery - Adult    It is not unusual to feel lightheaded or faint, up to 24 hours after surgery or while taking pain medication.  If you have these symptoms; sit for a few minutes before standing and have someone assist you when getting up.  You should rest and relax for the next 24 hours and must have someone stay with you for at least 24 hours after your discharge.  DO NOT DRIVE any vehicle or operate mechanical equipment for 24 hours following the end of your surgery.  DO NOT DRIVE while taking narcotic pain medications that have been prescribed by your physician.  If you had a limb operated on, you must be able to use it fully to drive.  DO NOT drink alcoholic beverages for 24 hours following surgery or while taking prescription pain medication.  Drink clear liquids (apple juice, ginger ale, broth, 7-Up, etc.).  Progress to your regular diet as you feel able.  Any questions call your physician and do not make important decisions for 24 hours.  Nurse Advice Line: [phone]  Nausea and Vomiting: Nausea and vomiting can occur any time after receiving anesthesia. If you experience nausea and vomiting we encourage you to move to a clear liquid diet and advance your diet as tolerated. If nausea and vomiting do not improve within 12 hours please call the surgeon or present to the Emergency department.     Break-through Bleeding: If your experience bleeding from your surgical site apply pressure and additional dressing per nurse instruction. For simple problems such as a saturated dressing, you may need to reinforce the dressing with more gauze and tape and put slight pressure on the site. If bleeding does not subside contact the surgeon or present to the Emergency Department.    Post-op Infection: If you develop a fever of 100.4 or greater, have pus like drainage, redness, swelling or severe pain at the surgical site not  alleviated with pain medications; please contact the surgeon or present to the Emergency Department.  ______________________________________________________________________________  IMPORTANT NUMBERS:    [facility] [place]  [phone], [phone]  Pharmacy:  [phone]  Same Day Surgery:  [phone], Monday - Friday until 8:30 p.m.  Urgent Care:  [phone]  Emergency Room:  [phone]      [facility]:  [phone]                                                                                 Clinic:  [phone]

## 2023-07-31 NOTE — OP NOTE
[facility] Gynecology Operative Note    Pre-operative diagnosis: Cyst of right ovary [N83.201]   Post-operative diagnosis: Same   Procedure: Laparoscopy  Right oophorectomy   Surgeon: Godfrey Dasilva MD   Assistant(s): Jerod Bowden PA-C  A surgical assistant was required for this surgery for his experience with retraction, achievement of hemostasis, and wound closure     Anesthesia: General Endotracheal Anesthesia   Estimated blood loss: 5 ml   Total IV fluids: (See anesthesia record)  600ml   Blood transfusion: No transfusion was given during surgery   Total urine output: (See anesthesia record)  25 ml   Drains: None   Specimens: Pelvic Washing, left ovarian cyst fluid, Right Ovary   Findings: Normal liver, gallbladder, appendix, left Ovary with a simple cyst, Right ovary with normal external appearance, absent uterus, cervix and fallopian tubes   Complications: None   Condition: Stable   Comments: Clau LILI Wilder   1985  [id number]      Clau Wilder  presented for the above procedure.  She has a persistent mass in the right ovary, is s/p repeat ultrasounds showing persistence of the right ovarian mass and normal ovarian serum markers.  I met with Clau  and her S.O. Chad and discussed the planned procedure as well as the expected post operative course.  Risks of complications were noted and postoperative signs to watch for outlined.  Questions were answered and consent signed.  She was taken to the OR [facility] where she was placed in the supine position. She underwent General anesthesia with endotracheal intubation.  She was then placed in the Dorso-lithotomy position in David stirru.  An examination under anesthesia was performed that showed: Normal external genitalia, vagina well supported and intact.  Cervix and uterus surgically absent.    She was prepped and draped.  A timeout was held confirming her identity and proposed procedures. All were in agreement.    A  speculum was placed in vagina and the vaginal cuff was visualized.  A stick sponge was placed in the vagina and the speculum removed.  Arizmendi catheter was placed in the bladder to straight drainage.  This drained Pyridium stained urine.  These instruments were then sterilely draped.  Attention was turned to the abdomen.    All the abdominal incisions were preinstilled with 0.25% bupivacaine.  An umbilical incision was made and the abdominal wall grasped.  Veress needle placed through the incision.  Opening pressure was 6 mmHg.  2.2 L of CO2 were insufflated the Veress was removed and a 5 mm trocar cannula placed with the same incision.  Placement was documented visually.    A 5 mm port was placed in the right lower quadrant under direct vision.  A 12 mm port was placed in the suprapubic area also under direct vision.    Findings included those noted above.    Pelvic washings were performed and these were sent separately.  A laparoscopic needle was used to drain the left ovarian cyst.  3 cc of fluid were obtained and the cyst collapsed.  The course of the right ureter was identified and this was removed from the surgical field.  The right infundibulopelvic ligament was crossclamped cauterized and transected.  The ovary was freed placed in an Endo Catch bag and removed through the 12 mm port site.  0.5% bupivacaine was instilled with the use of a butterfly needle involving the round ligaments the infundibulopelvic ligaments bilaterally and the uterosacral ligaments.  This for postoperative pain control.  0.25% bupivacaine was instilled intra-abdominally for postoperative pain control.  The pneumoperitoneum was reduced after the 12 mm port site was removed and the fascial closure system with 0 Vicryl used to close the suprapubic site.  The 5 mm ports were then removed after pneumoperitoneum was reduced and incisions were closed by Jerod Bowden PA-C.  My attention turned to the vagina.  The stick sponge was removed,  Arizmendi catheter removed.  25 cc of urine collected.  This point the procedure was complete.  Sponge and needle counts were correct.  Patient is awakened and taken to the PACU in good condition.  Godfrey Dasilva MD

## 2023-07-31 NOTE — ANESTHESIA PROCEDURE NOTES
Airway       Patient location during procedure: OR       Procedure Start/Stop Times: 7/31/2023 7:38 AM  Staff -        CRNA: Cecilia Sanchez APRN CRNA       Performed By: CRNA  Consent for Airway        Urgency: elective  Indications and Patient Condition       Indications for airway management: brody-procedural and airway protection       Induction type:intravenous       Mask difficulty assessment: 1 - vent by mask    Final Airway Details       Final airway type: endotracheal airway       Successful airway: ETT - single  Endotracheal Airway Details        ETT size (mm): 7.0       Cuffed: yes       Successful intubation technique: video laryngoscopy       VL Blade Size: Ramna 3       Grade View of Cords: 1       Adjucts: stylet       Position: Right       Measured from: gums/teeth       Secured at (cm): 22       Bite block used: None    Post intubation assessment        Placement verified by: capnometry, equal breath sounds and chest rise        Number of attempts at approach: 1       Number of other approaches attempted: 0       Secured with: silk tape       Ease of procedure: easy       Dentition: Intact and Unchanged    Medication(s) Administered   Medication Administration Time: 7/31/2023 7:38 AM

## 2023-08-02 ENCOUNTER — TELEPHONE (OUTPATIENT)
Dept: OBGYN | Facility: CLINIC | Age: 38
End: 2023-08-02
Payer: COMMERCIAL

## 2023-08-02 NOTE — TELEPHONE ENCOUNTER
Completed paperwork was faxed and mailed as requested.  Will keep a copy up front for a few weeks and then send it to be scanned into chart.    -Maria Antonia Newman  Clinic Station

## 2023-08-02 NOTE — TELEPHONE ENCOUNTER
Completed paperwork ([facility] and [employer]) was given to Dr. Dasilva to sign.    -Maria Antonia GUILLORY [facility]  Clinic Station

## 2023-08-03 LAB
PATH REPORT.COMMENTS IMP SPEC: NORMAL
PATH REPORT.FINAL DX SPEC: NORMAL
PATH REPORT.GROSS SPEC: NORMAL
PATH REPORT.MICROSCOPIC SPEC OTHER STN: NORMAL

## 2023-09-05 ENCOUNTER — OFFICE VISIT (OUTPATIENT)
Dept: OBGYN | Facility: CLINIC | Age: 38
End: 2023-09-05
Payer: COMMERCIAL

## 2023-09-05 VITALS
TEMPERATURE: 98.1 F | HEIGHT: 64 IN | SYSTOLIC BLOOD PRESSURE: 132 MMHG | HEART RATE: 78 BPM | WEIGHT: 176 LBS | BODY MASS INDEX: 30.05 KG/M2 | DIASTOLIC BLOOD PRESSURE: 67 MMHG | RESPIRATION RATE: 16 BRPM

## 2023-09-05 DIAGNOSIS — N83.201 RIGHT OVARIAN CYST: Primary | ICD-10-CM

## 2023-09-05 DIAGNOSIS — Z98.890 POSTOPERATIVE STATE: ICD-10-CM

## 2023-09-05 DIAGNOSIS — Z98.890 S/P LAPAROSCOPIC PROCEDURE: ICD-10-CM

## 2023-09-05 PROCEDURE — 99212 OFFICE O/P EST SF 10 MIN: CPT | Performed by: OBSTETRICS & GYNECOLOGY

## 2023-09-05 NOTE — PROGRESS NOTES
"JR Wilder is a 38 year old female presents for post operative check. She is  6  week(s) status post Laparoscopic RSO and aspiration of Left ovarian cyst  .  She reports doing well and denies significant pain or bleeding. There were no pathological findings.    O.  Blood pressure 132/67, pulse 78, temperature 98.1  F (36.7  C), resp. rate 16, height 1.626 m (5' 4\"), weight 79.8 kg (176 lb), last menstrual period 08/23/2016, not currently breastfeeding.    Abd: soft, non-tender, non-distended. Incisions clear, dry, and intact without evidence of infection.    A. /P.  (N83.201) Right ovarian cyst  (primary encounter diagnosis)  (Z98.890) S/P laparoscopic procedure  Comment: RSO and aspiration  (Z98.890) Postoperative state  Comment: doing well  Plan: resume normal activities       Follow up prn or when due for next annual exam.    Godfrey Dasilva MD    "

## 2023-10-16 ENCOUNTER — ANCILLARY PROCEDURE (OUTPATIENT)
Dept: MRI IMAGING | Facility: CLINIC | Age: 38
End: 2023-10-16
Attending: ORTHOPAEDIC SURGERY
Payer: COMMERCIAL

## 2023-10-16 ENCOUNTER — OFFICE VISIT (OUTPATIENT)
Dept: ORTHOPEDICS | Facility: CLINIC | Age: 38
End: 2023-10-16
Payer: COMMERCIAL

## 2023-10-16 DIAGNOSIS — D48.19 TENOSYNOVIAL GIANT CELL TUMOR OF KNEE: ICD-10-CM

## 2023-10-16 DIAGNOSIS — D48.19 TENOSYNOVIAL GIANT CELL TUMOR OF KNEE: Primary | ICD-10-CM

## 2023-10-16 PROCEDURE — 73723 MRI JOINT LWR EXTR W/O&W/DYE: CPT | Mod: RT | Performed by: RADIOLOGY

## 2023-10-16 PROCEDURE — 99213 OFFICE O/P EST LOW 20 MIN: CPT | Performed by: ORTHOPAEDIC SURGERY

## 2023-10-16 PROCEDURE — A9585 GADOBUTROL INJECTION: HCPCS | Mod: JZ | Performed by: RADIOLOGY

## 2023-10-16 RX ORDER — GADOBUTROL 604.72 MG/ML
10 INJECTION INTRAVENOUS ONCE
Status: COMPLETED | OUTPATIENT
Start: 2023-10-16 | End: 2023-10-16

## 2023-10-16 RX ADMIN — GADOBUTROL 8 ML: 604.72 INJECTION INTRAVENOUS at 12:01

## 2023-10-16 NOTE — LETTER
10/16/2023         RE: Clau Wilder  [address]  [place] [address]        Dear Colleague,    Thank you for referring your patient, Clau Wilder, to the [facility] [place]. Please see a copy of my visit note below.        [facility]  Orthopaedic Surgery, Joint Replacement Consultation  by Kelton Sanders M.D.    Clau Wilder MRN# [id number]   Age: 37 year old YOB: 1985     Requesting physician: Alyssa Verdugo       Background:   DX:  Right knee atraumatic effusion and pain, May 2021  TGCT R knee, diffuse type     TREATMENTS:  12/9/21 MRI of right knee demonstrating Synovitis with multiple masslike areas of  synovial proliferation with associated susceptibility artifact.  Findings highly suspicious for PVNS.  2/18/2022, 'scope biopsy R knee (Tommy) [facility]  4.  3/24/2022, Right knee anterior/ posterior synovectomy (Tommy) [facility]        Clau returns 1.5 years for recheck in regards to her right knee joint status post anterior posterior synovectomy.  She reports she is doing well but does note some intermittent swelling of the knee joint.  It is not severe enough to warrant any analgesic medication and she is able perform all regular activities.  She feels that her symptoms are slightly worse compared to her visit 6 months ago.  She is actively involved in the [employer] for this disease and [employer].    Examination reveals small effusion today with minimal warmth.  Full active extension and further flexion to 135 degrees symmetric with the contralateral side.  Her gait is normal.  Collateral cruciate ligaments are without laxity.  Negative anterior drawer.    MRI examination as noted below and shows areas of increased effusion and more areas of disease in my opinion just medial to the posterior cruciate ligament as well as the posterior aspect of the lateral compartment tibial  jose alejandro.              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    Plan:  I discussed with the patient the above findings and my opinions about the likelihood of having residual or recurrent disease present.  Control may be difficult with subsequent additional surgical resection.  Options for treatment consist of (1) serial observation with MRI and no intervention, (2) repeat surgical excision with posterior synovectomy and arthroscopic examination of anterior compartment, (3) medical therapy with nonsurgical management using either pexidartinib (Turalio) or other tyrosine kinase inhibitor with some anti-CSF 1 activity such as Gleevac.    After discussing these issues with the patient, she still would prefer to forego any intervention, medically or surgically, and is not interested in pexidartinib.  I did bring up the option of a research trial and she said she would appreciate more information if any is available.  Therefore I will communicate with Dr. Jarocho Alva.      We will keep her under follow-up surveillance with another MRI in 6 months time .  The KOOS, [employer] and BARBRAA lower extremity PROMs should be completed at her next visit      MD Richard Taylor Family Professor  Oncology and Adult Reconstructive Surgery  Dept Orthopaedic Surgery, [facility]  [phone] office, [phone] pager  [email or web address]    Total combined visit time and work time before and after clinic visit on encounter date = 20 min

## 2023-10-16 NOTE — PROGRESS NOTES
[facility]  Orthopaedic Surgery, Joint Replacement Consultation  by Kelton Sanders M.D.    Clau Wilder MRN# [id number]   Age: 37 year old YOB: 1985     Requesting physician: Alyssa Verdugo       Background:   DX:  Right knee atraumatic effusion and pain, May 2021  TGCT R knee, diffuse type     TREATMENTS:  12/9/21 MRI of right knee demonstrating Synovitis with multiple masslike areas of  synovial proliferation with associated susceptibility artifact.  Findings highly suspicious for PVNS.  2/18/2022, 'scope biopsy R knee (Tommy) [facility]  4.  3/24/2022, Right knee anterior/ posterior synovectomy (Tommy) [facility]        Clau returns 1.5 years for recheck in regards to her right knee joint status post anterior posterior synovectomy.  She reports she is doing well but does note some intermittent swelling of the knee joint.  It is not severe enough to warrant any analgesic medication and she is able perform all regular activities.  She feels that her symptoms are slightly worse compared to her visit 6 months ago.  She is actively involved in the [employer] for this disease and [employer].    Examination reveals small effusion today with minimal warmth.  Full active extension and further flexion to 135 degrees symmetric with the contralateral side.  Her gait is normal.  Collateral cruciate ligaments are without laxity.  Negative anterior drawer.    MRI examination as noted below and shows areas of increased effusion and more areas of disease in my opinion just medial to the posterior cruciate ligament as well as the posterior aspect of the lateral compartment tibial plateau.              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    Plan:  I discussed with the patient the above findings and my opinions about the likelihood of having residual or recurrent disease present.  Control may be  difficult with subsequent additional surgical resection.  Options for treatment consist of (1) serial observation with MRI and no intervention, (2) repeat surgical excision with posterior synovectomy and arthroscopic examination of anterior compartment, (3) medical therapy with nonsurgical management using either pexidartinib (Turalio) or other tyrosine kinase inhibitor with some anti-CSF 1 activity such as Gleevac.    After discussing these issues with the patient, she still would prefer to forego any intervention, medically or surgically, and is not interested in pexidartinib.  I did bring up the option of a research trial and she said she would appreciate more information if any is available.  Therefore I will communicate with Dr. Jarocho Alva.      We will keep her under follow-up surveillance with another MRI in 6 months time .  The KOOS, [employer] and BARBARA lower extremity PROMs should be completed at her next visit      MD Richard Taylor Family Professor  Oncology and Adult Reconstructive Surgery  Dept Orthopaedic Surgery, [facility]  [phone] office, [phone] pager  [email or web address]    Total combined visit time and work time before and after clinic visit on encounter date = 20 min

## 2023-10-16 NOTE — NURSING NOTE
Chief Complaint   Patient presents with    RECHECK     Patient presents to clinic for 6 month follow up with right knee MRI today.       38 year old  1985    LMP 2016      Pain Assessment  Patient Currently in Pain: Yes  0-10 Pain Scale: 2  Primary Pain Location: Knee (right knee)    Past Surgical History:   Procedure Laterality Date    ARTHROSCOPY KNEE Right 2022    Procedure: Athroscopic synovial biopsy Right knee;  Surgeon: Kelton Sanders MD;  [facility]     SECTION      [id number] with stone removal      HYSTERECTOMY VAGINAL, BILATERAL SALPINGO-OOPHERECTOMY, COMBINED      HYSTEROSCOPY DIAGNOSTIC  2016    LAPAROSCOPIC OOPHORECTOMY Right 2023    Procedure: Laparoscopy  Right oophorectomy;  Surgeon: Godfrey Dasilva MD;  Location: [facility]    LAPAROSCOPY OPERATIVE ADULT  2016    removal of ectopic IUD    ORTHOPEDIC SURGERY      tendon repair ankle    DC HAND/FINGER SURGERY UNLISTED  10/06/2021    DC STOMACH SURGERY PROCEDURE UNLISTED      , IUD retrieval    SYNOVECTOMY KNEE Right 3/24/2022    Procedure: Open anterior synovectomy Right knee;  Surgeon: Kelton Sanders MD;  [facility]    SYNOVECTOMY KNEE POSTERIOR Right 3/24/2022    Procedure: Open posterior synovectomy Right knee;  Surgeon: Kelton Sanders MD;  [facility]         [employer] [address]        Allergies   Allergen Reactions    Erythromycin Nausea and Vomiting           Current Outpatient Medications   Medication    acetaminophen (TYLENOL) 325 MG tablet    Fiber Adult Gummies 2 g CHEW    FLUoxetine (PROZAC) 10 MG capsule    ibuprofen (ADVIL/MOTRIN) 200 MG tablet    lamoTRIgine (LAMICTAL) 150 MG tablet    multivitamin w/minerals (THERA-VIT-M) tablet    polyethylene glycol (MIRALAX) 17 g packet    Probiotic Product (PROBIOTIC DAILY PO)     No current facility-administered medications for this visit.              Questionnaires:    HOOS Hip Dysfunction & Osteoarthritis Outcome Questionnaire         No data to display                       KOOS Knee Survey Assessment        11/10/2022    10:12 AM   Knee Outcome Survey ADL Scale (ROSMERY Alexis; RUSS Schafer; Brianna, RS; Dru, FH; Jewell, BHAVANA; 1998)   Do you have swelling in your knee? Rarely   Do you feel grinding, hear clicking or any other type of noise when your knee moves? Never   Does your knee catch or hang up when moving? Never   Can you straighten your knee fully? Always   Can you bend your knee fully? Often   How severe is your knee joint stiffness after first wakening in the morning? Mild   How severe is your knee stiffness after sitting, lying or resting LATER IN THE DAY? Mild   How often do you experience knee pain? Weekly   Twisting/pivoting on your knee Mild   Straightening knee fully None   Bending knee fully Mild   Walking on flat surface None   Going up or down stairs Mild   At night while in bed None   Sitting or lying Mild   Standing upright None   Descending stairs Mild   Ascending stairs Mild   Rising from sitting Mild   Standing Mild   Bending to floor/ an object None   Walking on flat surface None   Getting in/out of car None   Going shopping None   Putting on socks/stockings None   Rising from bed None   Taking off socks/stockings None   Lying in bed (turning over, maintaining knee position) None   Getting in/out of bath None   Sitting None   Getting on/off toilet None   Heavy domestic duties (moving heavy boxes, scrubbing floors, etc) Mild   Light domestic duties (cooking, dusting, etc) None   Squatting Mild   Running Mild   Jumping Mild   Twisting/pivoting on your injured knee None   Kneeling Mild   How often are you aware of your knee problem? Daily   Have you modified your life style to avoid potentially damaging activities to your knee? Not at all   How much are you troubled with lack of confidence in your knee? Not at all   In  general, how much difficulty do you have with your knee? Mild   Pain Score 83.33   Symptoms Score 64.29   Function, Daily Living Score 92.65   Sports and Rec Score 80   Quality of Life Score 75              Promis 10 Assessment        4/13/2023     9:11 AM   PROMIS 10   In general, would you say your health is: Very good   In general, would you say your quality of life is: Very good   In general, how would you rate your physical health? Good   In general, how would you rate your mental health, including your mood and your ability to think? Very good   In general, how would you rate your satisfaction with your social activities and relationships? Excellent   In general, please rate how well you carry out your usual social activities and roles Very good   To what extent are you able to carry out your everyday physical activities such as walking, climbing stairs, carrying groceries, or moving a chair? Completely   In the past 7 days, how often have you been bothered by emotional problems such as feeling anxious, depressed, or irritable? Never   In the past 7 days, how would you rate your fatigue on average? Mild   In the past 7 days, how would you rate your pain on average, where 0 means no pain, and 10 means worst imaginable pain? 1   In general, would you say your health is: 4   In general, would you say your quality of life is: 4   In general, how would you rate your physical health? 3   In general, how would you rate your mental health, including your mood and your ability to think? 4   In general, how would you rate your satisfaction with your social activities and relationships? 5   In general, please rate how well you carry out your usual social activities and roles. (This includes activities at home, at work and in your community, and responsibilities as a parent, child, spouse, employee, friend, etc.) 4   To what extent are you able to carry out your everyday physical activities such as walking, climbing  stairs, carrying groceries, or moving a chair? 5   In the past 7 days, how often have you been bothered by emotional problems such as feeling anxious, depressed, or irritable? 1   In the past 7 days, how would you rate your fatigue on average? 2   In the past 7 days, how would you rate your pain on average, where 0 means no pain, and 10 means worst imaginable pain? 1   Global Mental Health Score 18   Global Physical Health Score 16   PROMIS TOTAL - SUBSCORES 34              Ortho [facility] Knee Questionnaire         No data to display

## 2024-01-15 ENCOUNTER — OFFICE VISIT (OUTPATIENT)
Dept: DERMATOLOGY | Facility: CLINIC | Age: 39
End: 2024-01-15
Payer: COMMERCIAL

## 2024-01-15 DIAGNOSIS — D23.9 DERMAL NEVUS: ICD-10-CM

## 2024-01-15 DIAGNOSIS — L81.4 LENTIGO: Primary | ICD-10-CM

## 2024-01-15 DIAGNOSIS — D18.01 ANGIOMA OF SKIN: ICD-10-CM

## 2024-01-15 DIAGNOSIS — D22.9 MULTIPLE BENIGN NEVI: ICD-10-CM

## 2024-01-15 PROCEDURE — 99203 OFFICE O/P NEW LOW 30 MIN: CPT | Performed by: DERMATOLOGY

## 2024-01-15 NOTE — PROGRESS NOTES
Clau Wilder is an extremely pleasant 38 year old year old female patient here today for moles on skin.  Patient has no other skin complaints today.  Remainder of the HPI, Meds, PMH, Allergies, FH, and SH was reviewed in chart.      Past Medical History:   Diagnosis Date    Calculi, ureter     Depressive disorder     IUD migration     perforation    Mental disorder        Past Surgical History:   Procedure Laterality Date    ARTHROSCOPY KNEE Right 2022    Procedure: Athroscopic synovial biopsy Right knee;  Surgeon: Kelton Sanders MD;  [facility]     SECTION      [id number] with stone removal      HYSTERECTOMY VAGINAL, BILATERAL SALPINGO-OOPHERECTOMY, COMBINED      HYSTEROSCOPY DIAGNOSTIC  2016    LAPAROSCOPIC OOPHORECTOMY Right 2023    Procedure: Laparoscopy  Right oophorectomy;  Surgeon: Godfrey Dasilva MD;  Location: [facility]    LAPAROSCOPY OPERATIVE ADULT  2016    removal of ectopic IUD    ORTHOPEDIC SURGERY      tendon repair ankle    MA HAND/FINGER SURGERY UNLISTED  10/06/2021    MA STOMACH SURGERY PROCEDURE UNLISTED      , IUD retrieval    SYNOVECTOMY KNEE Right 3/24/2022    Procedure: Open anterior synovectomy Right knee;  Surgeon: Kelton Sanders MD;  [facility]    SYNOVECTOMY KNEE POSTERIOR Right 3/24/2022    Procedure: Open posterior synovectomy Right knee;  Surgeon: Kelton Sanders MD;  [facility]        Family History   Problem Relation Age of Onset    Thyroid Cancer Mother     Other Cancer Mother         Thyroid cancer    Lupus Maternal Grandmother     Aneurysm Paternal Grandfather     Anesthesia Reaction No family hx of     Deep Vein Thrombosis (DVT) No family hx of        Social History     Socioeconomic History    Marital status:      Spouse name: Not on file    Number of children: 1    Years of education: Not on file    Highest education level: Not on file   Occupational History    Not on file   Tobacco Use     Smoking status: Never    Smokeless tobacco: Never   Vaping Use    Vaping Use: Never used   Substance and Sexual Activity    Alcohol use: Yes     Comment: occ.    Drug use: No    Sexual activity: Yes     Partners: Male     Birth control/protection: Female Surgical   Other Topics Concern    Parent/sibling w/ CABG, MI or angioplasty before 65F 55M? 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[employer]     Social Determinants of Health     Financial Resource Strain: Not on file   Food Insecurity: Not on file   Transportation Needs: Not on file   Physical Activity: Not on file   Stress: Not on file   Social Connections: Not on file   Interpersonal Safety: Not on file   Housing Stability: Not on file       Outpatient Encounter Medications as of 1/15/2024   Medication Sig Dispense Refill    acetaminophen (TYLENOL) 325 MG tablet Take 2 tablets (650 mg) by mouth every 4 hours 100 tablet 0    Fiber Adult Gummies 2 g CHEW Take 1 chew tab by mouth daily (Patient not taking: Reported on 9/5/2023)      FLUoxetine (PROZAC) 10 MG capsule Take 20 mg by mouth every morning       ibuprofen (ADVIL/MOTRIN) 200 MG tablet Take 200 mg by mouth every 4 hours as needed for mild pain      lamoTRIgine (LAMICTAL) 150 MG tablet Take 300 mg by mouth At Bedtime       multivitamin w/minerals (THERA-VIT-M) tablet Take 1 tablet by mouth every morning      polyethylene glycol (MIRALAX) 17 g packet Take 17 g by mouth daily (Patient not taking: Reported on 9/5/2023) 7 packet 0    Probiotic Product (PROBIOTIC DAILY PO) Take 1 capsule by mouth daily (Patient not taking: Reported on 9/5/2023)       No facility-administered encounter medications on file as of 1/15/2024.             O:   NAD, WDWN, Alert & Oriented, Mood & Affect wnl, Vitals stable   Here today alone   LMP 08/23/2016    General appearance normal   Vitals stable   Alert, oriented and in no acute distress     pigmented macules on trunk and ext with regular  borders and pigment networks      brown macules on trunk and ext   Red papules on trunk  Flesh colored papules on trunk     The remainder of the full exam was normal; the following areas were examined:  conjunctiva/lids, , neck, peripheral vascular system, abdomen, lymph nodes, digits/nails, eccrine and apocrine glands, scalp/hair, face, neck, chest, abdomen, buttocks, back, RUE, LUE, RLE, LLE       Eyes: Conjunctivae/lids:Normal     ENT: Lips, buccal mucosa, tongue: normal    MSK:Normal    Cardiovascular: peripheral edema none    Pulm: Breathing Normal    Lymph Nodes: No Head and Neck Lymphadenopathy     Neuro/Psych: Orientation:Alert and Orientedx3 ; Mood/Affect:normal       A/P:  1. Nevi, , lentigo, angioma, dermal nevus  It was a pleasure speaking to Clau Wilder today.  Previous clinic notes and pertinent laboratory tests were reviewed prior to Clau Wilder's visit.  Signs and Symptoms of skin cancer discussed with patient.  Patient encouraged to perform monthly skin exams.  UV precautions reviewed with patient.  Return to clinic 12 months

## 2024-01-15 NOTE — LETTER
1/15/2024         RE: Clau Wilder  [address]  [place] [address]        Dear Colleague,    Thank you for referring your patient, Clau Wilder, to the [facility]. Please see a copy of my visit note below.    Clau Wilder is an extremely pleasant 38 year old year old female patient here today for moles on skin.  Patient has no other skin complaints today.  Remainder of the HPI, Meds, PMH, Allergies, FH, and SH was reviewed in chart.      Past Medical History:   Diagnosis Date     Calculi, ureter      Depressive disorder      IUD migration     perforation     Mental disorder        Past Surgical History:   Procedure Laterality Date     ARTHROSCOPY KNEE Right 2022    Procedure: Athroscopic synovial biopsy Right knee;  Surgeon: Kelton Sanders MD;  [facility]      SECTION       [id number] with stone removal       HYSTERECTOMY VAGINAL, BILATERAL SALPINGO-OOPHERECTOMY, COMBINED       HYSTEROSCOPY DIAGNOSTIC  2016     LAPAROSCOPIC OOPHORECTOMY Right 2023    Procedure: Laparoscopy  Right oophorectomy;  Surgeon: Godfrey Dasilva MD;  Location: [facility]     LAPAROSCOPY OPERATIVE ADULT  2016    removal of ectopic IUD     ORTHOPEDIC SURGERY      tendon repair ankle     RI HAND/FINGER SURGERY UNLISTED  10/06/2021     RI STOMACH SURGERY PROCEDURE UNLISTED      , IUD retrieval     SYNOVECTOMY KNEE Right 3/24/2022    Procedure: Open anterior synovectomy Right knee;  Surgeon: Kelton Sanders MD;  [facility]     SYNOVECTOMY KNEE POSTERIOR Right 3/24/2022    Procedure: Open posterior synovectomy Right knee;  Surgeon: Kelton Sanders MD;  [facility]        Family History   Problem Relation Age of Onset     Thyroid Cancer Mother      Other Cancer Mother         Thyroid cancer     Lupus Maternal Grandmother      Aneurysm Paternal Grandfather      Anesthesia Reaction No family hx of      Deep Vein Thrombosis (DVT) No family hx  of        Social History     Socioeconomic History     Marital status:      Spouse name: Not on file     Number of children: 1     Years of education: Not on file     Highest education level: Not on file   Occupational History     Not on file   Tobacco Use     Smoking status: Never     Smokeless tobacco: Never   Vaping Use     Vaping Use: Never used   Substance and Sexual Activity     Alcohol use: Yes     Comment: occ.     Drug use: No     Sexual activity: Yes     Partners: Male     Birth control/protection: Female Surgical   Other Topics Concern     Parent/sibling w/ CABG, MI or angioplasty before 65F 55M? 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[employer]     Social Determinants of Health     Financial Resource Strain: Not on file   Food Insecurity: Not on file   Transportation Needs: Not on file   Physical Activity: Not on file   Stress: Not on file   Social Connections: Not on file   Interpersonal Safety: Not on file   Housing Stability: Not on file       Outpatient Encounter Medications as of 1/15/2024   Medication Sig Dispense Refill     acetaminophen (TYLENOL) 325 MG tablet Take 2 tablets (650 mg) by mouth every 4 hours 100 tablet 0     Fiber Adult Gummies 2 g CHEW Take 1 chew tab by mouth daily (Patient not taking: Reported on 9/5/2023)       FLUoxetine (PROZAC) 10 MG capsule Take 20 mg by mouth every morning        ibuprofen (ADVIL/MOTRIN) 200 MG tablet Take 200 mg by mouth every 4 hours as needed for mild pain       lamoTRIgine (LAMICTAL) 150 MG tablet Take 300 mg by mouth At Bedtime        multivitamin w/minerals (THERA-VIT-M) tablet Take 1 tablet by mouth every morning       polyethylene glycol (MIRALAX) 17 g packet Take 17 g by mouth daily (Patient not taking: Reported on 9/5/2023) 7 packet 0     Probiotic Product (PROBIOTIC DAILY PO) Take 1 capsule by mouth daily (Patient not taking: Reported on 9/5/2023)       No facility-administered encounter medications on  file as of 1/15/2024.             O:   NAD, WDWN, Alert & Oriented, Mood & Affect wnl, Vitals stable   Here today alone   LMP 08/23/2016    General appearance normal   Vitals stable   Alert, oriented and in no acute distress     pigmented macules on trunk and ext with regular borders and pigment networks      brown macules on trunk and ext   Red papules on trunk  Flesh colored papules on trunk     The remainder of the full exam was normal; the following areas were examined:  conjunctiva/lids, , neck, peripheral vascular system, abdomen, lymph nodes, digits/nails, eccrine and apocrine glands, scalp/hair, face, neck, chest, abdomen, buttocks, back, RUE, LUE, RLE, LLE       Eyes: Conjunctivae/lids:Normal     ENT: Lips, buccal mucosa, tongue: normal    MSK:Normal    Cardiovascular: peripheral edema none    Pulm: Breathing Normal    Lymph Nodes: No Head and Neck Lymphadenopathy     Neuro/Psych: Orientation:Alert and Orientedx3 ; Mood/Affect:normal       A/P:  1. Nevi, , lentigo, angioma, dermal nevus  It was a pleasure speaking to Clau Wilder today.  Previous clinic notes and pertinent laboratory tests were reviewed prior to Clau Wilder's visit.  Signs and Symptoms of skin cancer discussed with patient.  Patient encouraged to perform monthly skin exams.  UV precautions reviewed with patient.  Return to clinic 12 months      Again, thank you for allowing me to participate in the care of your patient.        Sincerely,        Antonio Harvey MD

## 2024-04-12 NOTE — PROGRESS NOTES
[facility]  Orthopaedic Surgery, Joint Replacement Consultation  by Kelton Sanders M.D.    Clau Wilder MRN# [id number]   Age: 37 year old YOB: 1985     Requesting physician: Alyssa Verdugo       Background:   DX:  Right knee atraumatic effusion and pain, May 2021  TGCT R knee, diffuse type     TREATMENTS:  12/9/21 MRI of right knee demonstrating Synovitis with multiple masslike areas of  synovial proliferation with associated susceptibility artifact.  Findings highly suspicious for PVNS.  2/18/2022, 'scope biopsy R knee (Tommy) [facility]  4.  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  She reports that she has minimal symptomatology.  She notices problems when she kneels or she squats.  Otherwise she is asymptomatic and able to perform all regular activities.  She ambulates without difficulty or any assistance and independently.  The symptoms that she is having are no different than a year ago.    I reviewed her MRI and there is evidence of persistence of her tenosynovial giant cell tumor.  It has not changed markedly although does involve substantial amount of her suprapatellar region and the posterior aspect of her knee joint.    Examination reveals that she has full active extension of the right knee joint with further flexion to 120 to 5 degrees as opposed to 135 degrees on the contralateral side.  Small effusion appreciated.  Collateral ligaments stable.    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  Minimally symptomatic.    Plan:  I again discussed the medical and surgical options with the patient.  She is in favor of withholding and refrain from any treatment for the present time.  I did advise her that at some point I think she should  undergo treatment given the large degree of involvement and the potential for future damage to her knee joint.  I will also inquire about the status of the research drug trial that Dr. Alva is engaged in initiating at .    The KOOS, Promis and BARBARA lower extremity PROMs should be completed at her next visit    MD Richard Taylor Family Professor  Oncology and Adult Reconstructive Surgery  Dept Orthopaedic Surgery, [facility]  [phone] office, [phone] pager  [email or web address]      Total combined visit time and work time before and after clinic visit on encounter date = 20 min    4/2024:        11/2022 4/2023

## 2024-04-18 ENCOUNTER — OFFICE VISIT (OUTPATIENT)
Dept: ORTHOPEDICS | Facility: CLINIC | Age: 39
End: 2024-04-18
Payer: COMMERCIAL

## 2024-04-18 ENCOUNTER — ANCILLARY PROCEDURE (OUTPATIENT)
Dept: MRI IMAGING | Facility: CLINIC | Age: 39
End: 2024-04-18
Attending: ORTHOPAEDIC SURGERY
Payer: COMMERCIAL

## 2024-04-18 DIAGNOSIS — M12.20 PVNS (PIGMENTED VILLONODULAR SYNOVITIS): ICD-10-CM

## 2024-04-18 DIAGNOSIS — D48.19 TENOSYNOVIAL GIANT CELL TUMOR OF KNEE: Primary | ICD-10-CM

## 2024-04-18 DIAGNOSIS — D48.19 TENOSYNOVIAL GIANT CELL TUMOR OF KNEE: ICD-10-CM

## 2024-04-18 PROCEDURE — 99213 OFFICE O/P EST LOW 20 MIN: CPT | Performed by: ORTHOPAEDIC SURGERY

## 2024-04-18 PROCEDURE — 73723 MRI JOINT LWR EXTR W/O&W/DYE: CPT | Mod: RT | Performed by: RADIOLOGY

## 2024-04-18 PROCEDURE — A9585 GADOBUTROL INJECTION: HCPCS | Performed by: RADIOLOGY

## 2024-04-18 RX ORDER — GADOBUTROL 604.72 MG/ML
10 INJECTION INTRAVENOUS ONCE
Status: COMPLETED | OUTPATIENT
Start: 2024-04-18 | End: 2024-04-18

## 2024-04-18 RX ADMIN — GADOBUTROL 9 ML: 604.72 INJECTION INTRAVENOUS at 11:20

## 2024-04-18 ASSESSMENT — ACTIVITIES OF DAILY LIVING (ADL): FUNCTION,_DAILY_LIVING_SCORE: 97.06

## 2024-04-18 NOTE — LETTER
4/18/2024         RE: Clau Wilder  [address]  [place] [address]        Dear Colleague,    Thank you for referring your patient, Clau Wilder, to the [facility] [place]. Please see a copy of my visit note below.        [facility]  Orthopaedic Surgery, Joint Replacement Consultation  by Kelton Sanders M.D.    Clau Wilder MRN# [id number]   Age: 37 year old YOB: 1985     Requesting physician: Alyssa Verdugo       Background:   DX:  Right knee atraumatic effusion and pain, May 2021  TGCT R knee, diffuse type     TREATMENTS:  12/9/21 MRI of right knee demonstrating Synovitis with multiple masslike areas of  synovial proliferation with associated susceptibility artifact.  Findings highly suspicious for PVNS.  2/18/2022, 'scope biopsy R knee (Tommy) [facility]  4.  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  She reports that she has minimal symptomatology.  She notices problems when she kneels or she squats.  Otherwise she is asymptomatic and able to perform all regular activities.  She ambulates without difficulty or any assistance and independently.  The symptoms that she is having are no different than a year ago.    I reviewed her MRI and there is evidence of persistence of her tenosynovial giant cell tumor.  It has not changed markedly although does involve substantial amount of her suprapatellar region and the posterior aspect of her knee joint.    Examination reveals that she has full active extension of the right knee joint with further flexion to 120 to 5 degrees as opposed to 135 degrees on the contralateral side.  Small effusion appreciated.  Collateral ligaments stable.    Impression:  Relapsed tenosynovial giant cell tumor of right  knee joint.  Minimally symptomatic.    Plan:  I again discussed the medical and surgical options with the patient.  She is in favor of withholding and refrain from any treatment for the present time.  I did advise her that at some point I think she should undergo treatment given the large degree of involvement and the potential for future damage to her knee joint.  I will also inquire about the status of the research drug trial that Dr. Alva is engaged in initiating at .    The KOOS, Promis and BARBARA lower extremity PROMs should be completed at her next visit    MD Richard Taylor Family Professor  Oncology and Adult Reconstructive Surgery  Dept Orthopaedic Surgery, [facility]  [phone] office, [phone] pager  [email or web address]      Total combined visit time and work time before and after clinic visit on encounter date = 20 min    4/2024:        11/2022 4/2023

## 2024-09-01 ENCOUNTER — HEALTH MAINTENANCE LETTER (OUTPATIENT)
Age: 39
End: 2024-09-01

## 2025-04-25 ENCOUNTER — TELEPHONE (OUTPATIENT)
Dept: ORTHOPEDICS | Facility: CLINIC | Age: 40
End: 2025-04-25
Payer: COMMERCIAL

## 2025-04-25 NOTE — TELEPHONE ENCOUNTER
Left Voicemail (1st Attempt) and Sent Mychart (1st Attempt) for the patient to call back and schedule the following:    Appointment type: Return Knee  Provider:   Return date: Next Available after MRI is comlpeted  Specialty phone number: [phone]  Additional appointment(s) needed: MRI

## 2025-04-28 NOTE — TELEPHONE ENCOUNTER
Left Voicemail (2nd Attempt) and Sent Mychart (2nd Attempt) for the patient to call back and schedule the following:    Appointment type: Return Knee  Provider:   Return date: Next Available after MRI is comlpeted  Specialty phone number: [phone]  Additional appointment(s) needed: MRI  MAX attempts made to schedule

## 2025-06-10 ENCOUNTER — ANCILLARY PROCEDURE (OUTPATIENT)
Dept: MRI IMAGING | Facility: CLINIC | Age: 40
End: 2025-06-10
Attending: ORTHOPAEDIC SURGERY
Payer: COMMERCIAL

## 2025-06-10 DIAGNOSIS — D48.19 TENOSYNOVIAL GIANT CELL TUMOR OF KNEE: ICD-10-CM

## 2025-06-10 DIAGNOSIS — M12.20 PVNS (PIGMENTED VILLONODULAR SYNOVITIS): ICD-10-CM

## 2025-06-10 PROCEDURE — A9585 GADOBUTROL INJECTION: HCPCS | Performed by: RADIOLOGY

## 2025-06-10 PROCEDURE — 73723 MRI JOINT LWR EXTR W/O&W/DYE: CPT | Mod: RT | Performed by: RADIOLOGY

## 2025-06-10 RX ORDER — GADOBUTROL 604.72 MG/ML
10 INJECTION INTRAVENOUS ONCE
Status: COMPLETED | OUTPATIENT
Start: 2025-06-10 | End: 2025-06-10

## 2025-06-10 RX ADMIN — GADOBUTROL 8 ML: 604.72 INJECTION INTRAVENOUS at 08:52

## 2025-06-10 NOTE — DISCHARGE INSTRUCTIONS
MRI Contrast Discharge Instructions    The IV contrast you received today will pass out of your body in your  urine. This will happen in the next 24 hours. You will not feel this process.  Your urine will not change color.    Drink at least 4 extra glasses of water or juice today (unless your doctor  has restricted your fluids). This reduces the stress on your kidneys.  You may take your regular medicines.    If you are on dialysis: It is best to have dialysis today.    If you have a reaction: Most reactions happen right away. If you have  any new symptoms after leaving the hospital (such as hives or swelling),  call your hospital at the correct number below. Or call your family doctor.  If you have breathing distress or wheezing, call 911.    Special instructions: ***    I have read and understand the above information.    Signature:______________________________________ Date:___________    Staff:__________________________________________ Date:___________     Time:__________    [facility]:    ___[place]: [phone]  ___[facility]: [phone]  ___[place]: [phone] ___[facility]: [phone]  ___[facility]: [phone]  ___[facility]: [phone]  ___[phone]  ___[facility]: [phone]  ___[phone]

## 2025-08-04 ENCOUNTER — ANCILLARY PROCEDURE (OUTPATIENT)
Dept: GENERAL RADIOLOGY | Facility: CLINIC | Age: 40
End: 2025-08-04
Attending: ORTHOPAEDIC SURGERY
Payer: COMMERCIAL

## 2025-08-04 ENCOUNTER — OFFICE VISIT (OUTPATIENT)
Dept: ORTHOPEDICS | Facility: CLINIC | Age: 40
End: 2025-08-04
Payer: COMMERCIAL

## 2025-08-04 DIAGNOSIS — M12.20 PVNS (PIGMENTED VILLONODULAR SYNOVITIS): ICD-10-CM

## 2025-08-04 DIAGNOSIS — D48.19 TENOSYNOVIAL GIANT CELL TUMOR OF KNEE: Primary | ICD-10-CM

## 2025-08-04 DIAGNOSIS — D48.19 TENOSYNOVIAL GIANT CELL TUMOR OF KNEE: ICD-10-CM

## 2025-08-04 PROCEDURE — 73560 X-RAY EXAM OF KNEE 1 OR 2: CPT | Mod: RT | Performed by: RADIOLOGY

## 2025-08-04 PROCEDURE — 99213 OFFICE O/P EST LOW 20 MIN: CPT | Performed by: ORTHOPAEDIC SURGERY

## 2025-08-04 ASSESSMENT — KOOS JR
GOING UP OR DOWN STAIRS: MODERATE
HOW SEVERE IS YOUR KNEE STIFFNESS AFTER FIRST WAKING IN MORNING: MILD
STRAIGHTENING KNEE FULLY: MILD
STANDING UPRIGHT: MILD
TWISING OR PIVOTING ON KNEE: MILD
KOOS JR SCORING: 70.7

## 2025-08-05 ENCOUNTER — PATIENT OUTREACH (OUTPATIENT)
Dept: ONCOLOGY | Facility: CLINIC | Age: 40
End: 2025-08-05
Payer: COMMERCIAL

## (undated) DEVICE — SUCTION MANIFOLD NEPTUNE 2 SYS 1 PORT 702-025-000

## (undated) DEVICE — TUBING ARTHROSCOPY PUMP ARTHREX AR-6410

## (undated) DEVICE — LINEN GOWN OVERSIZE 5408

## (undated) DEVICE — ESU HOLSTER PLASTIC DISP E2400

## (undated) DEVICE — SYR 05ML LL W/O NDL

## (undated) DEVICE — ENDO TROCAR OPTICAL ACCESS KII Z-THRD 08X100MM C0Q19

## (undated) DEVICE — ANTIFOG SOLUTION W/FOAM PAD 31142527

## (undated) DEVICE — NDL BUTTERFLY 25GA .75" 367298

## (undated) DEVICE — GLOVE PROTEXIS BLUE W/NEU-THERA 7.5  2D73EB75

## (undated) DEVICE — BLADE KNIFE SURG 15 371115

## (undated) DEVICE — BLADE CLIPPER 4406

## (undated) DEVICE — COVER CAMERA IN-LIGHT DISP LT-C02

## (undated) DEVICE — ESU GROUND PAD UNIVERSAL W/O CORD

## (undated) DEVICE — SU VICRYL 0 CT-1 27" J340H

## (undated) DEVICE — LIGHT HANDLE X2

## (undated) DEVICE — GLOVE BIOGEL PI ULTRATOUCH G SZ 8.0 42180

## (undated) DEVICE — PREP POVIDONE-IODINE 7.5% SCRUB 4OZ BOTTLE MDS093945

## (undated) DEVICE — ESU LIGASURE MARYLAND LAPAROSCOPIC SLR/DVDR 5MMX37CM LF1937

## (undated) DEVICE — GLOVE PROTEXIS BLUE W/NEU-THERA 8.0  2D73EB80

## (undated) DEVICE — DRSG STERI STRIP 1/2X4" R1547

## (undated) DEVICE — LINEN TOWEL PACK X5 5464

## (undated) DEVICE — GOWN IMPERVIOUS SPECIALTY XLG/XLONG 32474

## (undated) DEVICE — DRSG TEGADERM 4X10" 1627

## (undated) DEVICE — TUBING C02 INSUFFLATION LAP FILTER HEATER 6198

## (undated) DEVICE — SU SILK 2-0 TIE 12X30" A305H

## (undated) DEVICE — STRAP KNEE/BODY 31143004

## (undated) DEVICE — SU SILK 3-0 TIE 12X30" A304H

## (undated) DEVICE — DRAIN JACKSON PRATT RESERVOIR 100ML SU130-1305

## (undated) DEVICE — SUCTION IRR STRYKERFLOW II W/TIP 250-070-520

## (undated) DEVICE — SOL WATER IRRIG 1000ML BOTTLE 2F7114

## (undated) DEVICE — CAST PADDING 6" STERILE 9046S

## (undated) DEVICE — DRAPE STOCKINETTE 8" 8586

## (undated) DEVICE — SU SILK 3-0 SH 30" K832H

## (undated) DEVICE — STOCKING SLEEVE COMPRESSION CALF LG

## (undated) DEVICE — GOWN XLG DISP 9545

## (undated) DEVICE — SU DERMABOND ADVANCED .7ML DNX12

## (undated) DEVICE — SU CHROMIC 3-0 RB-1 27" U204H

## (undated) DEVICE — Device

## (undated) DEVICE — NDL 18GA 1.5" 305196

## (undated) DEVICE — BNDG ESMARK 6" STERILE LF 820-3612

## (undated) DEVICE — TUBING SUCTION MEDI-VAC SOFT 3/16"X20' N520A

## (undated) DEVICE — DEVICE SUTURE PASSER 14GA WECK EFX EFXSP2

## (undated) DEVICE — GLOVE PROTEXIS W/NEU-THERA 7.0  2D73TE70

## (undated) DEVICE — DRSG TELFA 3X8" 1238

## (undated) DEVICE — GLOVE PROTEXIS W/NEU-THERA 7.5  2D73TE75

## (undated) DEVICE — PACK UNIVERSAL SPLIT 29131

## (undated) DEVICE — DRAPE STOCKINETTE IMPERVIOUS 12" 1587

## (undated) DEVICE — DRAIN JACKSON PRATT 15FR ROUND SU130-1323

## (undated) DEVICE — DRAIN JACKSON PRATT ROUND W/TROCAR 15FR LF JP-HUR151

## (undated) DEVICE — SU VICRYL 3-0 SH 27" J316H

## (undated) DEVICE — SUCTION MANIFOLD NEPTUNE 2 SYS 4 PORT 0702-020-000

## (undated) DEVICE — LINEN BACK PACK 5440

## (undated) DEVICE — SOL NACL 0.9% IRRIG 1000ML BOTTLE 07138-09

## (undated) DEVICE — PREP DURAPREP REMOVER 4OZ 8611

## (undated) DEVICE — ENDO POUCH UNIV RETRIEVAL SYSTEM INZII 10MM CD001

## (undated) DEVICE — SPECIMEN TRAP STRYKER NEPTUNE IN-LINE 0700-050-000

## (undated) DEVICE — ESU HOLDER LAP INST DISP YELLOW SHORT 250MM H-PRO-250

## (undated) DEVICE — SPONGE KITTNER 30-101

## (undated) DEVICE — DRAIN PENROSE 1/4"X18" LATEX  30416-025

## (undated) DEVICE — PREP SKIN SCRUB TRAY 4461A

## (undated) DEVICE — BASIN SET MAJOR

## (undated) DEVICE — LINEN LEG DRAPE 5457

## (undated) DEVICE — ESU PENCIL W/HOLSTER E2350H

## (undated) DEVICE — LIGHT HANDLE X1 31140133

## (undated) DEVICE — PREP DURAPREP 26ML APL 8630

## (undated) DEVICE — SYR 10ML LL W/O NDL

## (undated) DEVICE — PACK LAPAROSCOPY/PELVISCOPY STD

## (undated) DEVICE — SYR 20ML LL W/O NDL

## (undated) DEVICE — SU PDS II 3-0 PS-2 18" Z497G

## (undated) DEVICE — SU VICRYL 4-0 FS-2 27" J422-H

## (undated) DEVICE — BLADE KNIFE SURG 10 371110

## (undated) DEVICE — CATH TRAY FOLEY 16FR SIL

## (undated) DEVICE — CLIP HORIZON MED BLUE 002200

## (undated) DEVICE — ENDO TROCAR FIRST ENTRY KII FIOS ADV FIX 12X100MM CFF73

## (undated) DEVICE — SUCTION TIP YANKAUER STR K87

## (undated) DEVICE — SU ETHIBOND 1 CT-1 30" X425H

## (undated) DEVICE — VESSEL LOOPS BLUE MAXI

## (undated) DEVICE — DRSG ADAPTIC 3X3" 6112

## (undated) DEVICE — SU PDS II 0 CT-1 36" Z346H

## (undated) DEVICE — ARTHROSCOPIC CANNULA 5.5X70MM GRAY  9718

## (undated) DEVICE — DRSG TEGADERM ALGINATE AG 4X5" 90303

## (undated) DEVICE — SOL NACL 0.9% IRRIG 1000ML BOTTLE 2F7124

## (undated) DEVICE — NDL INSUFFLATION 13GA 150MM C2202

## (undated) DEVICE — PREP CHLORHEXIDINE 4% 4OZ (HIBICLENS) 57504

## (undated) DEVICE — SU PDS II 2-0 SH 27" Z317H

## (undated) DEVICE — SPECIMEN CONTAINER W/10% BUFFERED FORMALIN 120ML 591201

## (undated) DEVICE — LUBRICATING JELLY 4.25OZ

## (undated) DEVICE — ADH SKIN CLOSURE PREMIERPRO EXOFIN 1.0ML 3470

## (undated) DEVICE — PAD PERI INDIV WRAP 11" 2022

## (undated) DEVICE — CLIP HORIZON SM RED WIDE SLOT 001201

## (undated) DEVICE — SU PDS II 2-0 CT-2 27"  Z333H

## (undated) DEVICE — SU ETHIBOND 0 SH 36" X524H

## (undated) DEVICE — DRAPE POUCH INSTRUMENT 3 POCKET 1018L

## (undated) DEVICE — SU VICRYL 2-0 CT-1 27" UND J259H

## (undated) DEVICE — SU PDS II 3-0 PS-1 18" Z683G

## (undated) DEVICE — ENDO TROCAR FIRST ENTRY KII FIOS ADV FIX 05X100MM CFF03

## (undated) DEVICE — DECANTER VIAL 2006S

## (undated) DEVICE — SU SILK 2-0 SH CR 5X18" C0125

## (undated) DEVICE — SYR 50ML LL W/O NDL 309653

## (undated) DEVICE — GLOVE BIOGEL PI MICRO SZ 7.5 48575

## (undated) DEVICE — SYSTEM LAPAROVUE VISIBILITY LAPVUE10

## (undated) DEVICE — DRAPE CONVERTORS U-DRAPE 60X72" 8476

## (undated) DEVICE — SOL NACL 0.9% IRRIG 3000ML BAG 2B7477

## (undated) DEVICE — PREP CHLORAPREP 26ML TINTED HI-LITE ORANGE 930815

## (undated) RX ORDER — ACETAMINOPHEN 325 MG/1
TABLET ORAL
Status: DISPENSED
Start: 2022-02-18

## (undated) RX ORDER — KETOROLAC TROMETHAMINE 30 MG/ML
INJECTION, SOLUTION INTRAMUSCULAR; INTRAVENOUS
Status: DISPENSED
Start: 2022-02-18

## (undated) RX ORDER — CEFAZOLIN SODIUM 1 G/3ML
INJECTION, POWDER, FOR SOLUTION INTRAMUSCULAR; INTRAVENOUS
Status: DISPENSED
Start: 2022-03-24

## (undated) RX ORDER — FENTANYL CITRATE 50 UG/ML
INJECTION, SOLUTION INTRAMUSCULAR; INTRAVENOUS
Status: DISPENSED
Start: 2022-02-18

## (undated) RX ORDER — OXYCODONE HYDROCHLORIDE 5 MG/1
TABLET ORAL
Status: DISPENSED
Start: 2022-02-18

## (undated) RX ORDER — DEXAMETHASONE SODIUM PHOSPHATE 4 MG/ML
INJECTION, SOLUTION INTRA-ARTICULAR; INTRALESIONAL; INTRAMUSCULAR; INTRAVENOUS; SOFT TISSUE
Status: DISPENSED
Start: 2023-07-31

## (undated) RX ORDER — LIDOCAINE HYDROCHLORIDE 20 MG/ML
INJECTION, SOLUTION EPIDURAL; INFILTRATION; INTRACAUDAL; PERINEURAL
Status: DISPENSED
Start: 2022-02-18

## (undated) RX ORDER — DEXAMETHASONE SODIUM PHOSPHATE 4 MG/ML
INJECTION, SOLUTION INTRA-ARTICULAR; INTRALESIONAL; INTRAMUSCULAR; INTRAVENOUS; SOFT TISSUE
Status: DISPENSED
Start: 2022-02-18

## (undated) RX ORDER — HYDROMORPHONE HYDROCHLORIDE 1 MG/ML
INJECTION, SOLUTION INTRAMUSCULAR; INTRAVENOUS; SUBCUTANEOUS
Status: DISPENSED
Start: 2022-03-24

## (undated) RX ORDER — ACETAMINOPHEN 325 MG/1
TABLET ORAL
Status: DISPENSED
Start: 2023-07-31

## (undated) RX ORDER — PROPOFOL 10 MG/ML
INJECTION, EMULSION INTRAVENOUS
Status: DISPENSED
Start: 2022-02-18

## (undated) RX ORDER — ONDANSETRON 2 MG/ML
INJECTION INTRAMUSCULAR; INTRAVENOUS
Status: DISPENSED
Start: 2023-07-31

## (undated) RX ORDER — LIDOCAINE HYDROCHLORIDE 10 MG/ML
INJECTION, SOLUTION EPIDURAL; INFILTRATION; INTRACAUDAL; PERINEURAL
Status: DISPENSED
Start: 2023-07-31

## (undated) RX ORDER — PHENAZOPYRIDINE HYDROCHLORIDE 200 MG/1
TABLET, FILM COATED ORAL
Status: DISPENSED
Start: 2023-07-31

## (undated) RX ORDER — PROPOFOL 10 MG/ML
INJECTION, EMULSION INTRAVENOUS
Status: DISPENSED
Start: 2023-07-31

## (undated) RX ORDER — MAGNESIUM SULFATE HEPTAHYDRATE 40 MG/ML
INJECTION, SOLUTION INTRAVENOUS
Status: DISPENSED
Start: 2023-07-31

## (undated) RX ORDER — DEXAMETHASONE SODIUM PHOSPHATE 4 MG/ML
INJECTION, SOLUTION INTRA-ARTICULAR; INTRALESIONAL; INTRAMUSCULAR; INTRAVENOUS; SOFT TISSUE
Status: DISPENSED
Start: 2022-03-24

## (undated) RX ORDER — FENTANYL CITRATE 50 UG/ML
INJECTION, SOLUTION INTRAMUSCULAR; INTRAVENOUS
Status: DISPENSED
Start: 2022-03-24

## (undated) RX ORDER — ACETAMINOPHEN 325 MG/1
TABLET ORAL
Status: DISPENSED
Start: 2022-03-24

## (undated) RX ORDER — BUPIVACAINE HYDROCHLORIDE AND EPINEPHRINE 5; 5 MG/ML; UG/ML
INJECTION, SOLUTION EPIDURAL; INTRACAUDAL; PERINEURAL
Status: DISPENSED
Start: 2023-07-31

## (undated) RX ORDER — FENTANYL CITRATE 50 UG/ML
INJECTION, SOLUTION INTRAMUSCULAR; INTRAVENOUS
Status: DISPENSED
Start: 2023-07-31

## (undated) RX ORDER — CEFAZOLIN SODIUM/WATER 2 G/20 ML
SYRINGE (ML) INTRAVENOUS
Status: DISPENSED
Start: 2022-02-18

## (undated) RX ORDER — ONDANSETRON 2 MG/ML
INJECTION INTRAMUSCULAR; INTRAVENOUS
Status: DISPENSED
Start: 2022-02-18

## (undated) RX ORDER — BUPIVACAINE HYDROCHLORIDE AND EPINEPHRINE 2.5; 5 MG/ML; UG/ML
INJECTION, SOLUTION EPIDURAL; INFILTRATION; INTRACAUDAL; PERINEURAL
Status: DISPENSED
Start: 2022-02-18

## (undated) RX ORDER — GLYCOPYRROLATE 0.2 MG/ML
INJECTION, SOLUTION INTRAMUSCULAR; INTRAVENOUS
Status: DISPENSED
Start: 2023-07-31

## (undated) RX ORDER — PROPOFOL 10 MG/ML
INJECTION, EMULSION INTRAVENOUS
Status: DISPENSED
Start: 2022-03-24

## (undated) RX ORDER — CEFAZOLIN SODIUM/WATER 2 G/20 ML
SYRINGE (ML) INTRAVENOUS
Status: DISPENSED
Start: 2022-03-24

## (undated) RX ORDER — BUPIVACAINE HYDROCHLORIDE 2.5 MG/ML
INJECTION, SOLUTION EPIDURAL; INFILTRATION; INTRACAUDAL
Status: DISPENSED
Start: 2023-07-31

## (undated) RX ORDER — CEFAZOLIN SODIUM/WATER 2 G/20 ML
SYRINGE (ML) INTRAVENOUS
Status: DISPENSED
Start: 2023-07-31

## (undated) RX ORDER — FENTANYL CITRATE-0.9 % NACL/PF 10 MCG/ML
PLASTIC BAG, INJECTION (ML) INTRAVENOUS
Status: DISPENSED
Start: 2022-02-18

## (undated) RX ORDER — ONDANSETRON 2 MG/ML
INJECTION INTRAMUSCULAR; INTRAVENOUS
Status: DISPENSED
Start: 2022-03-24

## (undated) RX ORDER — KETOROLAC TROMETHAMINE 30 MG/ML
INJECTION, SOLUTION INTRAMUSCULAR; INTRAVENOUS
Status: DISPENSED
Start: 2023-07-31

## (undated) RX ORDER — GABAPENTIN 300 MG/1
CAPSULE ORAL
Status: DISPENSED
Start: 2023-07-31